# Patient Record
Sex: FEMALE | Race: WHITE | NOT HISPANIC OR LATINO | Employment: OTHER | URBAN - METROPOLITAN AREA
[De-identification: names, ages, dates, MRNs, and addresses within clinical notes are randomized per-mention and may not be internally consistent; named-entity substitution may affect disease eponyms.]

---

## 2018-05-04 ENCOUNTER — TELEPHONE (OUTPATIENT)
Dept: CARDIAC SURGERY | Facility: CLINIC | Age: 83
End: 2018-05-04

## 2018-05-04 NOTE — TELEPHONE ENCOUNTER
PT WAS MADE AN APPT ON 6/2618 IN [place]. I CALLED PT TO GIVE HER APPT DATE AND TIME. PT STATES THAT SHE CAN NOT WAIT THAT LONG. PT STATES SHE IS HAVING VERY BAD PAIN BELOW THE KNEE AND CAN'T WAIT UNTIL 6/26.

## 2018-05-17 ENCOUNTER — TRANSCRIBE ORDERS (OUTPATIENT)
Dept: CARDIAC SURGERY | Facility: CLINIC | Age: 83
End: 2018-05-17

## 2018-05-17 ENCOUNTER — OFFICE VISIT (OUTPATIENT)
Dept: CARDIAC SURGERY | Facility: CLINIC | Age: 83
End: 2018-05-17

## 2018-05-17 VITALS
HEIGHT: 63 IN | SYSTOLIC BLOOD PRESSURE: 158 MMHG | DIASTOLIC BLOOD PRESSURE: 87 MMHG | HEART RATE: 96 BPM | TEMPERATURE: 97.9 F | WEIGHT: 151 LBS | OXYGEN SATURATION: 96 % | BODY MASS INDEX: 26.75 KG/M2

## 2018-05-17 DIAGNOSIS — I73.9 PERIPHERAL VASCULAR DISEASE (HCC): Primary | ICD-10-CM

## 2018-05-17 DIAGNOSIS — I73.9 PAD (PERIPHERAL ARTERY DISEASE) (HCC): Primary | ICD-10-CM

## 2018-05-17 PROCEDURE — 99203 OFFICE O/P NEW LOW 30 MIN: CPT | Performed by: THORACIC SURGERY (CARDIOTHORACIC VASCULAR SURGERY)

## 2018-05-17 RX ORDER — ATORVASTATIN CALCIUM 20 MG/1
20 TABLET, FILM COATED ORAL DAILY
Refills: 3 | COMMUNITY
Start: 2018-02-16

## 2018-05-17 RX ORDER — EZETIMIBE 10 MG/1
10 TABLET ORAL DAILY
Refills: 2 | COMMUNITY
Start: 2018-03-03

## 2018-05-17 RX ORDER — WARFARIN SODIUM 5 MG/1
5 TABLET ORAL
COMMUNITY

## 2018-05-17 RX ORDER — LANOLIN ALCOHOL/MO/W.PET/CERES
2000 CREAM (GRAM) TOPICAL DAILY
COMMUNITY

## 2018-05-17 RX ORDER — VITAMIN E 268 MG
400 CAPSULE ORAL DAILY
COMMUNITY

## 2018-05-17 RX ORDER — FAMOTIDINE 20 MG
2000 TABLET ORAL DAILY
COMMUNITY

## 2018-05-17 RX ORDER — LEVOTHYROXINE SODIUM 0.07 MG/1
75 TABLET ORAL DAILY
Refills: 3 | COMMUNITY
Start: 2018-04-20

## 2018-05-17 RX ORDER — NIFEDIPINE 60 MG/1
60 TABLET, FILM COATED, EXTENDED RELEASE ORAL DAILY
Refills: 1 | COMMUNITY
Start: 2018-05-04 | End: 2019-06-28

## 2018-05-17 NOTE — PROGRESS NOTES
05/17/2018  Patient Information  Patricia Mccracken [facility] [address] [place] [address]   1935  'PCP/Referring Physician'  Emmett Martines MD  [phone]  Natanael Grier, JOSEPH  [phone]  Chief Complaint   Patient presents with   • Consult     Complains of numbness and tightness in both legs, right worse than left. Legs burn and hurt when she walks.   • Peripheral Vascular Disease     Cc: My  Feet burn when I walk    History of Present Illness: 83-year-old  female with a positive past medical history of hypertension hyperlipidemia and a family history of heart failure and hypertension states that when she walks she gets pain in her feet.  She also will wake up occasionally at night with a burning sensation in her feet.  Her thighs and her calves get tired when she walks but she states that she can walk 50 yards or more before she stops and that the discomfort goes away fairly quickly.  She does not have rest pain.  She has not had motor or sensory loss of her lower extremities although she states that sometimes her feet feel numb to her.  She does not have diabetes she is a lifelong non-smoker.  She has never had ulcerations or nonhealing lesions of the lower extremities or feet.  She had ABIs done in [place] that were approximately 0.5 bilaterally CT angiogram of the aorta with runoff has been reviewed.  This patient has extensive vascular disease with occlusion of both superficial femoral arteries.  The popliteal arteries are extensively diseased and actually all of the trifurcation vessels are occluded but reconstituted via a plethora of collaterals.  The patient has a history of lumbar disc disease she also has a history of deep venous thrombosis.      There is no problem list on file for this patient.    Past Medical History:   Diagnosis Date   • Deep vein thrombosis     right leg   • Degenerative disc  disease, lumbar    • Dyslipidemia    • Hypertension    • Hypothyroidism    • Inguinal hernia    • Macular degeneration    • Osteoporosis    • Peripheral vascular disease    • Pseudoxanthoma elasticum    • TIA (transient ischemic attack)      Past Surgical History:   Procedure Laterality Date   • HYSTERECTOMY     • INGUINAL HERNIA REPAIR Left    • INNER EAR SURGERY Right    • TONSILLECTOMY         Current Outpatient Prescriptions:   •  atorvastatin (LIPITOR) 20 MG tablet, Take 20 mg by mouth Daily., Disp: , Rfl: 3  •  calcium carbonate-vitamin d (CALTRATE 600+D) 600-400 MG-UNIT per tablet, Take 1 tablet by mouth Daily., Disp: , Rfl:   •  ezetimibe (ZETIA) 10 MG tablet, Take 10 mg by mouth Daily., Disp: , Rfl: 2  •  levothyroxine (SYNTHROID, LEVOTHROID) 75 MCG tablet, Take 75 mcg by mouth Daily., Disp: , Rfl: 3  •  Multiple Vitamin (MULTI-VITAMIN DAILY PO), Take  by mouth Daily., Disp: , Rfl:   •  Multiple Vitamins-Minerals (OCUVITE ADULT FORMULA PO), Take  by mouth Daily., Disp: , Rfl:   •  NIFEdipine CC (ADALAT CC) 60 MG 24 hr tablet, Take 60 mg by mouth Daily., Disp: , Rfl: 1  •  vitamin B-12 (CYANOCOBALAMIN) 1000 MCG tablet, Take 1,000 mcg by mouth Daily., Disp: , Rfl:   •  Vitamin D, Cholecalciferol, 1000 units capsule, Take 1,000 mg by mouth 2 (Two) Times a Day., Disp: , Rfl:   •  vitamin E 400 UNIT capsule, Take 400 Units by mouth Daily., Disp: , Rfl:   •  warfarin (COUMADIN) 5 MG tablet, Take 5 mg by mouth Daily., Disp: , Rfl:   No Known Allergies  Social History     Social History   • Marital status:      Spouse name: N/A   • Number of children: 4   • Years of education: N/A     Occupational History   • retired/factory woker/school cafeteria      Social History Main Topics   • Smoking status: Never Smoker   • Smokeless tobacco: Never Used   • Alcohol use No   • Drug use: No   • Sexual activity: Not on file     Other Topics Concern   • Not on file     Social History Narrative    Lives alone in [place]     Family History   Problem Relation Age of Onset   • Hypertension Mother    • Sick sinus syndrome Mother    • Heart failure Mother    • Atrial fibrillation Father    • Valvular heart disease Brother      Review of Systems   Constitution: Positive for weakness and malaise/fatigue. Negative for chills, fever, night sweats and weight loss.   HENT: Positive for hearing loss. Negative for odynophagia and sore throat.    Eyes: Positive for visual disturbance (macular degeneration).   Cardiovascular: Positive for claudication, dyspnea on exertion and leg swelling. Negative for chest pain, orthopnea and palpitations.   Respiratory: Positive for cough. Negative for hemoptysis.    Endocrine: Negative for cold intolerance, heat intolerance, polydipsia, polyphagia and polyuria.   Hematologic/Lymphatic: Bruises/bleeds easily.   Skin: Positive for color change. Negative for itching and rash.   Musculoskeletal: Positive for arthritis, back pain, joint pain, muscle cramps and muscle weakness. Negative for joint swelling and myalgias.   Gastrointestinal: Negative.  Negative for abdominal pain, constipation, diarrhea, hematemesis, hematochezia, melena, nausea and vomiting.   Genitourinary: Positive for nocturia. Negative for dysuria, frequency and hematuria.   Neurological: Positive for dizziness and numbness. Negative for focal weakness, headaches and seizures.   Psychiatric/Behavioral: Negative.  Negative for suicidal ideas.   Allergic/Immunologic: Negative.    All other systems reviewed and are negative.    Vitals:    05/17/18 1214   BP: 158/87   BP Location: Left arm   Patient Position: Sitting   Pulse: 96   Temp: 97.9 °F (36.6 °C)   SpO2: 96%   Weight: 68.5 kg (151 lb)   Height: 160 cm (63\")      Physical Exam   Constitutional: She is oriented to person, place, and time. She appears well-developed and well-nourished. No distress.   HENT:   Head: Normocephalic.   Right Ear: External ear normal.   Left Ear: External ear " normal.   Nose: Nose normal.   Eyes: Pupils are equal, round, and reactive to light.   Neck: Normal range of motion. Neck supple. No tracheal deviation present. No thyromegaly present.   Cardiovascular: Normal rate, normal heart sounds and intact distal pulses.    No murmur heard.  The feet are pink and warm with capillary refill that is less than 3 seconds.  Pulses are not palpable but present with the Doppler.  Motor and sensory function are normal.   Pulmonary/Chest: Effort normal and breath sounds normal. No respiratory distress. She has no wheezes. She has no rales. She exhibits no tenderness.   Abdominal: Soft. Bowel sounds are normal. She exhibits no distension and no mass. There is no tenderness.   Musculoskeletal: Normal range of motion. She exhibits no edema.   Bilateral varicosities are present no calf tenderness.   Lymphadenopathy:     She has no cervical adenopathy.   Neurological: She is alert and oriented to person, place, and time. She has normal reflexes. No cranial nerve deficit.   Skin: Skin is warm and dry. No rash noted. No erythema.   Psychiatric: She has a normal mood and affect.       Labs/Imaging:  CT angiogram of the aorta with runoff done in [place] and reviewed by me.  Results as noted above and per report included in the patient's record.    Assessment:  Extensive peripheral vascular disease with no immediate ischemic danger for leg loss.    Plan:  The patient will continue to walk to tolerance.  We will see her back in 6 months.  I have advised her that vascular reconstruction is likely not possible given the diffuse and distal nature of her disease.  If her extremity becomes pregangrenous then salvage operation might be indicated.        There is no problem list on file for this patient.    Signed by:     I have reviewed, verified, and confirmed the above history and current status.  I have examined the patient and confirmed the above physical findings.    Jaylon Cárdenas  MD  [facility]  05/28/18   2:16 PM

## 2018-05-18 ENCOUNTER — OFFICE VISIT (OUTPATIENT)
Dept: CARDIOLOGY | Facility: CLINIC | Age: 83
End: 2018-05-18

## 2018-05-18 ENCOUNTER — HOSPITAL ENCOUNTER (OUTPATIENT)
Dept: CARDIOLOGY | Facility: HOSPITAL | Age: 83
Discharge: HOME | End: 2018-05-18
Admitting: PHYSICIAN ASSISTANT

## 2018-05-18 VITALS
SYSTOLIC BLOOD PRESSURE: 130 MMHG | BODY MASS INDEX: 26.58 KG/M2 | DIASTOLIC BLOOD PRESSURE: 70 MMHG | WEIGHT: 150 LBS | HEIGHT: 63 IN

## 2018-05-18 VITALS
HEART RATE: 78 BPM | WEIGHT: 150 LBS | SYSTOLIC BLOOD PRESSURE: 130 MMHG | BODY MASS INDEX: 26.58 KG/M2 | DIASTOLIC BLOOD PRESSURE: 70 MMHG | HEIGHT: 63 IN

## 2018-05-18 DIAGNOSIS — E78.5 DYSLIPIDEMIA: ICD-10-CM

## 2018-05-18 DIAGNOSIS — I10 ESSENTIAL HYPERTENSION: ICD-10-CM

## 2018-05-18 DIAGNOSIS — R01.1 MURMUR, CARDIAC: ICD-10-CM

## 2018-05-18 DIAGNOSIS — I73.9 PERIPHERAL VASCULAR DISEASE (HCC): ICD-10-CM

## 2018-05-18 DIAGNOSIS — I73.9 CLAUDICATION, CLASS IV (HCC): Primary | ICD-10-CM

## 2018-05-18 PROBLEM — E03.9 HYPOTHYROIDISM: Status: ACTIVE | Noted: 2018-05-18

## 2018-05-18 PROBLEM — H35.30 MACULAR DEGENERATION: Status: ACTIVE | Noted: 2018-05-18

## 2018-05-18 PROBLEM — Q82.8 PSEUDOXANTHOMA ELASTICUM: Status: ACTIVE | Noted: 2018-05-18

## 2018-05-18 PROBLEM — G45.9 TIA (TRANSIENT ISCHEMIC ATTACK): Status: ACTIVE | Noted: 2018-05-18

## 2018-05-18 PROBLEM — I35.0 NONRHEUMATIC AORTIC VALVE STENOSIS: Status: ACTIVE | Noted: 2018-05-18

## 2018-05-18 PROBLEM — I82.401 DEEP VEIN THROMBOSIS (DVT) OF RIGHT LOWER EXTREMITY (HCC): Status: ACTIVE | Noted: 2018-05-18

## 2018-05-18 PROBLEM — M81.0 OSTEOPOROSIS: Status: ACTIVE | Noted: 2018-05-18

## 2018-05-18 PROBLEM — I82.409 DEEP VEIN THROMBOSIS: Status: ACTIVE | Noted: 2018-05-18

## 2018-05-18 PROCEDURE — 99204 OFFICE O/P NEW MOD 45 MIN: CPT | Performed by: INTERNAL MEDICINE

## 2018-05-18 PROCEDURE — 93000 ELECTROCARDIOGRAM COMPLETE: CPT | Performed by: INTERNAL MEDICINE

## 2018-05-18 PROCEDURE — 93306 TTE W/DOPPLER COMPLETE: CPT

## 2018-05-18 PROCEDURE — 93306 TTE W/DOPPLER COMPLETE: CPT | Performed by: INTERNAL MEDICINE

## 2018-05-18 RX ORDER — CILOSTAZOL 50 MG/1
50 TABLET ORAL
Qty: 60 TABLET | Refills: 11 | Status: SHIPPED | OUTPATIENT
Start: 2018-05-18 | End: 2019-06-28 | Stop reason: SDUPTHER

## 2018-05-18 NOTE — PROGRESS NOTES
Subjective   Patricia Redmond is a 83 y.o. female.  MD Jaylon Bruner MD  [address] [place] [address]      Chief Complaint   Patient presents with   • Leg Pain   • Edema       Patient Active Problem List    Diagnosis   • Peripheral vascular disease [I73.9]     Priority: High     Overview Note:     1. CTA lower extremities with contrast, April 24, 2018  Right lower extremity  · Right superficial femoral artery is occluded  · Popliteal is reconstituted by collaterals and then occluded distally.  ·   Peroneal trunk is occluded.  · Anterior and posterior tibial are occluded and then reconstituted by collaterals.    Left lower extremity;  · Left superficial femoral artery is occluded  · Popliteal is occluded.  ·  common peroneal trunk and anterior tibial artery reconstituted by collaterals.       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  She has a long history of claudication and had unspecified surgery in [place] 30 years ago which helped until approximately 5-6 years ago when she began to have reoccurrence of claudication.  Her claudication symptoms have progressively worsened.  She had a recent VENUS study which was abnormal and followed by a CTA of the lower extremities showing extensive disease.  She was referred to CT surgery for possible vascular intervention.  On exam there she was found to have a cardiac murmur consistent with aortic  stenosis.  She has no current complaint of exertional chest pain or dyspnea, no orthopnea, no PND.  She's had a recent myocardial perfusion study which was within normal limits.  She does report occasional dizziness which is not associated with syncope or tachycardia palpitations.  Her lower extremity pain has progressed to pain on ambulation of approximately 50 feet.  Her pain previously ceased immediately with rest but now takes over a minute to resolve.  She has occasional lower extremity edema which is self-limiting.  She does not check her blood pressure regularly at home.  She is on 2 cholesterol medications and had a recent lipid panel showing high HDL of 69 mg per DL and LDL 72 mg per DL.  On further questioning she was taken to an ER approximately 4 years ago at which time she was told she has had a murmur which was not evaluated.  She has a remote history of TIA.  She is on warfarin for history of right lower extremity DVT ×2.              Past Surgical History:   Procedure Laterality Date   • HYSTERECTOMY     • INGUINAL HERNIA REPAIR Left    • INNER EAR SURGERY Right    • SYMPATHECTOMY     • TONSILLECTOMY         The following portions of the patient's history were reviewed and updated as appropriate: allergies, current medications, past family history, past medical history, past social history, past surgical history and problem list.    No Known Allergies      Current Outpatient Prescriptions:   •  atorvastatin (LIPITOR) 20 MG tablet, Take 20 mg by mouth Daily., Disp: , Rfl: 3  •  calcium carbonate-vitamin d (CALTRATE 600+D) 600-400 MG-UNIT per tablet, Take 1 tablet by mouth Daily., Disp: , Rfl:   •  ezetimibe (ZETIA) 10 MG tablet, Take 10 mg by mouth Daily., Disp: , Rfl: 2  •  levothyroxine (SYNTHROID, LEVOTHROID) 75 MCG tablet, Take 75 mcg by mouth Daily., Disp: , Rfl: 3  •  Multiple Vitamin (MULTI-VITAMIN DAILY PO), Take  by mouth Daily., Disp: , Rfl:   •  Multiple Vitamins-Minerals (OCUVITE ADULT  "FORMULA PO), Take  by mouth Daily., Disp: , Rfl:   •  NIFEdipine CC (ADALAT CC) 60 MG 24 hr tablet, Take 60 mg by mouth Daily., Disp: , Rfl: 1  •  vitamin B-12 (CYANOCOBALAMIN) 1000 MCG tablet, Take 1,000 mcg by mouth Daily., Disp: , Rfl:   •  Vitamin D, Cholecalciferol, 1000 units capsule, Take 1,000 mg by mouth 2 (Two) Times a Day., Disp: , Rfl:   •  vitamin E 400 UNIT capsule, Take 400 Units by mouth Daily., Disp: , Rfl:   •  warfarin (COUMADIN) 5 MG tablet, Take 5 mg by mouth Daily., Disp: , Rfl:     Review of Systems   Constitution: Negative.   HENT: Negative.    Eyes: Negative.    Cardiovascular: Positive for claudication and leg swelling. Negative for chest pain, dyspnea on exertion, irregular heartbeat, orthopnea, palpitations, paroxysmal nocturnal dyspnea and syncope.   Respiratory: Negative.    Endocrine: Negative.    Hematologic/Lymphatic: Negative.    Skin: Negative.    Musculoskeletal: Negative.    Gastrointestinal: Negative.    Genitourinary: Negative.    Neurological: Positive for dizziness.   Psychiatric/Behavioral: Negative.    Allergic/Immunologic: Negative.    All other systems reviewed and are negative.      Social History     Social History   • Marital status:      Spouse name: N/A   • Number of children: 4   • Years of education: N/A     Occupational History   • retired/factory woker/school cafeteria      Social History Main Topics   • Smoking status: Never Smoker   • Smokeless tobacco: Never Used   • Alcohol use No   • Drug use: No   • Sexual activity: Defer     Other Topics Concern   • Not on file     Social History Narrative    Lives alone in [facility]       Family History   Problem Relation Age of Onset   • Hypertension Mother    • Sick sinus syndrome Mother    • Heart failure Mother    • Atrial fibrillation Father    • Valvular heart disease Brother        Objective      Blood pressure 130/70, pulse 78, height 160 cm (63\"), weight 68 kg (150 lb).    Physical Exam   Constitutional: " She is oriented to person, place, and time. She appears well-developed and well-nourished. No distress.   HENT:   Head: Normocephalic and atraumatic.   Mouth/Throat: Oropharynx is clear and moist.   Eyes: Pupils are equal, round, and reactive to light. No scleral icterus.   Neck: Neck supple. No JVD present. No tracheal deviation present. No thyromegaly present.   Cardiovascular: Normal rate and regular rhythm.  Exam reveals no gallop and no friction rub.    Murmur heard.   Harsh midsystolic murmur is present with a grade of 2/6  at the upper right sternal border radiating to the neck  Pulses:       Femoral pulses are 2+ on the right side, and 2+ on the left side.       Dorsalis pedis pulses are 1+ on the right side, and 1+ on the left side.   Pulmonary/Chest: Effort normal and breath sounds normal. No respiratory distress. She has no wheezes. She has no rales.   Abdominal: Soft. Bowel sounds are normal. She exhibits no distension and no mass. There is no tenderness. There is no rebound and no guarding.   Musculoskeletal: Normal range of motion. She exhibits no edema or deformity.   Lymphadenopathy:     She has no cervical adenopathy.   Neurological: She is alert and oriented to person, place, and time. No cranial nerve deficit.   Skin: Skin is warm and dry. No rash noted. She is not diaphoretic.   Psychiatric: She has a normal mood and affect.   Nursing note and vitals reviewed.        ECG 12 Lead  Date/Time: 5/18/2018 3:51 PM  Performed by: YONG GTZ  Authorized by: YONG GTZ   Previous ECG: no previous ECG available  Rhythm: sinus rhythm  Ectopy: atrial premature contractions  Rate: normal  Conduction: conduction normal  ST Segments: ST segments normal  T Waves: T waves normal  QRS axis: normal  Other: no other findings  Clinical impression: normal ECG  Comments: Normal exception of single PAC  Rate 78            Lab Review:   Assessment:   Diagnosis Plan   1. Claudication, class III to IV  Pletal 50 mg bid    2. Peripheral vascular disease With bilateral SFA occlusions.      3. Murmur, cardiac  Adult Transthoracic Echo Complete W/ Cont if Necessary Per Protocol   4. Essential hypertension     5.  6. Dyslipidemia   History of DVT with chronic anti-correlation therapy.          Plan:  1. Medical therapy for stable PAD, continue current medications, start plant based diet and add Pletal 50 minute grams twice a day.  2. I explained that if symptoms progress or worsen and she has significant limitation of lifestyle we can consider angiogram and catheter-based intervention with questionable success as I do not feel that bypasses in her best interest due to no evidence of limb threatening ischemia.  3. Echocardiogram for further assessment of suspected aortic stenosis.  4. Continue all other  current medications.   5. F/U in 3 months in [facility], sooner as needed.  6. Thank you for allowing us to participate in the care of your patient.     Scribed for Carolin Green MD by Ramon Jones PA-C. 5/18/2018  3:41 PM

## 2018-05-21 LAB
BH CV ECHO MEAS - AO MAX PG (FULL): 29.1 MMHG
BH CV ECHO MEAS - AO MAX PG: 39 MMHG
BH CV ECHO MEAS - AO MEAN PG (FULL): 16.9 MMHG
BH CV ECHO MEAS - AO MEAN PG: 21 MMHG
BH CV ECHO MEAS - AO ROOT AREA (BSA CORRECTED): 1.5
BH CV ECHO MEAS - AO ROOT AREA: 5.4 CM^2
BH CV ECHO MEAS - AO ROOT DIAM: 2.6 CM
BH CV ECHO MEAS - AO V2 MAX: 313 CM/SEC
BH CV ECHO MEAS - AO V2 MEAN: 209.8 CM/SEC
BH CV ECHO MEAS - AO V2 VTI: 73.6 CM
BH CV ECHO MEAS - AVA(I,A): 1.1 CM^2
BH CV ECHO MEAS - AVA(I,D): 1.1 CM^2
BH CV ECHO MEAS - AVA(V,A): 1.2 CM^2
BH CV ECHO MEAS - AVA(V,D): 1.2 CM^2
BH CV ECHO MEAS - BSA(HAYCOCK): 1.8 M^2
BH CV ECHO MEAS - BSA: 1.7 M^2
BH CV ECHO MEAS - BZI_BMI: 26.6 KILOGRAMS/M^2
BH CV ECHO MEAS - BZI_METRIC_HEIGHT: 160 CM
BH CV ECHO MEAS - BZI_METRIC_WEIGHT: 68 KG
BH CV ECHO MEAS - EDV(CUBED): 46.9 ML
BH CV ECHO MEAS - EDV(TEICH): 54.7 ML
BH CV ECHO MEAS - EF(CUBED): 46.2 %
BH CV ECHO MEAS - EF(TEICH): 39.4 %
BH CV ECHO MEAS - ESV(CUBED): 25.2 ML
BH CV ECHO MEAS - ESV(TEICH): 33.1 ML
BH CV ECHO MEAS - FS: 18.7 %
BH CV ECHO MEAS - IVS/LVPW: 1.1
BH CV ECHO MEAS - IVSD: 1.2 CM
BH CV ECHO MEAS - LA DIMENSION: 3.3 CM
BH CV ECHO MEAS - LA/AO: 1.3
BH CV ECHO MEAS - LAT PEAK E' VEL: 6 CM/SEC
BH CV ECHO MEAS - LV MASS(C)D: 129.7 GRAMS
BH CV ECHO MEAS - LV MASS(C)DI: 75.8 GRAMS/M^2
BH CV ECHO MEAS - LV MAX PG: 4.9 MMHG
BH CV ECHO MEAS - LV MEAN PG: 3.1 MMHG
BH CV ECHO MEAS - LV V1 MAX: 110.8 CM/SEC
BH CV ECHO MEAS - LV V1 MEAN: 82.3 CM/SEC
BH CV ECHO MEAS - LV V1 VTI: 27 CM
BH CV ECHO MEAS - LVIDD: 3.6 CM
BH CV ECHO MEAS - LVIDS: 2.9 CM
BH CV ECHO MEAS - LVOT AREA (M): 3.1 CM^2
BH CV ECHO MEAS - LVOT AREA: 3 CM^2
BH CV ECHO MEAS - LVOT DIAM: 2 CM
BH CV ECHO MEAS - LVPWD: 1.1 CM
BH CV ECHO MEAS - MED PEAK E' VEL: 5.21 CM/SEC
BH CV ECHO MEAS - MV A MAX VEL: 103.6 CM/SEC
BH CV ECHO MEAS - MV E MAX VEL: 87.7 CM/SEC
BH CV ECHO MEAS - MV E/A: 0.85
BH CV ECHO MEAS - PA ACC SLOPE: 590.2 CM/SEC^2
BH CV ECHO MEAS - PA ACC TIME: 0.13 SEC
BH CV ECHO MEAS - PA MAX PG: 7.8 MMHG
BH CV ECHO MEAS - PA PR(ACCEL): 18.8 MMHG
BH CV ECHO MEAS - PA V2 MAX: 139.8 CM/SEC
BH CV ECHO MEAS - RAP SYSTOLE: 3 MMHG
BH CV ECHO MEAS - RVSP: 25 MMHG
BH CV ECHO MEAS - SI(AO): 230.6 ML/M^2
BH CV ECHO MEAS - SI(CUBED): 12.7 ML/M^2
BH CV ECHO MEAS - SI(LVOT): 47.7 ML/M^2
BH CV ECHO MEAS - SI(TEICH): 12.6 ML/M^2
BH CV ECHO MEAS - SV(AO): 394.6 ML
BH CV ECHO MEAS - SV(CUBED): 21.7 ML
BH CV ECHO MEAS - SV(LVOT): 81.6 ML
BH CV ECHO MEAS - SV(TEICH): 21.6 ML
BH CV ECHO MEAS - TAPSE (>1.6): 1.6 CM2
BH CV ECHO MEAS - TR MAX VEL: 230.3 CM/SEC
BH CV ECHO MEASUREMENTS AVERAGE E/E' RATIO: 15.65
BH CV VAS BP RIGHT ARM: NORMAL MMHG
BH CV XLRA - RV BASE: 3 CM
BH CV XLRA - RV LENGTH: 5.9 CM
BH CV XLRA - RV MID: 2.6 CM
BH CV XLRA - TDI S': 10.8 CM/SEC
LEFT ATRIUM VOLUME INDEX: 36.8 ML/M2
MAXIMAL PREDICTED HEART RATE: 138 BPM
STRESS TARGET HR: 117 BPM

## 2018-09-14 ENCOUNTER — OFFICE VISIT (OUTPATIENT)
Dept: CARDIOLOGY | Facility: CLINIC | Age: 83
End: 2018-09-14

## 2018-09-14 VITALS
HEART RATE: 87 BPM | WEIGHT: 151 LBS | OXYGEN SATURATION: 96 % | DIASTOLIC BLOOD PRESSURE: 68 MMHG | HEIGHT: 63 IN | BODY MASS INDEX: 26.75 KG/M2 | SYSTOLIC BLOOD PRESSURE: 126 MMHG

## 2018-09-14 DIAGNOSIS — I10 ESSENTIAL HYPERTENSION: ICD-10-CM

## 2018-09-14 DIAGNOSIS — I73.9 PERIPHERAL VASCULAR DISEASE (HCC): Primary | ICD-10-CM

## 2018-09-14 DIAGNOSIS — I35.0 NONRHEUMATIC AORTIC VALVE STENOSIS: ICD-10-CM

## 2018-09-14 DIAGNOSIS — I73.9 CLAUDICATION, CLASS IV (HCC): ICD-10-CM

## 2018-09-14 DIAGNOSIS — R01.1 MURMUR, CARDIAC: ICD-10-CM

## 2018-09-14 DIAGNOSIS — E78.5 DYSLIPIDEMIA: ICD-10-CM

## 2018-09-14 PROCEDURE — 99213 OFFICE O/P EST LOW 20 MIN: CPT | Performed by: INTERNAL MEDICINE

## 2018-09-14 NOTE — PROGRESS NOTES
Encounter Date:09/14/2018      Patient ID: Patricia Redmond is a 83 y.o. female.    Chief Complaint: Claudication      PROBLEM LIST:  1. Peripheral Vascular Disease  a. CTA lower extremities with contrast, April 24, 2018: Right lower extremity. Right superficial femoral artery is occluded. Popliteal is reconstituted by collaterals and then occluded distally. Peroneal trunk is occluded. Anterior and posterior tibial are occluded and then reconstituted by collaterals. Left lower extremity; Left superficial femoral artery is occluded. Popliteal is occluded. Common peroneal trunk and anterior tibial artery reconstituted by collaterals  2. Claudication  3. Murmurs  a. Echo 5/21/2018: Left atrial cavity size is mildly dilated. Left ventricular systolic function is normal. Estimated EF 56-60%. Left ventricular diastolic dysfunction (grade I) consistent with impaired relaxation. Moderate aortic valve stenosis is present. Mean gradient 21 mmHg. Mild mitral valve regurgitation is present. Mild tricuspid valve regurgitation is present. RVSP within normal limits.  4. Hypertension  5. Dyslipidemia  6. DVT  7. TIA  8. Macular degeneration  9. Pseudoxanthoma elasticum  10. Hypothyroidism  11. Osteoporosis    History of Present Illness  Patient presents today for follow-up with a history of peripheral vascular disease, VHD, and cardiac risk factors. Since last visit has continued to experience lower extremity pain, but states Pletal has helped her significantly. She reports that her leg pain occurs with walking and is resolved with rest, she has however started experiencing nocturnal pain now.She has not strictly followed her plant based diet. Denies smoking cigarettes and drinking alcohol.  Denies chest pain, shortness of breath, leg swelling, palpitations, and syncope. Remains busy and active limited by leg pain.    No Known Allergies      Current Outpatient Prescriptions:   •  atorvastatin (LIPITOR) 20 MG tablet, Take 20 mg  "by mouth Daily., Disp: , Rfl: 3  •  calcium carbonate-vitamin d (CALTRATE 600+D) 600-400 MG-UNIT per tablet, Take 1 tablet by mouth Daily., Disp: , Rfl:   •  cilostazol (PLETAL) 50 MG tablet, Take 1 tablet by mouth 2 (Two) Times a Day Before Meals., Disp: 60 tablet, Rfl: 11  •  ezetimibe (ZETIA) 10 MG tablet, Take 10 mg by mouth Daily., Disp: , Rfl: 2  •  levothyroxine (SYNTHROID, LEVOTHROID) 75 MCG tablet, Take 75 mcg by mouth Daily., Disp: , Rfl: 3  •  Multiple Vitamin (MULTI-VITAMIN DAILY PO), Take  by mouth Daily., Disp: , Rfl:   •  Multiple Vitamins-Minerals (OCUVITE ADULT FORMULA PO), Take  by mouth Daily., Disp: , Rfl:   •  NIFEdipine CC (ADALAT CC) 60 MG 24 hr tablet, Take 60 mg by mouth Daily., Disp: , Rfl: 1  •  vitamin B-12 (CYANOCOBALAMIN) 1000 MCG tablet, Take 1,000 mcg by mouth Daily., Disp: , Rfl:   •  Vitamin D, Cholecalciferol, 1000 units capsule, Take 1,000 mg by mouth 2 (Two) Times a Day., Disp: , Rfl:   •  vitamin E 400 UNIT capsule, Take 400 Units by mouth Daily., Disp: , Rfl:   •  warfarin (COUMADIN) 5 MG tablet, Take 5 mg by mouth Daily., Disp: , Rfl:     The following portions of the patient's history were reviewed and updated as appropriate: allergies, current medications, past family history, past medical history, past social history, past surgical history and problem list.    ROS  Review of Systems   Constitution: Negative for chills, fever, weight gain and weight loss.   Cardiovascular: Negative for chest pain, claudication, dyspnea on exertion, orthopnea, palpitations, paroxysmal nocturnal dyspnea and syncope. 1+ edema in her lower extremity       No dizziness   Gastrointestinal: Negative for abdominal pain, constipation, diarrhea, nausea and vomiting.   Genitourinary:        No urinary symptoms   Neurological:        No symptoms of stroke.   All other systems reviewed and are negative.    Objective:     Blood pressure 126/68, pulse 87, height 160 cm (63\"), weight 68.5 kg (151 lb), SpO2 " 96 %.        Physical Exam  Constitutional: She appears well-developed and well-nourished.   HENT:   HEENT exam unremarkable.   Neck: Neck supple. No JVD present.   No carotid bruits.   Cardiovascular: Normal rate, regular rhythm and normal heart sounds.    2/6 systolic murmur heard.  2 plus symmetric pulses.   Pulmonary/Chest: Breath sounds normal. Does not exhibit tenderness.   Abdominal:   Abdomen benign.   Musculoskeletal: Does not exhibit edema.   Neurological:   Neurological exam unremarkable.   Vitals reviewed.    Lab Review:   Procedures       Assessment:      Diagnosis Plan   1. Peripheral vascular disease (CMS/HCC)     2. Claudication, class IV (CMS/HCC)  Continue Pletal   3. Aortic stenosis, moderate/asymptomatic.      4. Essential hypertension  Well controlled with current medication regimen.   5. Dyslipidemia  Continue Lipitor 20 mg daily.      Plan:   Begin regular exercise and try to walk as much as possible..   Continue current medications.   Once again discussed that medical management is the best option, her leg pain specially right leg pain is also partly related to her chronic venoocclusive disease and history of DVT and there are not many interventional options.  We will continue Pletal and warfarin.  Regarding moderate aortic stenosis we will monitor clinically and consider repeating an echo in 1 year.  FU in 6 MO at [facility], sooner as needed.  Thank you for allowing us to participate in the care of your patient.     Scribed for Carolin Green MD by Salvador Tello. 9/14/2018  2:13 PM    ICarolin MD, personally performed the services described in this documentation as scribed by the above named individual in my presence, and it is both accurate and complete.  9/14/2018  2:27 PM        Please note that portions of this note may have been completed with a voice recognition program. Efforts were made to edit the dictations, but occasionally words are mistranscribed.

## 2018-10-12 ENCOUNTER — TELEPHONE (OUTPATIENT)
Dept: CARDIAC SURGERY | Facility: CLINIC | Age: 83
End: 2018-10-12

## 2018-10-12 NOTE — TELEPHONE ENCOUNTER
PT CALLING TO SCHEDULE 6 MONTH F/U. PLEASE CALL DAUGHTER (NAM) [phone]. PT WILL BE OUT OF TOWN NEXT WEEK.

## 2018-11-15 ENCOUNTER — OFFICE VISIT (OUTPATIENT)
Dept: CARDIAC SURGERY | Facility: CLINIC | Age: 83
End: 2018-11-15

## 2018-11-15 VITALS
SYSTOLIC BLOOD PRESSURE: 120 MMHG | BODY MASS INDEX: 26.58 KG/M2 | TEMPERATURE: 97.4 F | OXYGEN SATURATION: 98 % | HEART RATE: 92 BPM | WEIGHT: 150 LBS | DIASTOLIC BLOOD PRESSURE: 60 MMHG | HEIGHT: 63 IN

## 2018-11-15 DIAGNOSIS — I73.9 CLAUDICATION, CLASS IV (HCC): ICD-10-CM

## 2018-11-15 DIAGNOSIS — I35.0 NONRHEUMATIC AORTIC VALVE STENOSIS: ICD-10-CM

## 2018-11-15 DIAGNOSIS — I73.9 PERIPHERAL VASCULAR DISEASE (HCC): Primary | ICD-10-CM

## 2018-11-15 PROCEDURE — 99213 OFFICE O/P EST LOW 20 MIN: CPT | Performed by: THORACIC SURGERY (CARDIOTHORACIC VASCULAR SURGERY)

## 2018-11-15 NOTE — PROGRESS NOTES
11/15/2018  Patient Information  Patricia Redmond                                                                                          [address] [place] [address]   1935  'PCP/Referring Physician'  Hilario Griermariel, APRN  [phone]  No ref. provider found    Chief Complaint   Patient presents with   • Follow-up     6 MO FU for PVD     CC:my legs are better     History of Present Illness: 83-year-old  female being seen in follow-up for peripheral vascular disease.  This patient has fairly typical buttock, thigh, and calf claudication at about 50-75 cc her right leg becomes symptomatic before her left leg she has some pain in the anterior portion of the right ankle.  She does not have typical rest pain.  She has not had motor or sensory loss in either foot.  She has been walking to tolerance and doing well.  She increases her walking distance regularly and us far has tolerated the increase.  She does not smoke.  She has hypertension and dyslipidemia.  She does not have diabetes. She is very aware normal foot care and hygiene.  She has had 1 recent episode of what seems to be orthostatic hypotension.  If the symptoms recur she will see her family physician.      Patient Active Problem List   Diagnosis   • Hypertension   • Peripheral vascular disease (CMS/HCC)   • Dyslipidemia   • Macular degeneration   • Pseudoxanthoma elasticum   • Hypothyroidism   • Osteoporosis   • Deep vein thrombosis (DVT) of right lower extremity (CMS/HCC)   • Claudication, class IV (CMS/HCC)   • TIA (transient ischemic attack)   • Murmur, cardiac   • Nonrheumatic aortic valve stenosis     Past Medical History:   Diagnosis Date   • Degenerative disc disease, lumbar    • Dyslipidemia    • Hypertension    • Hypothyroidism    • Inguinal hernia    • Macular degeneration    • Osteoporosis    • Peripheral vascular disease (CMS/HCC)    • Pseudoxanthoma elasticum    • TIA (transient ischemic attack)      Past Surgical History:    Procedure Laterality Date   • HYSTERECTOMY     • INGUINAL HERNIA REPAIR Left    • INNER EAR SURGERY Right    • SYMPATHECTOMY     • TONSILLECTOMY         Current Outpatient Medications:   •  atorvastatin (LIPITOR) 20 MG tablet, Take 20 mg by mouth Daily., Disp: , Rfl: 3  •  calcium carbonate-vitamin d (CALTRATE 600+D) 600-400 MG-UNIT per tablet, Take 1 tablet by mouth Daily., Disp: , Rfl:   •  cilostazol (PLETAL) 50 MG tablet, Take 1 tablet by mouth 2 (Two) Times a Day Before Meals., Disp: 60 tablet, Rfl: 11  •  ezetimibe (ZETIA) 10 MG tablet, Take 10 mg by mouth Daily., Disp: , Rfl: 2  •  levothyroxine (SYNTHROID, LEVOTHROID) 75 MCG tablet, Take 75 mcg by mouth Daily., Disp: , Rfl: 3  •  Multiple Vitamin (MULTI-VITAMIN DAILY PO), Take  by mouth Daily., Disp: , Rfl:   •  Multiple Vitamins-Minerals (OCUVITE ADULT FORMULA PO), Take  by mouth Daily., Disp: , Rfl:   •  NIFEdipine CC (ADALAT CC) 60 MG 24 hr tablet, Take 60 mg by mouth Daily., Disp: , Rfl: 1  •  vitamin B-12 (CYANOCOBALAMIN) 1000 MCG tablet, Take 1,000 mcg by mouth Daily., Disp: , Rfl:   •  Vitamin D, Cholecalciferol, 1000 units capsule, Take 1,000 mg by mouth 2 (Two) Times a Day., Disp: , Rfl:   •  vitamin E 400 UNIT capsule, Take 400 Units by mouth Daily., Disp: , Rfl:   •  warfarin (COUMADIN) 5 MG tablet, Take 5 mg by mouth Daily., Disp: , Rfl:   No Known Allergies  Social History     Socioeconomic History   • Marital status:      Spouse name: Not on file   • Number of children: 4   • Years of education: Not on file   • Highest education level: Not on file   Social Needs   • Financial resource strain: Not on file   • Food insecurity - worry: Not on file   • Food insecurity - inability: Not on file   • Transportation needs - medical: Not on file   • Transportation needs - non-medical: Not on file   Occupational History   • Occupation: retired/factory woker/school cafeteria   Tobacco Use   • Smoking status: Never Smoker   • Smokeless tobacco:  "Never Used   Substance and Sexual Activity   • Alcohol use: No   • Drug use: No   • Sexual activity: Defer   Other Topics Concern   • Not on file   Social History Narrative    Lives alone in [facility]     Family History   Problem Relation Age of Onset   • Hypertension Mother    • Sick sinus syndrome Mother    • Heart failure Mother    • Atrial fibrillation Father    • Valvular heart disease Brother      Review of Systems   Constitution: Negative for chills, fever, malaise/fatigue, night sweats and weight loss.   HENT: Positive for hearing loss. Negative for odynophagia and sore throat.    Cardiovascular: Positive for claudication and leg swelling. Negative for chest pain, dyspnea on exertion, orthopnea and palpitations.   Respiratory: Positive for cough. Negative for hemoptysis.    Endocrine: Negative for cold intolerance, heat intolerance, polydipsia, polyphagia and polyuria.   Hematologic/Lymphatic: Bruises/bleeds easily.   Skin: Negative for itching and rash.   Musculoskeletal: Positive for back pain. Negative for joint pain, joint swelling and myalgias.   Gastrointestinal: Negative for abdominal pain, constipation, diarrhea, hematemesis, hematochezia, melena, nausea and vomiting.   Genitourinary: Negative for dysuria, frequency and hematuria.   Neurological: Negative for focal weakness, headaches, numbness and seizures.   Psychiatric/Behavioral: Negative for suicidal ideas.   All other systems reviewed and are negative.    Vitals:    11/15/18 0949   BP: 120/60   BP Location: Left arm   Patient Position: Sitting   Pulse: 92   Temp: 97.4 °F (36.3 °C)   SpO2: 98%   Weight: 68 kg (150 lb)   Height: 160 cm (63\")      Physical Exam   Constitutional: She is oriented to person, place, and time. She appears well-developed and well-nourished. No distress.   HENT:   Head: Normocephalic.   Right Ear: External ear normal.   Left Ear: External ear normal.   Nose: Nose normal.   Eyes: Pupils are equal, round, and reactive to " light.   Neck: Normal range of motion. Neck supple. No tracheal deviation present. No thyromegaly present.   Cardiovascular: Normal rate, regular rhythm and intact distal pulses.   Murmur heard.  I/VI systolic murmurRSB--->neck   Pulmonary/Chest: Effort normal and breath sounds normal. No respiratory distress. She has no wheezes. She has no rales. She exhibits no tenderness.   Abdominal: Soft. Bowel sounds are normal. She exhibits no distension and no mass. There is no tenderness.   Musculoskeletal: Normal range of motion. She exhibits no edema.   Feet are pink and warm with normal nails and normal skin.  Cap refill 2-3 seconds.  Pulses are not palpable but present with Doppler.   Lymphadenopathy:     She has no cervical adenopathy.   Neurological: She is alert and oriented to person, place, and time. She has normal reflexes. No cranial nerve deficit.   Skin: Skin is warm and dry. No rash noted. No erythema.   Psychiatric: She has a normal mood and affect.       Labs/Imaging:none    Assessment:PVD both lower extremities with mild ischemia    Plan:continue current treatment regimen, walk to tolerance, foot care, etc.  RTC 1 year  Sooner if symptoms worsen    Patient Active Problem List   Diagnosis   • Hypertension   • Peripheral vascular disease (CMS/HCC)   • Dyslipidemia   • Macular degeneration   • Pseudoxanthoma elasticum   • Hypothyroidism   • Osteoporosis   • Deep vein thrombosis (DVT) of right lower extremity (CMS/HCC)   • Claudication, class IV (CMS/HCC)   • TIA (transient ischemic attack)   • Murmur, cardiac   • Nonrheumatic aortic valve stenosis         Jaylon Cárdenas MD  [facility]  11/15/18   12:32 PM

## 2019-03-27 ENCOUNTER — OFFICE VISIT (OUTPATIENT)
Dept: CARDIOLOGY | Facility: CLINIC | Age: 84
End: 2019-03-27

## 2019-03-27 ENCOUNTER — HOSPITAL ENCOUNTER (OUTPATIENT)
Dept: CARDIOLOGY | Facility: HOSPITAL | Age: 84
Discharge: HOME | End: 2019-03-27
Admitting: INTERNAL MEDICINE

## 2019-03-27 VITALS
OXYGEN SATURATION: 96 % | WEIGHT: 148.8 LBS | HEART RATE: 96 BPM | DIASTOLIC BLOOD PRESSURE: 60 MMHG | HEIGHT: 63 IN | BODY MASS INDEX: 26.36 KG/M2 | SYSTOLIC BLOOD PRESSURE: 110 MMHG

## 2019-03-27 DIAGNOSIS — I73.9 PERIPHERAL VASCULAR DISEASE (HCC): Primary | ICD-10-CM

## 2019-03-27 DIAGNOSIS — I35.0 NONRHEUMATIC AORTIC VALVE STENOSIS: ICD-10-CM

## 2019-03-27 DIAGNOSIS — E78.5 DYSLIPIDEMIA: ICD-10-CM

## 2019-03-27 DIAGNOSIS — R42 DIZZINESS: ICD-10-CM

## 2019-03-27 DIAGNOSIS — I10 ESSENTIAL HYPERTENSION: ICD-10-CM

## 2019-03-27 LAB
AORTIC DIMENSIONLESS INDEX: 0.4 (DI)
BH CV ECHO MEAS - AO MAX PG (FULL): 41.3 MMHG
BH CV ECHO MEAS - AO MAX PG: 49.6 MMHG
BH CV ECHO MEAS - AO MEAN PG (FULL): 21 MMHG
BH CV ECHO MEAS - AO MEAN PG: 26 MMHG
BH CV ECHO MEAS - AO ROOT AREA (BSA CORRECTED): 1.6
BH CV ECHO MEAS - AO ROOT AREA: 5.5 CM^2
BH CV ECHO MEAS - AO ROOT DIAM: 2.7 CM
BH CV ECHO MEAS - AO V2 MAX: 352.3 CM/SEC
BH CV ECHO MEAS - AO V2 MEAN: 239 CM/SEC
BH CV ECHO MEAS - AO V2 VTI: 73.2 CM
BH CV ECHO MEAS - AVA(I,A): 1.2 CM^2
BH CV ECHO MEAS - AVA(I,D): 1.2 CM^2
BH CV ECHO MEAS - AVA(V,A): 1.2 CM^2
BH CV ECHO MEAS - AVA(V,D): 1.2 CM^2
BH CV ECHO MEAS - BSA(HAYCOCK): 1.7 M^2
BH CV ECHO MEAS - BSA: 1.7 M^2
BH CV ECHO MEAS - BZI_BMI: 26.2 KILOGRAMS/M^2
BH CV ECHO MEAS - BZI_METRIC_HEIGHT: 160 CM
BH CV ECHO MEAS - BZI_METRIC_WEIGHT: 67.1 KG
BH CV ECHO MEAS - EDV(CUBED): 58.1 ML
BH CV ECHO MEAS - EDV(TEICH): 64.8 ML
BH CV ECHO MEAS - EF(CUBED): 76.5 %
BH CV ECHO MEAS - EF(TEICH): 69.2 %
BH CV ECHO MEAS - ESV(CUBED): 13.7 ML
BH CV ECHO MEAS - ESV(TEICH): 20 ML
BH CV ECHO MEAS - FS: 38.3 %
BH CV ECHO MEAS - IVS/LVPW: 0.99
BH CV ECHO MEAS - IVSD: 0.97 CM
BH CV ECHO MEAS - LA DIMENSION: 3.2 CM
BH CV ECHO MEAS - LA/AO: 1.2
BH CV ECHO MEAS - LAD MAJOR: 6.3 CM
BH CV ECHO MEAS - LAT PEAK E' VEL: 8.7 CM/SEC
BH CV ECHO MEAS - LATERAL E/E' RATIO: 11.3
BH CV ECHO MEAS - LV MASS(C)D: 116.9 GRAMS
BH CV ECHO MEAS - LV MASS(C)DI: 68.7 GRAMS/M^2
BH CV ECHO MEAS - LV MAX PG: 8.4 MMHG
BH CV ECHO MEAS - LV MEAN PG: 5 MMHG
BH CV ECHO MEAS - LV V1 MAX: 144.6 CM/SEC
BH CV ECHO MEAS - LV V1 MEAN: 96.3 CM/SEC
BH CV ECHO MEAS - LV V1 VTI: 29.3 CM
BH CV ECHO MEAS - LVIDD: 3.9 CM
BH CV ECHO MEAS - LVIDS: 2.4 CM
BH CV ECHO MEAS - LVOT AREA (M): 3.1 CM^2
BH CV ECHO MEAS - LVOT AREA: 3 CM^2
BH CV ECHO MEAS - LVOT DIAM: 2 CM
BH CV ECHO MEAS - LVPWD: 0.98 CM
BH CV ECHO MEAS - MED PEAK E' VEL: 5.6 CM/SEC
BH CV ECHO MEAS - MEDIAL E/E' RATIO: 17.4
BH CV ECHO MEAS - MV A MAX VEL: 114 CM/SEC
BH CV ECHO MEAS - MV DEC SLOPE: 410.5 CM/SEC^2
BH CV ECHO MEAS - MV DEC TIME: 0.22 SEC
BH CV ECHO MEAS - MV E MAX VEL: 99.7 CM/SEC
BH CV ECHO MEAS - MV E/A: 0.87
BH CV ECHO MEAS - MV P1/2T MAX VEL: 112.4 CM/SEC
BH CV ECHO MEAS - MV P1/2T: 80.2 MSEC
BH CV ECHO MEAS - MVA P1/2T LCG: 2 CM^2
BH CV ECHO MEAS - MVA(P1/2T): 2.7 CM^2
BH CV ECHO MEAS - PA ACC SLOPE: 696.3 CM/SEC^2
BH CV ECHO MEAS - PA ACC TIME: 0.13 SEC
BH CV ECHO MEAS - PA PR(ACCEL): 18.4 MMHG
BH CV ECHO MEAS - RAP SYSTOLE: 3 MMHG
BH CV ECHO MEAS - RV MAX PG: 1.5 MMHG
BH CV ECHO MEAS - RV V1 MAX: 61.7 CM/SEC
BH CV ECHO MEAS - RVSP: 25 MMHG
BH CV ECHO MEAS - SI(AO): 238.5 ML/M^2
BH CV ECHO MEAS - SI(CUBED): 26.1 ML/M^2
BH CV ECHO MEAS - SI(LVOT): 52.2 ML/M^2
BH CV ECHO MEAS - SI(TEICH): 26.4 ML/M^2
BH CV ECHO MEAS - SV(AO): 405.8 ML
BH CV ECHO MEAS - SV(CUBED): 44.4 ML
BH CV ECHO MEAS - SV(LVOT): 88.9 ML
BH CV ECHO MEAS - SV(TEICH): 44.9 ML
BH CV ECHO MEAS - TAPSE (>1.6): 1.7 CM2
BH CV ECHO MEAS - TR MAX PG: 22 MMHG
BH CV ECHO MEAS - TR MAX VEL: 231.6 CM/SEC
BH CV ECHO MEASUREMENTS AVERAGE E/E' RATIO: 13.94
BH CV XLRA - RV BASE: 3.4 CM
BH CV XLRA - RV LENGTH: 5.2 CM
BH CV XLRA - RV MID: 3 CM
BH CV XLRA - TDI S': 12 CM/SEC
LEFT ATRIUM VOLUME INDEX: 32.3 ML/M^2
LEFT ATRIUM VOLUME: 55 ML
LV EF 2D ECHO EST: 65 %

## 2019-03-27 PROCEDURE — 93306 TTE W/DOPPLER COMPLETE: CPT | Performed by: INTERNAL MEDICINE

## 2019-03-27 PROCEDURE — 93306 TTE W/DOPPLER COMPLETE: CPT

## 2019-03-27 PROCEDURE — 99214 OFFICE O/P EST MOD 30 MIN: CPT | Performed by: INTERNAL MEDICINE

## 2019-03-27 RX ORDER — CARVEDILOL 6.25 MG/1
6.25 TABLET ORAL 2 TIMES DAILY
Qty: 180 TABLET | Refills: 3 | Status: SHIPPED | OUTPATIENT
Start: 2019-03-27 | End: 2020-01-16 | Stop reason: SDUPTHER

## 2019-03-27 NOTE — PROGRESS NOTES
Encounter Date:03/27/2019      Patient ID: Patricia Redmond is a 83 y.o. female.    Chief Complaint: Peripheral Vascular Disease; Claudication, class IV; Chest Pain; and Shortness of Breath      PROBLEM LIST:  1. Peripheral Vascular Disease  a. CTA lower extremities with contrast, 04/24/2018:   1. Right: Right superficial femoral artery is occluded. Popliteal is reconstituted by collaterals and then occluded distally. Peroneal trunk is occluded. Anterior and posterior tibial are occluded and then reconstituted by collaterals.  2. Left: Left superficial femoral artery is occluded. Popliteal is occluded. Common peroneal trunk and anterior tibial artery reconstituted by collaterals.  2. Claudication.  3. Valvular heart disease  a. Echo, 05/21/2018: Left atrial cavity size is mildly dilated. Estimated EF 56-60%. LV diastolic dysfunction (grade I) c/w impaired relaxation. Moderate aortic valve stenosis is present. Mean gradient 21 mmHg. Mild MR/TR. RVSP within normal limits.  4. Hypertension  5. Dyslipidemia  6. DVT  7. TIA  8. Macular degeneration  9. Pseudoxanthoma elasticum  10. Hypothyroidism  11. Osteoporosis    History of Present Illness  Patient presents today for 6 month follow-up with a history of VHD, PVD and cardiac risk factors. Since last visit, she has been experiencing some shortness of breath with some mild exertion such as walking. she has also been experiencing some dizziness, which she believes might actually be vertigo. Denies chest pain, leg swelling, palpitations, and syncope. Remains busy and active with limitations due to VERDUZCO.    No Known Allergies      Current Outpatient Medications:   •  atorvastatin (LIPITOR) 20 MG tablet, Take 20 mg by mouth Daily., Disp: , Rfl: 3  •  calcium carbonate-vitamin d (CALTRATE 600+D) 600-400 MG-UNIT per tablet, Take 1 tablet by mouth Daily., Disp: , Rfl:   •  cilostazol (PLETAL) 50 MG tablet, Take 1 tablet by mouth 2 (Two) Times a Day Before Meals., Disp: 60  "tablet, Rfl: 11  •  ezetimibe (ZETIA) 10 MG tablet, Take 10 mg by mouth Daily., Disp: , Rfl: 2  •  levothyroxine (SYNTHROID, LEVOTHROID) 75 MCG tablet, Take 75 mcg by mouth Daily., Disp: , Rfl: 3  •  Multiple Vitamin (MULTI-VITAMIN DAILY PO), Take 1 tablet by mouth Daily., Disp: , Rfl:   •  Multiple Vitamins-Minerals (OCUVITE ADULT FORMULA PO), Take 1 tablet by mouth Daily., Disp: , Rfl:   •  NIFEdipine CC (ADALAT CC) 60 MG 24 hr tablet, Take 60 mg by mouth Daily., Disp: , Rfl: 1  •  vitamin B-12 (CYANOCOBALAMIN) 1000 MCG tablet, Take 2,000 mcg by mouth Daily., Disp: , Rfl:   •  Vitamin D, Cholecalciferol, 1000 units capsule, Take 2,000 Units by mouth Daily., Disp: , Rfl:   •  vitamin E 400 UNIT capsule, Take 400 Units by mouth Daily., Disp: , Rfl:   •  warfarin (COUMADIN) 5 MG tablet, Take 5 mg by mouth Daily., Disp: , Rfl:     The following portions of the patient's history were reviewed and updated as appropriate: allergies, current medications, past family history, past medical history, past social history, past surgical history and problem list.    ROS  Review of Systems   Constitution: Negative for chills, fatigue, fever, generalized weakness, weight gain and weight loss.   Cardiovascular: Negative for chest pain, claudication, leg swelling, orthopnea, palpitations, paroxysmal nocturnal dyspnea and syncope. Positive for VERDUZCO.  Respiratory: Negative for cough, shortness of breath, snoring, and wheezing.  HENT: Negative for ear pain, nosebleeds, and tinnitus.  Gastrointestinal: Negative for abdominal pain, constipation, diarrhea, nausea and vomiting.   Genitourinary: No urinary symptoms   Neurological: Negative for headaches, loss of balance, numbness, and symptoms of stroke. Positive for dizziness/vertigo.  Psychiatric: Normal mental status.     All other systems reviewed and are negative.    Objective:     /60 (BP Location: Left arm, Patient Position: Sitting)   Pulse 96   Ht 160 cm (63\")   Wt 67.5 kg " (148 lb 12.8 oz)   SpO2 96%   BMI 26.36 kg/m²          Physical Exam  Constitutional: Patient appears well-developed and well-nourished.   HENT: HEENT exam unremarkable.   Neck: Neck supple. No JVD present. No carotid bruits.   Cardiovascular: Normal rate, regular rhythm and normal heart sounds. 3/6 systolic ejection murmur heard.   2+ symmetric pulses.   Pulmonary/Chest: Breath sounds normal. Does not exhibit tenderness.   Abdominal: Abdomen benign.   Musculoskeletal: Does not exhibit edema.   Neurological: Neurological exam unremarkable.   Vitals reviewed.    Lab Review:   No recent lab results available for review.     Procedures       Assessment:      Diagnosis Plan   1. Peripheral vascular disease (CMS/HCC)  Stable, continue current medications.   2. Essential hypertension  DC nifedipine due to dizziness as she may be experiencing orthostatic. Start carvedilol 6.25 mg. Continue all other current medications.   3. Dyslipidemia  Continue atorvastatin 20 mg and Zetia.   4. Nonrheumatic aortic valve stenosis Echocardiogram.   5. Dizziness  24 hour Holter monitor given episodes of dizziness.     Plan:   DC nifedipine given episodes of dizziness and due to concern about orthostatic hypotension, exertional dyspnea is most probably due to deconditioning, exertional increase in heart rate and underlying aortic stenosis, we will start carvedilol 6.25 mg BID.  Also do a 24 hour Holter monitor for episodes of dizziness.  Echocardiogram to reassess heart and valve anatomy and function.  Continue all other current medications.   FU in 3 MO, sooner as needed.  Thank you for allowing us to participate in the care of your patient.     Scribed for Carolin Green MD by Kiki Yusuf. 3/27/2019  3:22 PM      I, Carolin Green MD, personally performed the services described in this documentation as scribed by the above named individual in my presence, and it is both accurate and complete.  3/27/2019  3:44 PM        Please note that  portions of this note may have been completed with a voice recognition program. Efforts were made to edit the dictations, but occasionally words are mistranscribed.

## 2019-04-15 ENCOUNTER — TELEPHONE (OUTPATIENT)
Dept: CARDIOLOGY | Facility: CLINIC | Age: 84
End: 2019-04-15

## 2019-04-15 NOTE — TELEPHONE ENCOUNTER
"Patient reports BP readings in the 200/100 range over the weekend.  States she \"didn't feel well\" starting on Friday.  Her ear has also been swollen and had some dried blood inside.  She went to the ER and was started on abx and drops.  BP remains high.  States she feels better today, no ear pain.  Denies CP, SOA.  BP today 190/105, HR 76.  Patient will see ENT MD on Wednesday.    Per Joe Jones PAC increase carvedilol to 12.5mg BID.  Continue to monitor BP/HR.    Patient notified and verbalized understanding.  "

## 2019-04-26 ENCOUNTER — TELEPHONE (OUTPATIENT)
Dept: CARDIOLOGY | Facility: CLINIC | Age: 84
End: 2019-04-26

## 2019-04-26 NOTE — TELEPHONE ENCOUNTER
Family called to report changes made by PCP to medications for BP control.    Patients' Carvedilol was previously increased to 12.5mg BID and Nifedipine 60mg discontinued.    PCP added Nifedipine 30mg at night, and Losartan/HCTZ 50/12.5mg daily.  Decreased Carvedilol back to 6.25mg BID.    FYI

## 2019-06-28 ENCOUNTER — OFFICE VISIT (OUTPATIENT)
Dept: CARDIOLOGY | Facility: CLINIC | Age: 84
End: 2019-06-28

## 2019-06-28 VITALS
DIASTOLIC BLOOD PRESSURE: 60 MMHG | OXYGEN SATURATION: 96 % | HEIGHT: 63 IN | HEART RATE: 78 BPM | SYSTOLIC BLOOD PRESSURE: 110 MMHG | BODY MASS INDEX: 26.15 KG/M2 | WEIGHT: 147.6 LBS

## 2019-06-28 DIAGNOSIS — I73.9 PERIPHERAL VASCULAR DISEASE (HCC): Primary | ICD-10-CM

## 2019-06-28 DIAGNOSIS — E78.5 DYSLIPIDEMIA: ICD-10-CM

## 2019-06-28 DIAGNOSIS — I35.0 NONRHEUMATIC AORTIC VALVE STENOSIS: ICD-10-CM

## 2019-06-28 DIAGNOSIS — I10 ESSENTIAL HYPERTENSION: ICD-10-CM

## 2019-06-28 PROCEDURE — 99214 OFFICE O/P EST MOD 30 MIN: CPT | Performed by: PHYSICIAN ASSISTANT

## 2019-06-28 RX ORDER — CILOSTAZOL 50 MG/1
50 TABLET ORAL
Qty: 60 TABLET | Refills: 11 | Status: SHIPPED | OUTPATIENT
Start: 2019-06-28 | End: 2020-01-16 | Stop reason: SDUPTHER

## 2019-06-28 RX ORDER — NIFEDIPINE 30 MG/1
1 TABLET, FILM COATED, EXTENDED RELEASE ORAL DAILY
Refills: 5 | COMMUNITY
Start: 2019-04-17 | End: 2021-03-23

## 2019-06-28 RX ORDER — LOSARTAN POTASSIUM AND HYDROCHLOROTHIAZIDE 12.5; 5 MG/1; MG/1
1 TABLET ORAL DAILY
COMMUNITY

## 2019-06-28 NOTE — PROGRESS NOTES
Encounter Date:06/28/2019        Patient ID: Patricia Redmond is a 84 y.o. female.    PCP: Emmett Martines MD     Chief Complaint: Peripheral Vascular Disease      PROBLEM LIST:  1. Peripheral Vascular Disease  a. CTA lower extremities with contrast, 04/24/2018:   1. Right: Right superficial femoral artery is occluded. Popliteal is reconstituted by collaterals and then occluded distally. Peroneal trunk is occluded. Anterior and posterior tibial are occluded and then reconstituted by collaterals.  2. Left: Left superficial femoral artery is occluded. Popliteal is occluded. Common peroneal trunk and anterior tibial artery reconstituted by collaterals.  2. Claudication.  3. Valvular heart disease:  a. Echocardiogram, 05/21/2018: EF 56-60%. Left atrial cavity size is mildly dilated. LV diastolic dysfunction (grade I) c/w impaired relaxation. Moderate aortic valve stenosis, mean gradient 21 mmHg. Mild MR/TR. RVSP within normal limits.  b. Echocardiogram, 03/27/2019: EF 65%. Mild concentric LVH. Impaired relaxation. Moderate aortic stenosis, CARLTON 1.2 cm2, mean gradient 26 mmHg. Mild MR/TR, normal RVSP.  4. Dizziness:   a. 24h Holter, 03/28/2019: Symptoms of SOA occured with sinus rhythm. Short runs of SVT 4-6 beats.  5. Hypertension  6. Dyslipidemia  7. DVT x 2 (circa 2002)  a. On chronic warfarin therapy.  8. TIA  9. 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 Hypothyroidism  12. Osteoporosis    History of Present Illness  Patient presents today for 3 month follow-up with a history of valvular heart disease, peripheral arterial disease, and cardiac risk factors.  At her last visit with our service she had complained of the symptoms exertional dyspnea.  She was taken off of nifedipine and started on carvedilol.  Unfortunately this led to very high blood pressures as high as 216 mmHg systolic.  Carvedilol was doubled to 12.5 mg twice daily with little to no effect.  She followed up with her primary care  physician who restarted her on nifedipine at 30 mg daily.  She comes in today with an extensive log of blood pressures which are predominantly 130-140 mmHg systolic.  She has no current complaint of claudication.  Her lipids are due to be checked next month at her annual physical with primary care.  She has no complaint of exertional chest pain or dyspnea denies orthopnea or PND.  She has some mild lower extremity edema which is chronic and self-limiting.  She adds that her primary care physician is added losartan with hydrochlorothiazide but she has noticed little change in lower extremity swelling.  She has no awareness of tachyarrhythmias, no dizziness or syncope.  She states compliance current medical regimen reports no significant side effects.    No Known Allergies      Current Outpatient Medications:   •  atorvastatin (LIPITOR) 20 MG tablet, Take 20 mg by mouth Daily., Disp: , Rfl: 3  •  calcium carbonate-vitamin d (CALTRATE 600+D) 600-400 MG-UNIT per tablet, Take 1 tablet by mouth Daily., Disp: , Rfl:   •  carvedilol (COREG) 6.25 MG tablet, Take 1 tablet by mouth 2 (Two) Times a Day., Disp: 180 tablet, Rfl: 3  •  cilostazol (PLETAL) 50 MG tablet, Take 1 tablet by mouth 2 (Two) Times a Day Before Meals., Disp: 60 tablet, Rfl: 11  •  ezetimibe (ZETIA) 10 MG tablet, Take 10 mg by mouth Daily., Disp: , Rfl: 2  •  levothyroxine (SYNTHROID, LEVOTHROID) 75 MCG tablet, Take 75 mcg by mouth Daily., Disp: , Rfl: 3  •  losartan-hydrochlorothiazide (HYZAAR) 50-12.5 MG per tablet, Take 1 tablet by mouth Daily., Disp: , Rfl:   •  Multiple Vitamin (MULTI-VITAMIN DAILY PO), Take 1 tablet by mouth Daily., Disp: , Rfl:   •  Multiple Vitamins-Minerals (OCUVITE ADULT FORMULA PO), Take 1 tablet by mouth Daily., Disp: , Rfl:   •  NIFEdipine CC (ADALAT CC) 30 MG 24 hr tablet, Take 1 tablet by mouth Daily., Disp: , Rfl: 5  •  vitamin B-12 (CYANOCOBALAMIN) 1000 MCG tablet, Take 2,000 mcg by mouth Daily., Disp: , Rfl:   •  Vitamin D,  "Cholecalciferol, 1000 units capsule, Take 2,000 Units by mouth Daily., Disp: , Rfl:   •  vitamin E 400 UNIT capsule, Take 400 Units by mouth Daily., Disp: , Rfl:   •  warfarin (COUMADIN) 5 MG tablet, Take 5 mg by mouth Daily. 2.5 mg on Tues Fri Sun, Disp: , Rfl:       The following portions of the patient's history were reviewed and updated as appropriate: allergies, current medications, past family history, past medical history, past social history, past surgical history and problem list.        ROS  Review of Systems   Constitution: Negative for chills, fatigue, fever, generalized weakness, weight gain and weight loss.   Cardiovascular: Negative for chest pain, claudication, dyspnea on exertion, leg swelling, orthopnea, palpitations, paroxysmal nocturnal dyspnea and syncope.   Respiratory: Negative for cough, shortness of breath, and wheezing.  HENT: Negative for ear pain, nosebleeds, and tinnitus.  Gastrointestinal: Negative for abdominal pain, constipation, diarrhea, nausea and vomiting.   Genitourinary: No urinary symptoms   Musculoskeletal: Negative for muscle cramps.  Neurological: Negative for dizziness, headaches, loss of balance, numbness, and symptoms of stroke.  Psychiatric: Normal mental status.     All other systems reviewed and are negative.    Objective:     /60 (BP Location: Right arm, Patient Position: Sitting)   Pulse 78   Ht 160 cm (63\")   Wt 67 kg (147 lb 9.6 oz)   SpO2 96%   BMI 26.15 kg/m²          Physical Exam  Constitutional: Patient appears well-developed and well-nourished.   HENT: HEENT exam unremarkable.   Neck: Neck supple. No JVD present. No carotid bruits.   Cardiovascular: Normal rate, regular rhythm and normal heart sounds.  2 /6 murmur heard.   2+ symmetric pulses.   Pulmonary/Chest: Breath sounds normal. Does not exhibit tenderness.   Abdominal: Abdomen benign.   Musculoskeletal: Trace edema bilateral lower extremities.   Neurological: Neurological exam unremarkable. "   Vitals reviewed.    Lab Review:     Procedures       Assessment:      Diagnosis Plan   1. Peripheral vascular disease (CMS/HCC)   no current claudication symptoms, refilled Pletal   2. Nonrheumatic aortic valve stenosis   stable   3. Essential hypertension   acceptable on current medical regimen.     4. Dyslipidemia   on statin therapy and managed by primary care     Plan:     Stable cardiac status.  Continue current medications.   FU in 6 MO, sooner as needed.  Thank you for allowing us to participate in the care of your patient.         Electronically signed by ELIZABETH Shields, 06/28/19, 3:22 PM.      Please note that portions of this note may have been completed with a voice recognition program. Efforts were made to edit the dictations, but occasionally words are mistranscribed.

## 2020-01-16 ENCOUNTER — OFFICE VISIT (OUTPATIENT)
Dept: CARDIAC SURGERY | Facility: CLINIC | Age: 85
End: 2020-01-16

## 2020-01-16 ENCOUNTER — OFFICE VISIT (OUTPATIENT)
Dept: CARDIOLOGY | Facility: CLINIC | Age: 85
End: 2020-01-16

## 2020-01-16 VITALS
HEART RATE: 75 BPM | TEMPERATURE: 98.3 F | BODY MASS INDEX: 26.75 KG/M2 | OXYGEN SATURATION: 99 % | WEIGHT: 151 LBS | SYSTOLIC BLOOD PRESSURE: 143 MMHG | DIASTOLIC BLOOD PRESSURE: 72 MMHG | HEIGHT: 63 IN

## 2020-01-16 VITALS
HEIGHT: 64 IN | BODY MASS INDEX: 25.95 KG/M2 | HEART RATE: 84 BPM | WEIGHT: 152 LBS | DIASTOLIC BLOOD PRESSURE: 60 MMHG | OXYGEN SATURATION: 97 % | SYSTOLIC BLOOD PRESSURE: 110 MMHG

## 2020-01-16 DIAGNOSIS — E78.5 DYSLIPIDEMIA: ICD-10-CM

## 2020-01-16 DIAGNOSIS — I73.9 PERIPHERAL VASCULAR DISEASE (HCC): Primary | ICD-10-CM

## 2020-01-16 DIAGNOSIS — I82.5Y1 CHRONIC DEEP VEIN THROMBOSIS (DVT) OF PROXIMAL VEIN OF RIGHT LOWER EXTREMITY (HCC): ICD-10-CM

## 2020-01-16 DIAGNOSIS — I73.9 PERIPHERAL VASCULAR DISEASE (HCC): ICD-10-CM

## 2020-01-16 DIAGNOSIS — I10 ESSENTIAL HYPERTENSION: ICD-10-CM

## 2020-01-16 DIAGNOSIS — I35.0 NONRHEUMATIC AORTIC VALVE STENOSIS: Primary | ICD-10-CM

## 2020-01-16 PROCEDURE — 99214 OFFICE O/P EST MOD 30 MIN: CPT | Performed by: INTERNAL MEDICINE

## 2020-01-16 PROCEDURE — 99213 OFFICE O/P EST LOW 20 MIN: CPT | Performed by: THORACIC SURGERY (CARDIOTHORACIC VASCULAR SURGERY)

## 2020-01-16 RX ORDER — CILOSTAZOL 50 MG/1
50 TABLET ORAL
Qty: 180 TABLET | Refills: 3 | Status: SHIPPED | OUTPATIENT
Start: 2020-01-16 | End: 2020-01-16 | Stop reason: SDUPTHER

## 2020-01-16 RX ORDER — CARVEDILOL 6.25 MG/1
6.25 TABLET ORAL 2 TIMES DAILY
Qty: 180 TABLET | Refills: 3 | Status: SHIPPED | OUTPATIENT
Start: 2020-01-16 | End: 2020-01-16 | Stop reason: SDUPTHER

## 2020-01-16 RX ORDER — CILOSTAZOL 50 MG/1
50 TABLET ORAL
Qty: 180 TABLET | Refills: 3 | Status: SHIPPED | OUTPATIENT
Start: 2020-01-16 | End: 2021-03-24

## 2020-01-16 RX ORDER — CARVEDILOL 6.25 MG/1
6.25 TABLET ORAL 2 TIMES DAILY
Qty: 180 TABLET | Refills: 3 | Status: SHIPPED | OUTPATIENT
Start: 2020-01-16 | End: 2020-08-14

## 2020-01-16 NOTE — PROGRESS NOTES
Encounter Date:01/16/2020      Patient ID: Patricia Redmond is a 84 y.o. female.    Chief Complaint: Follow-up of aortic stenosis/ PVD       PROBLEM LIST:  1. Peripheral Vascular Disease  a. CTA lower extremities with contrast, 04/24/2018:   1. Right: Right superficial femoral artery is occluded. Popliteal is reconstituted by collaterals and then occluded distally. Peroneal trunk is occluded. Anterior and posterior tibial are occluded and then reconstituted by collaterals.  2. Left: Left superficial femoral artery is occluded. Popliteal is occluded. Common peroneal trunk and anterior tibial artery reconstituted by collaterals.  2. Claudication.  3. Valvular heart disease:  a. Echocardiogram, 05/21/2018: EF 56-60%. Left atrial cavity size is mildly dilated. LV diastolic dysfunction (grade I) c/w impaired relaxation. Moderate aortic valve stenosis, mean gradient 21 mmHg. Mild MR/TR. RVSP within normal limits.  b. Echocardiogram, 03/27/2019: EF 65%. Mild concentric LVH. Impaired relaxation. Moderate aortic stenosis, CARLTON 1.2 cm2, mean gradient 26 mmHg. Mild MR/TR, normal RVSP.  4. Dizziness:   a. 24h Holter, 03/28/2019: Symptoms of SOA occured with sinus rhythm. Short runs of SVT 4-6 beats.  5. Hypertension  6. Dyslipidemia  7. DVT x 2 (circa 2002)  a. On chronic warfarin therapy.  8. TIA  9. 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 Hypothyroidism  12. Osteoporosis    History of Present Illness  Patient presents today for follow-up with a history of moderate aortic stenosis, PAD and cardiac risk factors.  Since last visit she has done very well.  Reports that since she started taking Pletal her walking distance is improved and claudications have significantly improved.  She has been active with household chores and personal care.  Does not drive due to visual deficits.  She is denying any chest pain significant dyspnea edema orthopnea PND palpitations or syncope.  States that rarely she gets mildly  lightheaded on for standing but this resolves if she gets up and walks.    No Known Allergies      Current Outpatient Medications:   •  atorvastatin (LIPITOR) 20 MG tablet, Take 20 mg by mouth Daily., Disp: , Rfl: 3  •  calcium carbonate-vitamin d (CALTRATE 600+D) 600-400 MG-UNIT per tablet, Take 1 tablet by mouth Daily., Disp: , Rfl:   •  carvedilol (COREG) 6.25 MG tablet, Take 1 tablet by mouth 2 (Two) Times a Day., Disp: 180 tablet, Rfl: 3  •  cilostazol (PLETAL) 50 MG tablet, Take 1 tablet by mouth 2 (Two) Times a Day Before Meals., Disp: 180 tablet, Rfl: 3  •  ezetimibe (ZETIA) 10 MG tablet, Take 10 mg by mouth Daily., Disp: , Rfl: 2  •  levothyroxine (SYNTHROID, LEVOTHROID) 75 MCG tablet, Take 75 mcg by mouth Daily., Disp: , Rfl: 3  •  losartan-hydrochlorothiazide (HYZAAR) 50-12.5 MG per tablet, Take 1 tablet by mouth Daily., Disp: , Rfl:   •  Multiple Vitamin (MULTI-VITAMIN DAILY PO), Take 1 tablet by mouth Daily., Disp: , Rfl:   •  Multiple Vitamins-Minerals (OCUVITE ADULT FORMULA PO), Take 1 tablet by mouth Daily., Disp: , Rfl:   •  NIFEdipine CC (ADALAT CC) 30 MG 24 hr tablet, Take 1 tablet by mouth Daily., Disp: , Rfl: 5  •  vitamin B-12 (CYANOCOBALAMIN) 1000 MCG tablet, Take 2,000 mcg by mouth Daily., Disp: , Rfl:   •  Vitamin D, Cholecalciferol, 1000 units capsule, Take 2,000 Units by mouth Daily., Disp: , Rfl:   •  vitamin E 400 UNIT capsule, Take 400 Units by mouth Daily., Disp: , Rfl:   •  warfarin (COUMADIN) 5 MG tablet, Take 5 mg by mouth Daily. 2.5 mg on Tues Fri Sun, Disp: , Rfl:     The following portions of the patient's history were reviewed and updated as appropriate: allergies, current medications, past family history, past medical history, past social history, past surgical history and problem list.    ROS  Review of Systems   Constitution: Negative for chills, fever, weight gain and weight loss.   Cardiovascular: Negative for chest pain, claudication, dyspnea on exertion, leg swelling,  "orthopnea, palpitations, paroxysmal nocturnal dyspnea and syncope.        No dizziness   Gastrointestinal: Negative for abdominal pain, constipation, diarrhea, nausea and vomiting.   Genitourinary:        No urinary symptoms   Neurological:        No symptoms of stroke.   All other systems reviewed and are negative.    Objective:     Blood pressure 110/60, pulse 84, height 162.6 cm (64\"), weight 68.9 kg (152 lb), SpO2 97 %.      Physical Exam  Constitutional: She appears well-developed and well-nourished.   HENT:   HEENT exam unremarkable.   Neck: Neck supple. No JVD present.   No carotid bruits.   Cardiovascular: Normal rate, regular rhythm and normal heart sounds.  3/6 systolic ejection murmur.  Pulmonary/Chest: Breath sounds normal. Does not exhibit tenderness.   Abdominal:   Abdomen benign.   Musculoskeletal: Does not exhibit edema.   Neurological:   Neurological exam unremarkable.   Vitals reviewed.    Lab Review:   Procedures       Assessment:      Diagnosis Plan   1. Nonrheumatic aortic valve stenosis, currently stable and asymptomatic, moderate Adult Transthoracic Echo Complete W/ Cont if Necessary Per Protocol with next visit.  Meanwhile she was advised to monitor for symptoms of angina, significant dyspnea, dizziness or syncope and report in case of such symptoms.   2. Peripheral vascular disease (CMS/HCC)  stable, continue Pletal and rest of current medications.   3. Essential hypertension  controlled on current medications.   4. Dyslipidemia  managed by PCP, continue Lipitor.     Plan:   Stable cardiac status.  Continue current medications.   FU in 6 MO with same-day echocardiogram, sooner as needed.  Thank you for allowing us to participate in the care of your patient.     Carolin Green MD, [facility], [facility]      Please note that portions of this note may have been completed with a voice recognition program. Efforts were made to edit the dictations, but occasionally words are mistranscribed.    "

## 2020-01-16 NOTE — PROGRESS NOTES
01/16/2020  Patient Information  Patricia Fraga Ruy                                                                                          [address] [place] [address]   1935  'PCP/Referring Physician'  Emmett Martines MD  [phone]  No ref. provider found    Chief Complaint   Patient presents with   • Follow-up     1 year follow up for peripheral vascular disease.   • Peripheral Vascular Disease     CC: I am doing very well but my legs still have these blue spots    History of Present Illness: 84-year-old  female being seen in follow-up for peripheral vascular disease.  When evaluated 1 year ago the patient was put on a regimen of walking to tolerance.  She is a lifelong non-smoker.  She does have hypertension and dyslipidemia.  She does not have diabetes.  Currently she states that if she walks a long distance she gets pain in her calves but that she thinks it somewhat better than it was a year ago.  She does not have rest pain.  She is not had motor or sensory loss in her feet.  She has had bilateral deep venous thrombosis in the past and has bilateral varicosities.  The varicosities are actually more of a problem for her than her arterial disease.      Patient Active Problem List   Diagnosis   • Hypertension   • Peripheral vascular disease (CMS/HCC)   • Dyslipidemia   • Macular degeneration   • Pseudoxanthoma elasticum   • Hypothyroidism   • Osteoporosis   • Deep vein thrombosis (DVT) of right lower extremity (CMS/HCC)   • Claudication, class IV (CMS/HCC)   • TIA (transient ischemic attack)   • Murmur, cardiac   • Nonrheumatic aortic valve stenosis     Past Medical History:   Diagnosis Date   • Degenerative disc disease, lumbar    • Dyslipidemia    • Hypertension    • Hypothyroidism    • Inguinal hernia    • Macular degeneration    • Osteoporosis    • Peripheral vascular disease (CMS/HCC)    • Pseudoxanthoma elasticum    • TIA (transient ischemic attack)      Past Surgical History:    Procedure Laterality Date   • HYSTERECTOMY     • INGUINAL HERNIA REPAIR Left    • INNER EAR SURGERY Right    • SYMPATHECTOMY     • TONSILLECTOMY         Current Outpatient Medications:   •  atorvastatin (LIPITOR) 20 MG tablet, Take 20 mg by mouth Daily., Disp: , Rfl: 3  •  calcium carbonate-vitamin d (CALTRATE 600+D) 600-400 MG-UNIT per tablet, Take 1 tablet by mouth Daily., Disp: , Rfl:   •  carvedilol (COREG) 6.25 MG tablet, Take 1 tablet by mouth 2 (Two) Times a Day., Disp: 180 tablet, Rfl: 3  •  cilostazol (PLETAL) 50 MG tablet, Take 1 tablet by mouth 2 (Two) Times a Day Before Meals., Disp: 180 tablet, Rfl: 3  •  ezetimibe (ZETIA) 10 MG tablet, Take 10 mg by mouth Daily., Disp: , Rfl: 2  •  levothyroxine (SYNTHROID, LEVOTHROID) 75 MCG tablet, Take 75 mcg by mouth Daily., Disp: , Rfl: 3  •  losartan-hydrochlorothiazide (HYZAAR) 50-12.5 MG per tablet, Take 1 tablet by mouth Daily., Disp: , Rfl:   •  Multiple Vitamin (MULTI-VITAMIN DAILY PO), Take 1 tablet by mouth Daily., Disp: , Rfl:   •  Multiple Vitamins-Minerals (OCUVITE ADULT FORMULA PO), Take 1 tablet by mouth Daily., Disp: , Rfl:   •  NIFEdipine CC (ADALAT CC) 30 MG 24 hr tablet, Take 1 tablet by mouth Daily., Disp: , Rfl: 5  •  vitamin B-12 (CYANOCOBALAMIN) 1000 MCG tablet, Take 2,000 mcg by mouth Daily., Disp: , Rfl:   •  Vitamin D, Cholecalciferol, 1000 units capsule, Take 2,000 Units by mouth Daily., Disp: , Rfl:   •  vitamin E 400 UNIT capsule, Take 400 Units by mouth Daily., Disp: , Rfl:   •  warfarin (COUMADIN) 5 MG tablet, Take 5 mg by mouth Daily. 2.5 mg on Tues Fri Sun, Disp: , Rfl:   No Known Allergies  Social History     Socioeconomic History   • Marital status:      Spouse name: Not on file   • Number of children: 4   • Years of education: Not on file   • Highest education level: Not on file   Occupational History   • Occupation: retired/factory woker/school cafeteria   Tobacco Use   • Smoking status: Never Smoker   • Smokeless  tobacco: Never Used   Substance and Sexual Activity   • Alcohol use: No   • Drug use: No   • Sexual activity: Defer   Social History Narrative    [place] alone in [facility] [place]     Family History   Problem Relation Age of Onset   • Hypertension Mother    • Sick sinus syndrome Mother    • Heart failure Mother    • Atrial fibrillation Father    • Valvular heart disease Brother    • Hyperlipidemia Sister    • Hypertension Sister    • Hyperlipidemia Sister    • Hypertension Sister    • Hyperlipidemia Sister    • Hypertension Sister    • Hyperlipidemia Sister    • Hypertension Sister      Review of Systems   Constitution: Positive for malaise/fatigue. Negative for chills, fever, night sweats and weight loss.   HENT: Positive for hearing loss. Negative for odynophagia and sore throat.    Eyes: Negative.    Cardiovascular: Positive for claudication, dyspnea on exertion and leg swelling. Negative for chest pain, orthopnea and palpitations.   Respiratory: Negative.  Negative for cough and hemoptysis.    Endocrine: Negative.  Negative for cold intolerance, heat intolerance, polydipsia, polyphagia and polyuria.   Hematologic/Lymphatic: Bruises/bleeds easily.   Skin: Negative.  Negative for itching and rash.   Musculoskeletal: Positive for back pain. Negative for joint pain, joint swelling and myalgias.   Gastrointestinal: Negative.  Negative for abdominal pain, constipation, diarrhea, hematemesis, hematochezia, melena, nausea and vomiting.   Genitourinary: Positive for frequency and nocturia. Negative for dysuria and hematuria.   Neurological: Negative.  Negative for focal weakness, headaches, numbness and seizures.   Psychiatric/Behavioral: Negative.  Negative for suicidal ideas.   Allergic/Immunologic: Negative.    All other systems reviewed and are negative.    Vitals:    01/16/20 1040   BP: 143/72   BP Location: Right arm   Patient Position: Sitting   Pulse: 75   Temp: 98.3 °F (36.8 °C)   SpO2: 99%   Weight: 68.5 kg (151 lb)  "  Height: 160 cm (63\")      Physical Exam   Constitutional:   Pleasant elderly  female no acute distress   Neck:   No carotid bruits   Cardiovascular: Regular rhythm.   Murmur heard.  1/6 systolic murmur left sternal border   Pulmonary/Chest:   Lungs are clear to percussion and auscultation.  No rales, rhonchi, or wheezes.   Musculoskeletal:   Right VENUS 0.7 left VENUS 0.65 dorsalis pedis and posterior tibial pulses are present with the Doppler bilaterally  With loud biphasic signals.  Above ABIs were done by me with a standard cuff on the cath.   Skin: Skin is warm and dry.   Psychiatric: She has a normal mood and affect. Her behavior is normal. Judgment and thought content normal.       Labs/Imaging: None    Assessment: Bilateral peripheral vascular disease without evidence of ischemia.  The patient does have claudication and she has significant bilateral varicosities.  The varicosities provide most of her symptoms.    Plan: Continue current treatment regimen.  Return to clinic in 1 year with ABIs.    Patient Active Problem List   Diagnosis   • Hypertension   • Peripheral vascular disease (CMS/HCC)   • Dyslipidemia   • Macular degeneration   • Pseudoxanthoma elasticum   • Hypothyroidism   • Osteoporosis   • Deep vein thrombosis (DVT) of right lower extremity (CMS/HCC)   • Claudication, class IV (CMS/HCC)   • TIA (transient ischemic attack)   • Murmur, cardiac   • Nonrheumatic aortic valve stenosis         Jaylon Cárdenas MD  [facility]  01/20/20   1:47 PM                           "

## 2020-08-14 ENCOUNTER — HOSPITAL ENCOUNTER (OUTPATIENT)
Dept: CARDIOLOGY | Facility: HOSPITAL | Age: 85
Discharge: HOME | End: 2020-08-14
Admitting: INTERNAL MEDICINE

## 2020-08-14 ENCOUNTER — OFFICE VISIT (OUTPATIENT)
Dept: CARDIOLOGY | Facility: CLINIC | Age: 85
End: 2020-08-14

## 2020-08-14 VITALS
DIASTOLIC BLOOD PRESSURE: 64 MMHG | BODY MASS INDEX: 24.59 KG/M2 | WEIGHT: 144 LBS | SYSTOLIC BLOOD PRESSURE: 122 MMHG | HEIGHT: 64 IN | HEART RATE: 65 BPM

## 2020-08-14 VITALS — WEIGHT: 152 LBS | HEIGHT: 64 IN | BODY MASS INDEX: 25.95 KG/M2

## 2020-08-14 DIAGNOSIS — E78.5 DYSLIPIDEMIA: ICD-10-CM

## 2020-08-14 DIAGNOSIS — I73.9 PERIPHERAL VASCULAR DISEASE (HCC): ICD-10-CM

## 2020-08-14 DIAGNOSIS — I35.0 NONRHEUMATIC AORTIC VALVE STENOSIS: Primary | ICD-10-CM

## 2020-08-14 DIAGNOSIS — I35.0 NONRHEUMATIC AORTIC VALVE STENOSIS: ICD-10-CM

## 2020-08-14 DIAGNOSIS — I10 ESSENTIAL HYPERTENSION: ICD-10-CM

## 2020-08-14 DIAGNOSIS — I82.5Y1 CHRONIC DEEP VEIN THROMBOSIS (DVT) OF PROXIMAL VEIN OF RIGHT LOWER EXTREMITY (HCC): ICD-10-CM

## 2020-08-14 LAB
BH CV ECHO MEAS - AO MAX PG (FULL): 36.4 MMHG
BH CV ECHO MEAS - AO MAX PG: 41 MMHG
BH CV ECHO MEAS - AO MEAN PG (FULL): 17.9 MMHG
BH CV ECHO MEAS - AO MEAN PG: 19.7 MMHG
BH CV ECHO MEAS - AO ROOT AREA (BSA CORRECTED): 1.7
BH CV ECHO MEAS - AO ROOT AREA: 7.2 CM^2
BH CV ECHO MEAS - AO ROOT DIAM: 3 CM
BH CV ECHO MEAS - AO V2 MAX: 320.6 CM/SEC
BH CV ECHO MEAS - AO V2 MEAN: 200.8 CM/SEC
BH CV ECHO MEAS - AO V2 VTI: 84.6 CM
BH CV ECHO MEAS - AVA(I,A): 0.79 CM^2
BH CV ECHO MEAS - AVA(I,D): 0.79 CM^2
BH CV ECHO MEAS - AVA(V,A): 0.61 CM^2
BH CV ECHO MEAS - AVA(V,D): 0.61 CM^2
BH CV ECHO MEAS - BSA(HAYCOCK): 1.8 M^2
BH CV ECHO MEAS - BSA: 1.7 M^2
BH CV ECHO MEAS - BZI_BMI: 26.3 KILOGRAMS/M^2
BH CV ECHO MEAS - BZI_METRIC_HEIGHT: 162 CM
BH CV ECHO MEAS - BZI_METRIC_WEIGHT: 68.9 KG
BH CV ECHO MEAS - EDV(CUBED): 52.2 ML
BH CV ECHO MEAS - EDV(MOD-SP2): 80.5 ML
BH CV ECHO MEAS - EDV(MOD-SP4): 85.2 ML
BH CV ECHO MEAS - EDV(TEICH): 59.5 ML
BH CV ECHO MEAS - EF(CUBED): 62 %
BH CV ECHO MEAS - EF(MOD-BP): 52.2 %
BH CV ECHO MEAS - EF(MOD-SP2): 62 %
BH CV ECHO MEAS - EF(MOD-SP4): 42.5 %
BH CV ECHO MEAS - EF(TEICH): 54.4 %
BH CV ECHO MEAS - ESV(CUBED): 19.8 ML
BH CV ECHO MEAS - ESV(MOD-SP2): 30.6 ML
BH CV ECHO MEAS - ESV(MOD-SP4): 49 ML
BH CV ECHO MEAS - ESV(TEICH): 27.2 ML
BH CV ECHO MEAS - FS: 27.6 %
BH CV ECHO MEAS - IVS/LVPW: 1.1
BH CV ECHO MEAS - IVSD: 0.96 CM
BH CV ECHO MEAS - LA DIMENSION: 3 CM
BH CV ECHO MEAS - LA/AO: 0.99
BH CV ECHO MEAS - LAD MAJOR: 5.9 CM
BH CV ECHO MEAS - LAT PEAK E' VEL: 6.6 CM/SEC
BH CV ECHO MEAS - LATERAL E/E' RATIO: 13.8
BH CV ECHO MEAS - LV DIASTOLIC VOL/BSA (35-75): 49.1 ML/M^2
BH CV ECHO MEAS - LV MASS(C)D: 99.5 GRAMS
BH CV ECHO MEAS - LV MASS(C)DI: 57.3 GRAMS/M^2
BH CV ECHO MEAS - LV MAX PG: 4.6 MMHG
BH CV ECHO MEAS - LV MEAN PG: 1.8 MMHG
BH CV ECHO MEAS - LV SYSTOLIC VOL/BSA (12-30): 28.2 ML/M^2
BH CV ECHO MEAS - LV V1 MAX: 107.5 CM/SEC
BH CV ECHO MEAS - LV V1 MEAN: 56.1 CM/SEC
BH CV ECHO MEAS - LV V1 VTI: 36.4 CM
BH CV ECHO MEAS - LVIDD: 3.7 CM
BH CV ECHO MEAS - LVIDS: 2.7 CM
BH CV ECHO MEAS - LVLD AP2: 6.9 CM
BH CV ECHO MEAS - LVLD AP4: 7 CM
BH CV ECHO MEAS - LVLS AP2: 5.8 CM
BH CV ECHO MEAS - LVLS AP4: 6.2 CM
BH CV ECHO MEAS - LVOT AREA (M): 1.8 CM^2
BH CV ECHO MEAS - LVOT AREA: 1.8 CM^2
BH CV ECHO MEAS - LVOT DIAM: 1.5 CM
BH CV ECHO MEAS - LVPWD: 0.85 CM
BH CV ECHO MEAS - MED PEAK E' VEL: 7.7 CM/SEC
BH CV ECHO MEAS - MEDIAL E/E' RATIO: 11.8
BH CV ECHO MEAS - MV A MAX VEL: 86.5 CM/SEC
BH CV ECHO MEAS - MV DEC SLOPE: 467.4 CM/SEC^2
BH CV ECHO MEAS - MV DEC TIME: 0.2 SEC
BH CV ECHO MEAS - MV E MAX VEL: 91.3 CM/SEC
BH CV ECHO MEAS - MV E/A: 1.1
BH CV ECHO MEAS - PA ACC TIME: 0.15 SEC
BH CV ECHO MEAS - PA PR(ACCEL): 12.5 MMHG
BH CV ECHO MEAS - RAP SYSTOLE: 8 MMHG
BH CV ECHO MEAS - RVSP: 28 MMHG
BH CV ECHO MEAS - SI(AO): 352.2 ML/M^2
BH CV ECHO MEAS - SI(CUBED): 18.7 ML/M^2
BH CV ECHO MEAS - SI(LVOT): 38.3 ML/M^2
BH CV ECHO MEAS - SI(MOD-SP2): 28.7 ML/M^2
BH CV ECHO MEAS - SI(MOD-SP4): 20.8 ML/M^2
BH CV ECHO MEAS - SI(TEICH): 18.6 ML/M^2
BH CV ECHO MEAS - SV(AO): 611.5 ML
BH CV ECHO MEAS - SV(CUBED): 32.4 ML
BH CV ECHO MEAS - SV(LVOT): 66.6 ML
BH CV ECHO MEAS - SV(MOD-SP2): 49.9 ML
BH CV ECHO MEAS - SV(MOD-SP4): 36.2 ML
BH CV ECHO MEAS - SV(TEICH): 32.4 ML
BH CV ECHO MEAS - TAPSE (>1.6): 1.5 CM
BH CV ECHO MEAS - TR MAX PG: 20 MMHG
BH CV ECHO MEAS - TR MAX VEL: 220.6 CM/SEC
BH CV ECHO MEASUREMENTS AVERAGE E/E' RATIO: 12.77
BH CV VAS BP LEFT ARM: NORMAL MMHG
BH CV XLRA - RV BASE: 2.9 CM
BH CV XLRA - RV LENGTH: 4.1 CM
BH CV XLRA - RV MID: 2.1 CM
BH CV XLRA - TDI S': 8.9 CM/SEC
LV EF 2D ECHO EST: 60 %
MAXIMAL PREDICTED HEART RATE: 136 BPM
STRESS TARGET HR: 116 BPM

## 2020-08-14 PROCEDURE — 93306 TTE W/DOPPLER COMPLETE: CPT | Performed by: INTERNAL MEDICINE

## 2020-08-14 PROCEDURE — 99214 OFFICE O/P EST MOD 30 MIN: CPT | Performed by: INTERNAL MEDICINE

## 2020-08-14 PROCEDURE — 93306 TTE W/DOPPLER COMPLETE: CPT

## 2020-08-14 RX ORDER — CARVEDILOL 6.25 MG/1
3.12 TABLET ORAL 2 TIMES DAILY
Qty: 180 TABLET | Refills: 3 | Status: SHIPPED | OUTPATIENT
Start: 2020-08-14 | End: 2021-02-19 | Stop reason: SDUPTHER

## 2020-08-14 RX ORDER — FAMOTIDINE 40 MG/1
40 TABLET, FILM COATED ORAL DAILY
COMMUNITY
End: 2021-03-23

## 2020-08-14 NOTE — PROGRESS NOTES
[facility]    Encounter Date: 2020    Patient ID: Patricia Redmond is a 85 y.o. female.  : 1935     PCP: Emmett Martines MD       Chief Complaint: Nonrheumatic aortic valve stenosis      PROBLEM LIST:  1. Peripheral Artery Disease:  a. Claudication.  b. CTA lower extremities with contrast, 2018:   1. Right: Right SFA is occluded. Popliteal is reconstituted by collaterals and then occluded distally. Peroneal trunk is occluded. Anterior and posterior tibial are occluded and then reconstituted by collaterals.  2. Left: Left SFA is occluded. Popliteal is occluded. Common peroneal trunk and anterior tibial artery reconstituted by collaterals.  2. Aortic valve stenosis:  a. Echocardiogram, 2018: EF 56-60%. Left atrial cavity size is mildly dilated. LV diastolic dysfunction (grade I). Moderate AS, mean gradient 21 mmHg. Mild MR/TR. RVSP within normal limits.  b. Echocardiogram, 2019: EF 65%. Mild concentric LVH. Impaired relaxation. Moderate AS, CARLTON 1.2 cm2, mean gradient 26 mmHg. Mild MR/TR, normal RVSP.  c. Echocardiogram, 2020: EF 60%  Moderate AS, mean gradient 20 mmHg. Mild MR/TR.  3. Dizziness:   a. 24h Holter, 2019: Symptoms of SOA occured with sinus rhythm. Short runs of SVT 4-6 beats.  4. Hypertension  5. Dyslipidemia  6. DVT x 2 (circa )  a. On chronic warfarin therapy.  7. TIA  8. Macular degeneration  9. Pseudoxanthoma elasticum  10. Hypothyroidism  11. Osteoporosis    History of Present Illness  Patient presents today for a 6 month follow-up with a history of PAD, aortic stenosis, and cardiac risk factors. Since last visit, she has done well from a cardiac standpoint.  She is hearing her pulse in her left ear at times.  She denies symptoms of chest pain, unusual shortness of breath, palpitations or syncope.  She does note mild lower extremity edema and fatigue.  Her blood pressure has been well controlled at home. She  stays active with walking and working in her yard.     No Known Allergies      Current Outpatient Medications:   •  atorvastatin (LIPITOR) 20 MG tablet, Take 20 mg by mouth Daily., Disp: , Rfl: 3  •  calcium carbonate-vitamin d (CALTRATE 600+D) 600-400 MG-UNIT per tablet, Take 1 tablet by mouth Daily., Disp: , Rfl:   •  carvedilol (COREG) 6.25 MG tablet, Take 1 tablet by mouth 2 (Two) Times a Day., Disp: 180 tablet, Rfl: 3  •  cilostazol (PLETAL) 50 MG tablet, Take 1 tablet by mouth 2 (Two) Times a Day Before Meals., Disp: 180 tablet, Rfl: 3  •  ezetimibe (ZETIA) 10 MG tablet, Take 10 mg by mouth Daily., Disp: , Rfl: 2  •  famotidine (PEPCID) 40 MG tablet, Take 40 mg by mouth Daily., Disp: , Rfl:   •  levothyroxine (SYNTHROID, LEVOTHROID) 75 MCG tablet, Take 75 mcg by mouth Daily., Disp: , Rfl: 3  •  losartan-hydrochlorothiazide (HYZAAR) 50-12.5 MG per tablet, Take 1 tablet by mouth Daily., Disp: , Rfl:   •  Multiple Vitamin (MULTI-VITAMIN DAILY PO), Take 1 tablet by mouth Daily., Disp: , Rfl:   •  Multiple Vitamins-Minerals (OCUVITE ADULT FORMULA PO), Take 1 tablet by mouth Daily., Disp: , Rfl:   •  NIFEdipine CC (ADALAT CC) 30 MG 24 hr tablet, Take 1 tablet by mouth Daily., Disp: , Rfl: 5  •  vitamin B-12 (CYANOCOBALAMIN) 1000 MCG tablet, Take 2,000 mcg by mouth Daily., Disp: , Rfl:   •  Vitamin D, Cholecalciferol, 1000 units capsule, Take 2,000 Units by mouth Daily., Disp: , Rfl:   •  vitamin E 400 UNIT capsule, Take 400 Units by mouth Daily., Disp: , Rfl:   •  warfarin (COUMADIN) 5 MG tablet, Take 5 mg by mouth Daily. 2.5 mg on Tues Fri Sun, Disp: , Rfl:     The following portions of the patient's history were reviewed and updated as appropriate: allergies, current medications, past family history, past medical history, past social history, past surgical history and problem list.    ROS  Review of Systems   Constitution: Negative for chills, fever, fatigue, generalized weakness.   Cardiovascular: Negative for  "chest pain, dyspnea on exertion, leg swelling, palpitations, orthopnea, and syncope.   Respiratory: Negative for cough, shortness of breath, and wheezing.  HENT: Negative for ear pain, nosebleeds, and tinnitus.  Gastrointestinal: Negative for abdominal pain, constipation, diarrhea, nausea and vomiting.   Genitourinary: No urinary symptoms.  Musculoskeletal: Negative for muscle cramps.  Neurological: Negative for dizziness, headaches, loss of balance, numbness, and symptoms of stroke.  Psychiatric: Normal mental status.     All other systems reviewed and are negative.        Objective:     /64 (BP Location: Left arm, Patient Position: Sitting)   Pulse 65   Ht 162.6 cm (64\")   Wt 65.3 kg (144 lb)   BMI 24.72 kg/m²      Physical Exam  Constitutional: Patient appears well-developed and well-nourished.   HENT: HEENT exam unremarkable.   Neck: Neck supple. No JVD present. No carotid bruits.   Cardiovascular: Normal rate, regular rhythm and normal heart sounds. 2/6 HATTIE RUSB   2+ symmetric pulses.   Pulmonary/Chest: Breath sounds normal. Does not exhibit tenderness.   Abdominal: Abdomen benign.   Musculoskeletal: Does not exhibit edema.   Neurological: Neurological exam unremarkable.   Vitals reviewed.    Data Review:      Echocardiogram results were reviewed with the patient and her daughter.    Procedures       Assessment:      Diagnosis Plan   1. Nonrheumatic aortic valve stenosis  Echo performed today is stable- moderate AS   2. Essential hypertension  Controlled   3. Dyslipidemia  Continue Lipitor   4. Peripheral vascular disease (CMS/HCC)     5. 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  Continue Atorvastatin for hyperlipidemia.  Continue Warfarin for DVT  Continue current medications.   FU in 6  MO, sooner as needed.  Repeat Echocardiogram in 1 year- 8/2021  Thank you for allowing us to participate " in the care of your patient.     Scribed for Carolin Green MD by Yumiko Watts RN. 8/14/2020  11:38      I, Carolin Green MD, personally performed the services described in this documentation as scribed by the above named individual in my presence, and it is both accurate and complete.  8/14/2020  13:51        Please note that portions of this note may have been completed with a voice recognition program. Efforts were made to edit the dictations, but occasionally words are mistranscribed.

## 2021-02-22 RX ORDER — CARVEDILOL 6.25 MG/1
3.12 TABLET ORAL 2 TIMES DAILY
Qty: 180 TABLET | Refills: 3 | Status: SHIPPED | OUTPATIENT
Start: 2021-02-22 | End: 2021-02-24

## 2021-02-24 RX ORDER — CARVEDILOL 3.12 MG/1
3.12 TABLET ORAL 2 TIMES DAILY
Qty: 180 TABLET | Refills: 3 | Status: SHIPPED | OUTPATIENT
Start: 2021-02-24 | End: 2021-02-25 | Stop reason: DRUGHIGH

## 2021-02-25 RX ORDER — CARVEDILOL 12.5 MG/1
12.5 TABLET ORAL 2 TIMES DAILY
Qty: 180 TABLET | Refills: 3 | Status: SHIPPED | OUTPATIENT
Start: 2021-02-25 | End: 2021-03-23 | Stop reason: SDUPTHER

## 2021-02-25 NOTE — TELEPHONE ENCOUNTER
2/17: 88/60 87  2/18: 130/80 74  2/19: 129/79 71  2/20: 115/64 78  2/21: 119/65 86  2/22: 140/81 72   2/23: 111/62 90  2/24: 145/79 83  2/25: 102/62 80      Daughter reiterates pt has been taking 12.5 mg BID. Encouraged pt to continue current dose, continue monitoring and keep scheduled apt for follow up in March. New rx sent.

## 2021-02-25 NOTE — TELEPHONE ENCOUNTER
Pt daughter calling to discuss pt's Coreg dosage. Apparently pt has been taking two 6.25 mg tablets twice a day due to elevated blood pressures. Daughter states Rip previously advised them to titrate medications as needed. Daughter is not home to give me exact readings at this time. Encouraged daughter to clal back once she has the readings and I will determine dosing after that.

## 2021-03-23 ENCOUNTER — OFFICE VISIT (OUTPATIENT)
Dept: CARDIOLOGY | Facility: CLINIC | Age: 86
End: 2021-03-23

## 2021-03-23 VITALS
HEART RATE: 76 BPM | WEIGHT: 152 LBS | BODY MASS INDEX: 26.93 KG/M2 | OXYGEN SATURATION: 97 % | HEIGHT: 63 IN | DIASTOLIC BLOOD PRESSURE: 84 MMHG | SYSTOLIC BLOOD PRESSURE: 160 MMHG

## 2021-03-23 DIAGNOSIS — I10 ESSENTIAL HYPERTENSION: ICD-10-CM

## 2021-03-23 DIAGNOSIS — I35.0 NONRHEUMATIC AORTIC VALVE STENOSIS: Primary | ICD-10-CM

## 2021-03-23 DIAGNOSIS — E78.5 DYSLIPIDEMIA: ICD-10-CM

## 2021-03-23 DIAGNOSIS — I73.9 PERIPHERAL VASCULAR DISEASE (HCC): ICD-10-CM

## 2021-03-23 PROCEDURE — 99214 OFFICE O/P EST MOD 30 MIN: CPT | Performed by: INTERNAL MEDICINE

## 2021-03-23 RX ORDER — CARVEDILOL 25 MG/1
25 TABLET ORAL 2 TIMES DAILY
Qty: 180 TABLET | Refills: 3 | Status: SHIPPED | OUTPATIENT
Start: 2021-03-23 | End: 2021-10-15 | Stop reason: SDUPTHER

## 2021-03-23 RX ORDER — LANSOPRAZOLE 30 MG/1
30 CAPSULE, DELAYED RELEASE ORAL DAILY
COMMUNITY

## 2021-03-23 RX ORDER — FERROUS SULFATE 325(65) MG
650 TABLET ORAL EVERY OTHER DAY
COMMUNITY

## 2021-03-23 NOTE — PROGRESS NOTES
[facility]    Encounter Date: 2021    Patient ID: Patricia Redmond is a 85 y.o. female.  : 1935     PCP: Emmett Martines MD       Chief Complaint: Nonrheumatic Aortic Valve Stenosis      PROBLEM LIST:  1. Peripheral Artery Disease:  a. Claudication.  b. CTA lower extremities with contrast, 2018:   1. Right: Right SFA is occluded. Popliteal is reconstituted by collaterals and then occluded distally. Peroneal trunk is occluded. Anterior and posterior tibial are occluded and then reconstituted by collaterals.  2. Left: Left SFA is occluded. Popliteal is occluded. Common peroneal trunk and anterior tibial artery reconstituted by collaterals.  2. Aortic valve stenosis:  a. Echocardiogram, 2018: EF 56-60%. Left atrial cavity size is mildly dilated. LV diastolic dysfunction (grade I). Moderate AS, mean gradient 21 mmHg. Mild MR/TR. RVSP within normal limits.  b. Echocardiogram, 2019: EF 65%. Mild concentric LVH. Impaired relaxation. Moderate AS, CARLTON 1.2 cm2, mean gradient 26 mmHg. Mild MR/TR, normal RVSP.  c. Echocardiogram, 2020: EF 60%  Moderate AS, mean gradient 20 mmHg. Mild MR/TR.  3. Dizziness:   a. 24h Holter, 2019: Symptoms of SOA occured with sinus rhythm. Short runs of SVT 4-6 beats.  4. Hypertension  5. Dyslipidemia  6. DVT x 2 (circa )  a. On chronic warfarin therapy.  7. TIA  8. Macular degeneration  9. Pseudoxanthoma elasticum  10. Hypothyroidism  11. Osteoporosis    History of Present Illness  Patient presents today for a 6 month follow-up with a history of nonrheumatic aortic valve stenosis, peripheral vascular disease, DVT, and cardiac risk factors. Since last visit, she has been feeling well overall from a cardiovascular standpoint. She stays busy by working on her yard. She notes that her blood pressure has been fluctuating. Her systolic pressure has been running in the 120's to 160's. Her blood pressure first began  running high in January 2021 so she increased her carvedilol from 6.25 to 12.5 mg BID. She sometimes monitors her blood pressure 3 to 4 times per day. She is no longer taking nifedipine. She does not eat a lot of salt. She has also been experiencing lower extremity edema and right leg pain. She takes warfarin daily and gets her INR checked but she is unsure what her INR has been. Patient denies chest pain, shortness of breath, palpitations, dizziness, and syncope.       No Known Allergies      Current Outpatient Medications:   •  atorvastatin (LIPITOR) 20 MG tablet, Take 20 mg by mouth Daily., Disp: , Rfl: 3  •  calcium carbonate-vitamin d (CALTRATE 600+D) 600-400 MG-UNIT per tablet, Take 1 tablet by mouth Daily., Disp: , Rfl:   •  carvedilol (COREG) 12.5 MG tablet, Take 1 tablet by mouth 2 (Two) Times a Day., Disp: 180 tablet, Rfl: 3  •  cilostazol (PLETAL) 50 MG tablet, Take 1 tablet by mouth 2 (Two) Times a Day Before Meals., Disp: 180 tablet, Rfl: 3  •  ezetimibe (ZETIA) 10 MG tablet, Take 10 mg by mouth Daily., Disp: , Rfl: 2  •  ferrous sulfate 325 (65 FE) MG tablet, Take 650 mg by mouth Every Other Day., Disp: , Rfl:   •  lansoprazole (PREVACID) 30 MG capsule, Take 30 mg by mouth Daily., Disp: , Rfl:   •  levothyroxine (SYNTHROID, LEVOTHROID) 75 MCG tablet, Take 75 mcg by mouth Daily., Disp: , Rfl: 3  •  losartan-hydrochlorothiazide (HYZAAR) 50-12.5 MG per tablet, Take 1 tablet by mouth Daily., Disp: , Rfl:   •  Multiple Vitamin (MULTI-VITAMIN DAILY PO), Take 1 tablet by mouth Daily., Disp: , Rfl:   •  Multiple Vitamins-Minerals (OCUVITE ADULT FORMULA PO), Take 1 tablet by mouth Daily., Disp: , Rfl:   •  vitamin B-12 (CYANOCOBALAMIN) 1000 MCG tablet, Take 2,000 mcg by mouth Daily., Disp: , Rfl:   •  Vitamin D, Cholecalciferol, 1000 units capsule, Take 2,000 Units by mouth Daily., Disp: , Rfl:   •  vitamin E 400 UNIT capsule, Take 400 Units by mouth Daily., Disp: , Rfl:   •  warfarin (COUMADIN) 5 MG tablet,  "Take 5 mg by mouth Daily. 2.5 mg on Thursday & Sunday, 5mg other days, Disp: , Rfl:     The following portions of the patient's history were reviewed and updated as appropriate: allergies, current medications, past family history, past medical history, past social history, past surgical history and problem list.    ROS  Review of Systems   Constitution: Negative for chills, fever, fatigue, generalized weakness.   Cardiovascular: Negative for chest pain, dyspnea on exertion, palpitations, orthopnea, and syncope. Positive for leg swelling.   Respiratory: Negative for cough, shortness of breath, and wheezing.  HENT: Negative for ear pain, nosebleeds, and tinnitus.  Gastrointestinal: Negative for abdominal pain, constipation, diarrhea, nausea and vomiting.   Genitourinary: No urinary symptoms.  Musculoskeletal: Negative for muscle cramps.  Neurological: Negative for dizziness, headaches, loss of balance, numbness, and symptoms of stroke.  Psychiatric: Normal mental status.     All other systems reviewed and are negative.        Objective:     /84 (BP Location: Right arm, Patient Position: Sitting)   Pulse 76   Ht 160 cm (63\")   Wt 68.9 kg (152 lb)   SpO2 97%   BMI 26.93 kg/m²    Repeat BP: 170/80    Physical Exam  Constitutional: Patient appears well-developed and well-nourished.   HENT: HEENT exam unremarkable.   Neck: Neck supple. No JVD present. No carotid bruits.   Cardiovascular: Normal rate, regular rhythm and normal heart sounds. 3/6 systolic ejection murmur.   2+ symmetric pulses.   Pulmonary/Chest: Breath sounds normal. Does not exhibit tenderness.   Abdominal: Abdomen benign.   Musculoskeletal: Does not exhibit edema.   Neurological: Neurological exam unremarkable.   Vitals reviewed.    Data Review:        Procedures       Assessment:      Diagnosis Plan   1. Nonrheumatic aortic valve stenosis  Stable and asymptomatic; continue current medications. Echocardiogram ordered for 6 months from now.    2. " Peripheral vascular disease (CMS/HCC)  Stable; continue Pletal 50 mg BID.    3. Essential hypertension  Poor control; increase carvedilol from 12.5 to 25 mg BID and continue all other medications. Goal average systolic pressure of 140 torr or less. Monitor blood pressure twice per day.    4. Dyslipidemia  Monitored by PCP; continue atorvastatin 20 mg daily.      Plan:   Increase carvedilol from 12.5 mg to 25 mg BID.   Patient advised to check her blood pressure only twice a day and if the average is 140 or less there is no need to keep checking it several times as her blood pressure related stress may be causing some of the hypertension.  Echocardiogram ordered for 6 months from now with same-day visit at [facility].  Overall states she is stable without current angina CHF dizziness or syncope.   Continue current medications.   FU in 6 MO with same day echo, sooner as needed.  Advanced care planning to be discussed with MAINOR Whyte.   Thank you for allowing us to participate in the care of your patient.     Scribed for Carolin Green MD by Eliz Hui. 3/23/2021  11:15 EDT      I, Carolin Green MD, personally performed the services described in this documentation as scribed by the above named individual in my presence, and it is both accurate and complete.  3/23/2021  11:56 EDT        Please note that portions of this note may have been completed with a voice recognition program. Efforts were made to edit the dictations, but occasionally words are mistranscribed.

## 2021-03-24 RX ORDER — CILOSTAZOL 50 MG/1
TABLET ORAL
Qty: 180 TABLET | Refills: 3 | Status: SHIPPED | OUTPATIENT
Start: 2021-03-24 | End: 2021-10-15 | Stop reason: SDUPTHER

## 2021-06-23 DIAGNOSIS — I73.9 PERIPHERAL VASCULAR DISEASE (HCC): Primary | ICD-10-CM

## 2021-07-02 ENCOUNTER — HOSPITAL ENCOUNTER (OUTPATIENT)
Dept: ULTRASOUND IMAGING | Facility: HOSPITAL | Age: 86
Discharge: HOME | End: 2021-07-02
Admitting: NURSE PRACTITIONER

## 2021-07-02 DIAGNOSIS — I73.9 PERIPHERAL VASCULAR DISEASE (HCC): ICD-10-CM

## 2021-07-02 PROCEDURE — 93922 UPR/L XTREMITY ART 2 LEVELS: CPT | Performed by: RADIOLOGY

## 2021-07-02 PROCEDURE — 93922 UPR/L XTREMITY ART 2 LEVELS: CPT

## 2021-07-08 ENCOUNTER — OFFICE VISIT (OUTPATIENT)
Dept: CARDIAC SURGERY | Facility: CLINIC | Age: 86
End: 2021-07-08

## 2021-07-08 VITALS
OXYGEN SATURATION: 99 % | SYSTOLIC BLOOD PRESSURE: 160 MMHG | HEART RATE: 72 BPM | BODY MASS INDEX: 26.22 KG/M2 | TEMPERATURE: 96.9 F | DIASTOLIC BLOOD PRESSURE: 90 MMHG | HEIGHT: 63 IN | WEIGHT: 148 LBS

## 2021-07-08 DIAGNOSIS — I73.9 PERIPHERAL VASCULAR DISEASE (HCC): Primary | ICD-10-CM

## 2021-07-08 DIAGNOSIS — G45.9 TIA (TRANSIENT ISCHEMIC ATTACK): ICD-10-CM

## 2021-07-08 DIAGNOSIS — I82.5Y1 CHRONIC DEEP VEIN THROMBOSIS (DVT) OF PROXIMAL VEIN OF RIGHT LOWER EXTREMITY (HCC): ICD-10-CM

## 2021-07-08 DIAGNOSIS — H35.30 MACULAR DEGENERATION OF BOTH EYES, UNSPECIFIED TYPE: ICD-10-CM

## 2021-07-08 PROCEDURE — 99213 OFFICE O/P EST LOW 20 MIN: CPT | Performed by: THORACIC SURGERY (CARDIOTHORACIC VASCULAR SURGERY)

## 2021-07-08 NOTE — PROGRESS NOTES
07/08/2021  Patient Information  Patricia Flakito Ruy                                                                                          [address]   [place] [address]   1935  'PCP/Referring Physician'  Emmett Martines MD  No ref. provider found  Chief Complaint   Patient presents with   • Follow-up     1 YR FU with PVD for PVD       CC: I feel okay but my legs still hurt when I walk    History of Present Illness: 86-year-old  female being seen in follow-up for her known peripheral vascular disease.  This lady states that when she walks she gets typical claudication in both calves.  This usually occurs after she has walked approximately the length of a football field.  She states that if she walks carefully at the mall in [place] she can walk about a mile before she has to stop and rest.  She does not have pain in her feet.  She has never had ulcerations in her feet.  She does not have rest pain.  She does get edema of her lower extremities and has had this issue for about 15 years.  She also has bilateral varicosities.  She has a history of DVT in the past.  This patient has other comorbidities.  She denies diabetes.  She has hypertension and hyperlipidemia.  She is a lifelong non-smoker.  There is no significant family history of premature atherosclerotic disease.  Prior to this clinic visit she had ABIs done in [place].  Right VENUS is 0.84 left VENUS is 0.78.  Pulse volume recordings were not done.      Patient Active Problem List   Diagnosis   • Hypertension   • Peripheral vascular disease (CMS/HCC)   • Dyslipidemia   • Macular degeneration   • Pseudoxanthoma elasticum   • Hypothyroidism   • Osteoporosis   • Deep vein thrombosis (DVT) of right lower extremity (CMS/HCC)   • Claudication, class IV (CMS/HCC)   • TIA (transient ischemic attack)   • Murmur, cardiac   • Nonrheumatic aortic valve stenosis     Past Medical History:   Diagnosis Date   • Degenerative disc disease, lumbar    •  Dyslipidemia    • Hypertension    • Hypothyroidism    • Inguinal hernia    • Macular degeneration    • Osteoporosis    • Peripheral vascular disease (CMS/HCC)    • Pseudoxanthoma elasticum    • TIA (transient ischemic attack)      Past Surgical History:   Procedure Laterality Date   • HYSTERECTOMY     • INGUINAL HERNIA REPAIR Left    • INNER EAR SURGERY Right    • SYMPATHECTOMY     • TONSILLECTOMY         Current Outpatient Medications:   •  atorvastatin (LIPITOR) 20 MG tablet, Take 20 mg by mouth Daily., Disp: , Rfl: 3  •  calcium carbonate-vitamin d (CALTRATE 600+D) 600-400 MG-UNIT per tablet, Take 1 tablet by mouth Daily., Disp: , Rfl:   •  carvedilol (COREG) 25 MG tablet, Take 1 tablet by mouth 2 (Two) Times a Day., Disp: 180 tablet, Rfl: 3  •  cilostazol (PLETAL) 50 MG tablet, TAKE 1 TABLET BY MOUTH TWICE DAILY BEFORE MEALS, Disp: 180 tablet, Rfl: 3  •  ezetimibe (ZETIA) 10 MG tablet, Take 10 mg by mouth Daily., Disp: , Rfl: 2  •  ferrous sulfate 325 (65 FE) MG tablet, Take 650 mg by mouth Every Other Day., Disp: , Rfl:   •  lansoprazole (PREVACID) 30 MG capsule, Take 30 mg by mouth Daily., Disp: , Rfl:   •  levothyroxine (SYNTHROID, LEVOTHROID) 75 MCG tablet, Take 75 mcg by mouth Daily., Disp: , Rfl: 3  •  losartan-hydrochlorothiazide (HYZAAR) 50-12.5 MG per tablet, Take 1 tablet by mouth Daily., Disp: , Rfl:   •  Multiple Vitamin (MULTI-VITAMIN DAILY PO), Take 1 tablet by mouth Daily., Disp: , Rfl:   •  Multiple Vitamins-Minerals (OCUVITE ADULT FORMULA PO), Take 1 tablet by mouth Daily., Disp: , Rfl:   •  vitamin B-12 (CYANOCOBALAMIN) 1000 MCG tablet, Take 2,000 mcg by mouth Daily., Disp: , Rfl:   •  Vitamin D, Cholecalciferol, 1000 units capsule, Take 2,000 Units by mouth Daily., Disp: , Rfl:   •  vitamin E 400 UNIT capsule, Take 400 Units by mouth Daily., Disp: , Rfl:   •  warfarin (COUMADIN) 5 MG tablet, Take 5 mg by mouth Daily. 2.5 mg on Thursday & Sunday, 5mg other days, Disp: , Rfl:   No Known  Allergies  Social History     Socioeconomic History   • Marital status:      Spouse name: Not on file   • Number of children: 4   • Years of education: Not on file   • Highest education level: Not on file   Tobacco Use   • Smoking status: Never Smoker   • Smokeless tobacco: Never Used   Substance and Sexual Activity   • Alcohol use: No   • Drug use: No   • Sexual activity: Defer     Family History   Problem Relation Age of Onset   • Hypertension Mother    • Sick sinus syndrome Mother    • Heart failure Mother    • Atrial fibrillation Father    • Valvular heart disease Brother    • Hyperlipidemia Sister    • Hypertension Sister    • Hyperlipidemia Sister    • Hypertension Sister    • Hyperlipidemia Sister    • Hypertension Sister    • Hyperlipidemia Sister    • Hypertension Sister        ROS, past medical history, surgical history, family history, social history and vitals  reviewed by me and corrected as needed.        Review of Systems   Constitutional: Positive for malaise/fatigue. Negative for chills, fever, night sweats and weight loss.   HENT: Negative for hearing loss, odynophagia and sore throat.    Cardiovascular: Positive for claudication, dyspnea on exertion and leg swelling. Negative for chest pain, orthopnea and palpitations.   Respiratory: Negative for cough and hemoptysis.    Endocrine: Negative for cold intolerance, heat intolerance, polydipsia, polyphagia and polyuria.   Hematologic/Lymphatic: Does not bruise/bleed easily.   Skin: Negative for itching and rash.   Musculoskeletal: Positive for back pain. Negative for joint pain, joint swelling and myalgias.   Gastrointestinal: Negative for abdominal pain, constipation, diarrhea, hematemesis, hematochezia, melena, nausea and vomiting.   Genitourinary: Negative for dysuria, frequency and hematuria.   Neurological: Positive for light-headedness. Negative for focal weakness, headaches, numbness and seizures.   Psychiatric/Behavioral: Negative for  "suicidal ideas.   All other systems reviewed and are negative.      Vitals:    07/08/21 0945   BP: 160/90   BP Location: Left arm   Patient Position: Sitting   Pulse: 72   Temp: 96.9 °F (36.1 °C)   SpO2: 99%   Weight: 67.1 kg (148 lb)   Height: 160 cm (63\")        Physical Exam  Vitals and nursing note reviewed.   Constitutional:       General: She is not in acute distress.     Appearance: Normal appearance. She is normal weight.   HENT:      Head: Normocephalic and atraumatic.      Right Ear: External ear normal.      Left Ear: External ear normal.      Nose: Nose normal.      Mouth/Throat:      Mouth: Mucous membranes are moist.   Eyes:      Extraocular Movements: Extraocular movements intact.      Pupils: Pupils are equal, round, and reactive to light.   Neck:      Vascular: No carotid bruit.   Cardiovascular:      Rate and Rhythm: Normal rate and regular rhythm.      Pulses: Normal pulses.      Heart sounds: Normal heart sounds. No murmur heard.     Pulmonary:      Effort: Pulmonary effort is normal. No respiratory distress.      Breath sounds: No wheezing, rhonchi or rales.   Abdominal:      General: Abdomen is flat. Bowel sounds are normal.      Palpations: Abdomen is soft. There is no mass.      Tenderness: There is no abdominal tenderness. There is no guarding.   Musculoskeletal:         General: No swelling or tenderness.      Cervical back: Normal range of motion. No rigidity. No muscular tenderness.      Right lower leg: No edema.      Left lower leg: No edema.      Comments: Skin and nails on both feet are normal.  Pulses are present with the Doppler.  Capillary refill is 2 seconds bilaterally.  There are bilateral varicose veins.  The right calf is larger than the left calf.  There is no calf tenderness.  Bilateral varicose veins are noted with no significant areas of inflammation and no evidence of venous stasis.   Lymphadenopathy:      Cervical: No cervical adenopathy.   Skin:     General: Skin is " warm and dry.      Capillary Refill: Capillary refill takes less than 2 seconds.      Coloration: Skin is not jaundiced.      Findings: No erythema.   Neurological:      General: No focal deficit present.      Mental Status: She is alert and oriented to person, place, and time. Mental status is at baseline.   Psychiatric:         Mood and Affect: Mood normal.         Behavior: Behavior normal.         Thought Content: Thought content normal.         Judgment: Judgment normal.         Labs: None    Imaging: ABIs noted and reviewed and compared to previous exams.    Assessment: Peripheral vascular disease without evidence of ischemia.  Probable inflow occlusive disease.    Plan: The patient will continue her program of walking to tolerance.  She will obtain repeat ABIs in 1 year with follow-up telephone visit.   I have reviewed, verified, and confirmed the above history and current status.  I have examined the patient and confirmed the above physical findings.Above plan and treatment regimen discussed in detail with patient.  Options of treatment, attendant risks vs benefits, and my recommendations were discussed and all questions answered.    Jaylon Cárdenas MD  [facility]  07/08/21   11:36 EDT

## 2021-10-15 ENCOUNTER — HOSPITAL ENCOUNTER (OUTPATIENT)
Dept: CARDIOLOGY | Facility: HOSPITAL | Age: 86
Discharge: HOME | End: 2021-10-15
Admitting: INTERNAL MEDICINE

## 2021-10-15 ENCOUNTER — OFFICE VISIT (OUTPATIENT)
Dept: CARDIOLOGY | Facility: CLINIC | Age: 86
End: 2021-10-15

## 2021-10-15 VITALS
SYSTOLIC BLOOD PRESSURE: 100 MMHG | HEART RATE: 76 BPM | WEIGHT: 145 LBS | DIASTOLIC BLOOD PRESSURE: 66 MMHG | BODY MASS INDEX: 25.69 KG/M2 | OXYGEN SATURATION: 96 % | HEIGHT: 63 IN

## 2021-10-15 VITALS — BODY MASS INDEX: 26.22 KG/M2 | HEIGHT: 63 IN | WEIGHT: 148 LBS

## 2021-10-15 DIAGNOSIS — I73.9 PERIPHERAL VASCULAR DISEASE (HCC): ICD-10-CM

## 2021-10-15 DIAGNOSIS — I35.0 NONRHEUMATIC AORTIC VALVE STENOSIS: ICD-10-CM

## 2021-10-15 DIAGNOSIS — I35.0 NONRHEUMATIC AORTIC VALVE STENOSIS: Primary | ICD-10-CM

## 2021-10-15 DIAGNOSIS — I10 ESSENTIAL HYPERTENSION: ICD-10-CM

## 2021-10-15 DIAGNOSIS — E78.5 DYSLIPIDEMIA: ICD-10-CM

## 2021-10-15 DIAGNOSIS — R55 SYNCOPE AND COLLAPSE: ICD-10-CM

## 2021-10-15 LAB
BH CV ECHO MEAS - AO MAX PG (FULL): 38.7 MMHG
BH CV ECHO MEAS - AO MAX PG: 45 MMHG
BH CV ECHO MEAS - AO MEAN PG (FULL): 22.8 MMHG
BH CV ECHO MEAS - AO MEAN PG: 26.8 MMHG
BH CV ECHO MEAS - AO ROOT AREA (BSA CORRECTED): 1.7
BH CV ECHO MEAS - AO ROOT AREA: 6.9 CM^2
BH CV ECHO MEAS - AO ROOT DIAM: 3 CM
BH CV ECHO MEAS - AO V2 MAX: 335.3 CM/SEC
BH CV ECHO MEAS - AO V2 MEAN: 243.6 CM/SEC
BH CV ECHO MEAS - AO V2 VTI: 95.2 CM
BH CV ECHO MEAS - ASC AORTA: 2.9 CM
BH CV ECHO MEAS - AVA(I,A): 1.1 CM^2
BH CV ECHO MEAS - AVA(I,D): 1.1 CM^2
BH CV ECHO MEAS - AVA(V,A): 1.1 CM^2
BH CV ECHO MEAS - AVA(V,D): 1.1 CM^2
BH CV ECHO MEAS - BSA(HAYCOCK): 1.7 M^2
BH CV ECHO MEAS - BSA: 1.7 M^2
BH CV ECHO MEAS - BZI_BMI: 26.2 KILOGRAMS/M^2
BH CV ECHO MEAS - BZI_METRIC_HEIGHT: 160 CM
BH CV ECHO MEAS - BZI_METRIC_WEIGHT: 67.1 KG
BH CV ECHO MEAS - EDV(CUBED): 38.5 ML
BH CV ECHO MEAS - EDV(MOD-SP2): 77 ML
BH CV ECHO MEAS - EDV(MOD-SP4): 88 ML
BH CV ECHO MEAS - EDV(TEICH): 46.6 ML
BH CV ECHO MEAS - EF(CUBED): 71.3 %
BH CV ECHO MEAS - EF(MOD-BP): 63 %
BH CV ECHO MEAS - EF(MOD-SP2): 62.3 %
BH CV ECHO MEAS - EF(MOD-SP4): 65.9 %
BH CV ECHO MEAS - EF(TEICH): 64.1 %
BH CV ECHO MEAS - ESV(CUBED): 11.1 ML
BH CV ECHO MEAS - ESV(MOD-SP2): 29 ML
BH CV ECHO MEAS - ESV(MOD-SP4): 30 ML
BH CV ECHO MEAS - ESV(TEICH): 16.7 ML
BH CV ECHO MEAS - FS: 34 %
BH CV ECHO MEAS - IVS/LVPW: 1
BH CV ECHO MEAS - IVSD: 1 CM
BH CV ECHO MEAS - LA DIMENSION: 4 CM
BH CV ECHO MEAS - LA/AO: 1.3
BH CV ECHO MEAS - LAD MAJOR: 6.2 CM
BH CV ECHO MEAS - LAT PEAK E' VEL: 4.2 CM/SEC
BH CV ECHO MEAS - LATERAL E/E' RATIO: 23.7
BH CV ECHO MEAS - LV DIASTOLIC VOL/BSA (35-75): 51.7 ML/M^2
BH CV ECHO MEAS - LV IVRT: 0.07 SEC
BH CV ECHO MEAS - LV MASS(C)D: 103.5 GRAMS
BH CV ECHO MEAS - LV MASS(C)DI: 60.9 GRAMS/M^2
BH CV ECHO MEAS - LV MAX PG: 6.3 MMHG
BH CV ECHO MEAS - LV MEAN PG: 4 MMHG
BH CV ECHO MEAS - LV SYSTOLIC VOL/BSA (12-30): 17.6 ML/M^2
BH CV ECHO MEAS - LV V1 MAX: 125 CM/SEC
BH CV ECHO MEAS - LV V1 MEAN: 94.2 CM/SEC
BH CV ECHO MEAS - LV V1 VTI: 34 CM
BH CV ECHO MEAS - LVIDD: 3.4 CM
BH CV ECHO MEAS - LVIDS: 2.2 CM
BH CV ECHO MEAS - LVLD AP2: 6.8 CM
BH CV ECHO MEAS - LVLD AP4: 6.7 CM
BH CV ECHO MEAS - LVLS AP2: 6.1 CM
BH CV ECHO MEAS - LVLS AP4: 5.2 CM
BH CV ECHO MEAS - LVOT AREA (M): 3.1 CM^2
BH CV ECHO MEAS - LVOT AREA: 3.1 CM^2
BH CV ECHO MEAS - LVOT DIAM: 2 CM
BH CV ECHO MEAS - LVPWD: 1 CM
BH CV ECHO MEAS - MED PEAK E' VEL: 4.1 CM/SEC
BH CV ECHO MEAS - MEDIAL E/E' RATIO: 24
BH CV ECHO MEAS - MR ALIAS VEL: 38.5 CM/SEC
BH CV ECHO MEAS - MR ERO: 0.17 CM^2
BH CV ECHO MEAS - MR FLOW RATE: 97.3 CM^3/SEC
BH CV ECHO MEAS - MR MAX PG: 141 MMHG
BH CV ECHO MEAS - MR MAX VEL: 589.8 CM/SEC
BH CV ECHO MEAS - MR MEAN PG: 85.3 MMHG
BH CV ECHO MEAS - MR MEAN VEL: 430.7 CM/SEC
BH CV ECHO MEAS - MR PISA RADIUS: 0.63 CM
BH CV ECHO MEAS - MR PISA: 2.5 CM^2
BH CV ECHO MEAS - MR VOLUME: 39.5 ML
BH CV ECHO MEAS - MR VTI: 239.6 CM
BH CV ECHO MEAS - MV A MAX VEL: 91.9 CM/SEC
BH CV ECHO MEAS - MV AREA (1 DIAM): 6.6 CM^2
BH CV ECHO MEAS - MV DEC SLOPE: 410.2 CM/SEC^2
BH CV ECHO MEAS - MV DEC TIME: 0.19 SEC
BH CV ECHO MEAS - MV DIAM: 2.9 CM
BH CV ECHO MEAS - MV E MAX VEL: 102.2 CM/SEC
BH CV ECHO MEAS - MV E/A: 1.1
BH CV ECHO MEAS - MV FLOW AREA(1DIAM): 6.6 CM^2
BH CV ECHO MEAS - MV MAX PG: 5.1 MMHG
BH CV ECHO MEAS - MV MEAN PG: 1.8 MMHG
BH CV ECHO MEAS - MV P1/2T MAX VEL: 115.4 CM/SEC
BH CV ECHO MEAS - MV P1/2T: 82.4 MSEC
BH CV ECHO MEAS - MV V2 MAX: 112.7 CM/SEC
BH CV ECHO MEAS - MV V2 MEAN: 60.4 CM/SEC
BH CV ECHO MEAS - MV V2 VTI: 42.1 CM
BH CV ECHO MEAS - MVA P1/2T LCG: 1.9 CM^2
BH CV ECHO MEAS - MVA(P1/2T): 2.7 CM^2
BH CV ECHO MEAS - MVA(VTI): 2.5 CM^2
BH CV ECHO MEAS - PA ACC SLOPE: 1011 CM/SEC^2
BH CV ECHO MEAS - PA ACC TIME: 0.05 SEC
BH CV ECHO MEAS - PA MAX PG: 2.4 MMHG
BH CV ECHO MEAS - PA PR(ACCEL): 56.8 MMHG
BH CV ECHO MEAS - PA V2 MAX: 77.4 CM/SEC
BH CV ECHO MEAS - PI END-D VEL: 97.7 CM/SEC
BH CV ECHO MEAS - RAP SYSTOLE: 3 MMHG
BH CV ECHO MEAS - RF(MV,AO)(1 DIAM): -1.4
BH CV ECHO MEAS - RF(MV,LVOT)(1DIAM): 0.62
BH CV ECHO MEAS - RVSP: 28 MMHG
BH CV ECHO MEAS - SI(AO): 384.6 ML/M^2
BH CV ECHO MEAS - SI(CUBED): 16.1 ML/M^2
BH CV ECHO MEAS - SI(LVOT): 61.5 ML/M^2
BH CV ECHO MEAS - SI(MOD-SP2): 28.2 ML/M^2
BH CV ECHO MEAS - SI(MOD-SP4): 34.1 ML/M^2
BH CV ECHO MEAS - SI(MV 1 DIAM): 163.1 ML/M^2
BH CV ECHO MEAS - SI(TEICH): 17.6 ML/M^2
BH CV ECHO MEAS - SV(AO): 654.5 ML
BH CV ECHO MEAS - SV(CUBED): 27.4 ML
BH CV ECHO MEAS - SV(LVOT): 104.6 ML
BH CV ECHO MEAS - SV(MOD-SP2): 48 ML
BH CV ECHO MEAS - SV(MOD-SP4): 58 ML
BH CV ECHO MEAS - SV(MV 1 DIAM): 277.5 ML
BH CV ECHO MEAS - SV(TEICH): 29.9 ML
BH CV ECHO MEAS - TAPSE (>1.6): 2.1 CM
BH CV ECHO MEAS - TR MAX PG: 25 MMHG
BH CV ECHO MEAS - TR MAX VEL: 249.8 CM/SEC
BH CV ECHO MEASUREMENTS AVERAGE E/E' RATIO: 24.63
BH CV VAS BP LEFT ARM: NORMAL MMHG
BH CV XLRA - RV BASE: 3.1 CM
BH CV XLRA - RV LENGTH: 5.8 CM
BH CV XLRA - RV MID: 2.6 CM
BH CV XLRA - TDI S': 8.48 CM/SEC
LEFT ATRIUM VOLUME INDEX: 47.6 ML/M^2
LEFT ATRIUM VOLUME: 81 ML
LV EF 2D ECHO EST: 65 %

## 2021-10-15 PROCEDURE — 99214 OFFICE O/P EST MOD 30 MIN: CPT | Performed by: INTERNAL MEDICINE

## 2021-10-15 PROCEDURE — 93306 TTE W/DOPPLER COMPLETE: CPT | Performed by: INTERNAL MEDICINE

## 2021-10-15 PROCEDURE — 93306 TTE W/DOPPLER COMPLETE: CPT

## 2021-10-15 RX ORDER — CILOSTAZOL 50 MG/1
50 TABLET ORAL
Qty: 180 TABLET | Refills: 3 | Status: SHIPPED | OUTPATIENT
Start: 2021-10-15 | End: 2022-07-15 | Stop reason: SDUPTHER

## 2021-10-15 RX ORDER — CARVEDILOL 25 MG/1
25 TABLET ORAL 2 TIMES DAILY
Qty: 180 TABLET | Refills: 3 | Status: SHIPPED | OUTPATIENT
Start: 2021-10-15 | End: 2022-07-15 | Stop reason: SDUPTHER

## 2021-10-15 NOTE — PROGRESS NOTES
[facility]    Encounter Date: 10/15/2021    Patient ID: Patricia Redmond is a 86 y.o. female.  : 1935     PCP: Emmett Martines MD       Chief Complaint: echo f/u      PROBLEM LIST:  1. Peripheral Artery Disease:  a. Claudication.  b. CTA lower extremities with contrast, 2018:   1. Right: Right SFA is occluded. Popliteal is reconstituted by collaterals and then occluded distally. Peroneal trunk is occluded. Anterior and posterior tibial are occluded and then reconstituted by collaterals.  2. Left: Left SFA is occluded. Popliteal is occluded. Common peroneal trunk and anterior tibial artery reconstituted by collaterals.  2. VENUS, 2021: Left 0.78 and right 0.84  2. Aortic valve stenosis:  a. Echocardiogram, 2018: EF 56-60%. Left atrial cavity size is mildly dilated. LV diastolic dysfunction (grade I). Moderate AS, mean gradient 21 mmHg. Mild MR/TR. RVSP within normal limits.  b. Echocardiogram, 2019: EF 65%. Mild concentric LVH. Impaired relaxation. Moderate AS, CARLTON 1.2 cm2, mean gradient 26 mmHg. Mild MR/TR, normal RVSP.  c. Echocardiogram, 2020: EF 60%  Moderate AS, mean gradient 20 mmHg. Mild MR/TR.  d. Echocardiogram, 10/15/2021: EF 65%. Moderate AS, mean gradient 26.8 and CARLTON 1.1 cm². Moderate MR. Mild TR with normal RVSP. Mild LVH.   3. Dizziness:   a. 24h Holter, 2019: Symptoms of SOA occured with sinus rhythm. Short runs of SVT 4-6 beats.  4. Hypertension  5. Dyslipidemia  6. DVT x 2 (circa )  a. On chronic warfarin therapy.  7. TIA  8. Macular degeneration  9. Pseudoxanthoma elasticum  10. Hypothyroidism  11. Osteoporosis    History of Present Illness  Patient presents today for a 6 month follow-up with a history of nonrheumatic aortic vavle stenosis, peripheral vascular disease, and cardiac risk factors. Since last visit, she's experienced occasional shortness of breath with exertion and reports one syncopal episode this  "past August. Prior to event she had taken all her medications, ate breakfast, and had 1-2 cups of coffee. She was walking to hang up a phone on the wall and then woke up on the floor. She felt fatigue/weakness prior to episode. She denies any injury except for \"her face feeling funny\" There was no one around to witness it nor did she tell anyone about it until today. She monitors her blood pressure at home which fluctuates sometimes between . Her blood pressure is typically low in the mornings and after taking her medications she experiences weakness. The lowest her blood pressure has been was 89/56 and had to rest for it to improve. On the same day her pressure increased to 165 at noon. She takes all medications at 8 am and 8 pm. Patient denies chest pain, palpitations, edema, and dizziness.       No Known Allergies      Current Outpatient Medications:   •  atorvastatin (LIPITOR) 20 MG tablet, Take 20 mg by mouth Daily., Disp: , Rfl: 3  •  calcium carbonate-vitamin d (CALTRATE 600+D) 600-400 MG-UNIT per tablet, Take 2 tablets by mouth Daily., Disp: , Rfl:   •  carvedilol (COREG) 25 MG tablet, Take 1 tablet by mouth 2 (Two) Times a Day., Disp: 180 tablet, Rfl: 3  •  cilostazol (PLETAL) 50 MG tablet, Take 1 tablet by mouth 2 (Two) Times a Day Before Meals., Disp: 180 tablet, Rfl: 3  •  ezetimibe (ZETIA) 10 MG tablet, Take 10 mg by mouth Daily., Disp: , Rfl: 2  •  ferrous sulfate 325 (65 FE) MG tablet, Take 650 mg by mouth Every Other Day., Disp: , Rfl:   •  lansoprazole (PREVACID) 30 MG capsule, Take 30 mg by mouth Daily., Disp: , Rfl:   •  levothyroxine (SYNTHROID, LEVOTHROID) 75 MCG tablet, Take 75 mcg by mouth Daily., Disp: , Rfl: 3  •  losartan-hydrochlorothiazide (HYZAAR) 50-12.5 MG per tablet, Take 1 tablet by mouth Daily., Disp: , Rfl:   •  Multiple Vitamin (MULTI-VITAMIN DAILY PO), Take 1 tablet by mouth Daily., Disp: , Rfl:   •  Multiple Vitamins-Minerals (OCUVITE ADULT FORMULA PO), Take 1 tablet by " "mouth Daily., Disp: , Rfl:   •  vitamin B-12 (CYANOCOBALAMIN) 1000 MCG tablet, Take 2,000 mcg by mouth Daily., Disp: , Rfl:   •  Vitamin D, Cholecalciferol, 1000 units capsule, Take 2,000 Units by mouth Daily., Disp: , Rfl:   •  vitamin E 400 UNIT capsule, Take 400 Units by mouth Daily., Disp: , Rfl:   •  warfarin (COUMADIN) 5 MG tablet, Take 5 mg by mouth Daily. 2.5 mg on Thursday & Sunday, 5mg other days, Disp: , Rfl:     The following portions of the patient's history were reviewed and updated as appropriate: allergies, current medications, past family history, past medical history, past social history, past surgical history and problem list.    ROS  Review of Systems   Constitution: Negative for chills, fever. Positive for fatigue and generalized weakness.   Cardiovascular: Negative for chest pain, dyspnea on exertion, leg swelling, palpitations, orthopnea. Positive for syncope.   Respiratory: Negative for cough, and wheezing. Positive for shortness of breath  HENT: Negative for ear pain, nosebleeds, and tinnitus.  Gastrointestinal: Negative for abdominal pain, constipation, diarrhea, nausea and vomiting.   Genitourinary: No urinary symptoms.  Musculoskeletal: Negative for muscle cramps.  Neurological: Negative for dizziness, headaches, loss of balance, numbness, and symptoms of stroke.  Psychiatric: Normal mental status.     All other systems reviewed and are negative.        Objective:     /66 (BP Location: Left arm, Patient Position: Sitting)   Pulse 76   Ht 160 cm (63\")   Wt 65.8 kg (145 lb)   SpO2 96%   BMI 25.69 kg/m²    BP repeat: 122/60    Physical Exam  Constitutional: Patient appears well-developed and well-nourished.   HENT: HEENT exam unremarkable.   Neck: Neck supple. No JVD present. No carotid bruits.   Cardiovascular: Normal rate, regular rhythm and normal heart sounds. 2/6 HATTIE heard.   2+ symmetric pulses.   Pulmonary/Chest: Breath sounds normal. Does not exhibit tenderness. "   Abdominal: Abdomen benign.   Musculoskeletal: Trace edema.   Neurological: Neurological exam unremarkable.   Vitals reviewed.    Data Review:        Procedures       Assessment:      Diagnosis Plan   1. Nonrheumatic aortic valve stenosis  Moderate AS and unchanged from last year per today's echo continue to monitor   2. Syncope and collapse  Single episode in August; no subsequent recurrence, will check 48-hour holter monitor to make sure she is not having any bradycardia or pauses.  In the absence of exertional symptoms do not think this was related to aortic stenosis.   3. Peripheral vascular disease (HCC)  Stable; continue Pletal 50 mg. VENUS 7/2021 showed left 0.78 and right 0.84   4. Essential hypertension  Uncontrolled; begin taking losartan around 12-1pm to spread out medications. Continue carvedilol 25 mg BID, and losartan-HCTZ each morning   5. Dyslipidemia  No new data to review; continue atorvastatin 20 mg and Zetia 10 mg      Plan:   48-hour holter monitor placed on patient today.   Take losartan around noon every day instead of in the morning.  Continue current medications.   FU in 6 MO, sooner as needed.  Thank you for allowing us to participate in the care of your patient.     Scribed for Carolin Green MD by Patrizia Cali. 10/15/2021 13:53 EDT      I, Carolin Green MD, personally performed the services described in this documentation as scribed by the above named individual in my presence, and it is both accurate and complete.  10/15/2021  16:58 EDT        Please note that portions of this note may have been completed with a voice recognition program. Efforts were made to edit the dictations, but occasionally words are mistranscribed.

## 2021-11-04 ENCOUNTER — TELEPHONE (OUTPATIENT)
Dept: CARDIOLOGY | Facility: CLINIC | Age: 86
End: 2021-11-04

## 2021-11-04 NOTE — TELEPHONE ENCOUNTER
Pt daughter calling stating pt having issues w hypotension in the mornings. States BP is sometimes as low as 80/40. HR 70s. States they have recently decreased pt's Coreg to 12.5 mg in AM, 25 mg in PM and BP has improved. I encouraged pt to continue dose for next week, continue monitoring HR/BP 1-2x daily, and call back in readings in 1-2 weeks.

## 2022-06-08 DIAGNOSIS — I70.213 ATHEROSCLEROSIS OF NATIVE ARTERY OF BOTH LOWER EXTREMITIES WITH INTERMITTENT CLAUDICATION: Primary | ICD-10-CM

## 2022-07-14 ENCOUNTER — OFFICE VISIT (OUTPATIENT)
Dept: CARDIAC SURGERY | Facility: CLINIC | Age: 87
End: 2022-07-14

## 2022-07-14 VITALS
HEART RATE: 64 BPM | OXYGEN SATURATION: 98 % | DIASTOLIC BLOOD PRESSURE: 63 MMHG | BODY MASS INDEX: 25.78 KG/M2 | TEMPERATURE: 97.1 F | SYSTOLIC BLOOD PRESSURE: 129 MMHG | HEIGHT: 64 IN | WEIGHT: 151 LBS

## 2022-07-14 DIAGNOSIS — I82.5Y1 CHRONIC DEEP VEIN THROMBOSIS (DVT) OF PROXIMAL VEIN OF RIGHT LOWER EXTREMITY: Primary | ICD-10-CM

## 2022-07-14 DIAGNOSIS — I73.9 PERIPHERAL VASCULAR DISEASE: ICD-10-CM

## 2022-07-14 DIAGNOSIS — R01.1 MURMUR, CARDIAC: ICD-10-CM

## 2022-07-14 DIAGNOSIS — I35.0 NONRHEUMATIC AORTIC VALVE STENOSIS: ICD-10-CM

## 2022-07-14 PROCEDURE — 93923 UPR/LXTR ART STDY 3+ LVLS: CPT | Performed by: THORACIC SURGERY (CARDIOTHORACIC VASCULAR SURGERY)

## 2022-07-14 PROCEDURE — 99213 OFFICE O/P EST LOW 20 MIN: CPT | Performed by: THORACIC SURGERY (CARDIOTHORACIC VASCULAR SURGERY)

## 2022-07-14 NOTE — PROGRESS NOTES
Jaylon Cárdenas MD  Cardiothoracic Surgery  22      PERIPHERAL VASCULAR STUDY        PATIENT:  Patricia Redmond  :  1935    MRN:  [id number]    REQUESTING PHYSICIAN: Jaylon Cárdenas MD          INDICATION: Documented peripheral vascular disease with bilateral claudication                    DESCRIPTION: Standard 4 cuff technique was utilized.  Waveforms have slightly decreased amplitude at all levels and are biphasic.  ABIs are diminished bilaterally.          RIGHT VENUS: PT=0.77    DP=0.75      LEFT VENUS: PT=0.58    DP=0.54          IMPRESSION: Decreased ABIs with decreased PVR amplitude consistent with moderate/severe peripheral vascular occlusive disease.           Electronically signed by Jaylon Cárdenas MD, 22, 10:26 AM EDT.

## 2022-07-14 NOTE — PROGRESS NOTES
07/14/2022  Patient Information  Patricia Fraga Ruy                                                                                          [address] [facility]   [place] [address]   1935  'PCP/Referring Physician'  Emmett Martines MD  No ref. provider found  Chief Complaint   Patient presents with   • Follow-up     1 year follow up for peripheral vascular disease,complains of leg swelling and little burning in her legs sometimes when she walks.       CC: I feel good I am here to have my legs checked    History of Present Illness: 87-year-old  female being seen in follow-up for documented peripheral vascular disease of the lower extremities.  This patient states that she walks frequently but develops a burning sensation in her calves if she walks more than 100 yards.  She states however that she can walk is much as a mile although she has to stop a couple of times.  She does not have rest pain.  She has not had any ulcerations of her feet.  She has a past history of DVT and has a small amount of swelling in her left lower extremity with bilateral varicosities however these things are stable and essentially unchanged over the past year.  The patient is a non-smoker she denies diabetes.  She has hypertension and hyperlipidemia.  ABIs done in the office today are 0.77/0.75 on the right and 0.58/0.54 on the left in comparison to last year this is a slight decrease particularly on the left side.      Patient Active Problem List   Diagnosis   • Hypertension   • Peripheral vascular disease (HCC)   • Dyslipidemia   • Macular degeneration   • Pseudoxanthoma elasticum   • Hypothyroidism   • Osteoporosis   • Deep vein thrombosis (DVT) of right lower extremity (HCC)   • Claudication, class IV (HCC)   • TIA (transient ischemic attack)   • Murmur, cardiac   • Nonrheumatic aortic valve stenosis     Past Medical History:   Diagnosis Date   • Degenerative disc disease, lumbar    • Dyslipidemia    • Hypertension    •  Hypothyroidism    • Inguinal hernia    • Macular degeneration    • Osteoporosis    • Peripheral vascular disease (HCC)    • Pseudoxanthoma elasticum    • TIA (transient ischemic attack)      Past Surgical History:   Procedure Laterality Date   • HYSTERECTOMY     • INGUINAL HERNIA REPAIR Left    • INNER EAR SURGERY Right    • SYMPATHECTOMY     • TONSILLECTOMY         Current Outpatient Medications:   •  atorvastatin (LIPITOR) 20 MG tablet, Take 20 mg by mouth Daily., Disp: , Rfl: 3  •  calcium carbonate-vitamin d 600-400 MG-UNIT per tablet, Take 2 tablets by mouth Daily., Disp: , Rfl:   •  carvedilol (COREG) 25 MG tablet, Take 1 tablet by mouth 2 (Two) Times a Day., Disp: 180 tablet, Rfl: 3  •  cilostazol (PLETAL) 50 MG tablet, Take 1 tablet by mouth 2 (Two) Times a Day Before Meals., Disp: 180 tablet, Rfl: 3  •  ezetimibe (ZETIA) 10 MG tablet, Take 10 mg by mouth Daily., Disp: , Rfl: 2  •  ferrous sulfate 325 (65 FE) MG tablet, Take 650 mg by mouth Every Other Day., Disp: , Rfl:   •  lansoprazole (PREVACID) 30 MG capsule, Take 30 mg by mouth Daily., Disp: , Rfl:   •  levothyroxine (SYNTHROID, LEVOTHROID) 75 MCG tablet, Take 75 mcg by mouth Daily., Disp: , Rfl: 3  •  losartan-hydrochlorothiazide (HYZAAR) 50-12.5 MG per tablet, Take 1 tablet by mouth Daily., Disp: , Rfl:   •  Multiple Vitamin (MULTI-VITAMIN DAILY PO), Take 1 tablet by mouth Daily., Disp: , Rfl:   •  Multiple Vitamins-Minerals (OCUVITE ADULT FORMULA PO), Take 1 tablet by mouth Daily., Disp: , Rfl:   •  vitamin B-12 (CYANOCOBALAMIN) 1000 MCG tablet, Take 2,000 mcg by mouth Daily., Disp: , Rfl:   •  Vitamin D, Cholecalciferol, 1000 units capsule, Take 2,000 Units by mouth Daily., Disp: , Rfl:   •  vitamin E 400 UNIT capsule, Take 400 Units by mouth Daily., Disp: , Rfl:   •  warfarin (COUMADIN) 5 MG tablet, Take 5 mg by mouth Daily. 2.5 mg on Thursday & Sunday, 5mg other days, Disp: , Rfl:   No Known Allergies  Social History     Socioeconomic History   •  Marital status:    • Number of children: 4   Tobacco Use   • Smoking status: Never Smoker   • Smokeless tobacco: Never Used   Substance and Sexual Activity   • Alcohol use: No   • Drug use: No   • Sexual activity: Defer     Family History   Problem Relation Age of Onset   • Hypertension Mother    • Sick sinus syndrome Mother    • Heart failure Mother    • Atrial fibrillation Father    • Valvular heart disease Brother    • Hyperlipidemia Sister    • Hypertension Sister    • Hyperlipidemia Sister    • Hypertension Sister    • Hyperlipidemia Sister    • Hypertension Sister    • Hyperlipidemia Sister    • Hypertension Sister        ROS, past medical history, surgical history, family history, social history and vitals  reviewed by me and corrected as needed.        Review of Systems   Constitutional: Negative. Negative for chills, fever, malaise/fatigue, night sweats and weight loss.   HENT: Negative.  Negative for hearing loss, odynophagia and sore throat.    Cardiovascular: Positive for leg swelling. Negative for chest pain, dyspnea on exertion, orthopnea and palpitations.   Respiratory: Negative.  Negative for cough and hemoptysis.    Endocrine: Negative for cold intolerance, heat intolerance, polydipsia, polyphagia and polyuria.   Hematologic/Lymphatic: Bruises/bleeds easily.   Skin: Negative.  Negative for itching and rash.   Musculoskeletal: Positive for back pain. Negative for joint pain, joint swelling and myalgias.   Gastrointestinal: Negative.  Negative for abdominal pain, constipation, diarrhea, hematemesis, hematochezia, melena, nausea and vomiting.   Genitourinary: Positive for frequency. Negative for dysuria and hematuria.   Neurological: Negative.  Negative for focal weakness, headaches, numbness and seizures.   Psychiatric/Behavioral: Negative.  Negative for suicidal ideas.   All other systems reviewed and are negative.      Vitals:    07/14/22 0928   BP: 129/63   BP Location: Right arm   Patient  "Position: Sitting   Pulse: 64   Temp: 97.1 °F (36.2 °C)   SpO2: 98%   Weight: 68.5 kg (151 lb)   Height: 162.6 cm (64\")        Physical Exam  Vitals and nursing note reviewed.   Constitutional:       General: She is not in acute distress.     Appearance: Normal appearance. She is normal weight.      Comments: Elderly  female in no acute distress.  Patient is in good spirits she is an excellent historian.   HENT:      Head: Normocephalic and atraumatic.      Right Ear: External ear normal.      Left Ear: External ear normal.      Nose: Nose normal.      Mouth/Throat:      Mouth: Mucous membranes are moist.   Eyes:      Extraocular Movements: Extraocular movements intact.      Pupils: Pupils are equal, round, and reactive to light.   Neck:      Vascular: No carotid bruit.   Cardiovascular:      Rate and Rhythm: Normal rate and regular rhythm.      Heart sounds: Murmur heard.      Comments: Dorsalis pedis and posterior tibial pulses are present with the Doppler bilaterally but are not palpable.  Femoral pulses are 1+ bilaterally.  There are no femoral bruits    Grade 2/6 systolic murmur right sternal border radiating toward the neck.  This murmur is also audible over the precordium to the left of the sternum.  The patient has had a previous diagnosis of aortic stenosis and currently follows with cardiology.  She remains asymptomatic from the standpoint of heart disease.  She denies shortness of breath, syncope, and anginal quality chest pain.  Pulmonary:      Effort: Pulmonary effort is normal. No respiratory distress.      Breath sounds: No wheezing, rhonchi or rales.   Abdominal:      General: Abdomen is flat. Bowel sounds are normal.      Palpations: Abdomen is soft. There is no mass.      Tenderness: There is no abdominal tenderness. There is no guarding.   Musculoskeletal:         General: No swelling or tenderness.      Cervical back: Normal range of motion. No rigidity. No muscular tenderness.      " Right lower leg: No edema.      Left lower leg: No edema.   Lymphadenopathy:      Cervical: No cervical adenopathy.   Skin:     General: Skin is warm and dry.      Capillary Refill: Capillary refill takes less than 2 seconds.      Coloration: Skin is not jaundiced.      Findings: No erythema.   Neurological:      General: No focal deficit present.      Mental Status: She is alert and oriented to person, place, and time. Mental status is at baseline.   Psychiatric:         Mood and Affect: Mood normal.         Behavior: Behavior normal.         Thought Content: Thought content normal.         Judgment: Judgment normal.         Labs: None    Imaging: ABIs as noted in the present illness    Assessment: Stable peripheral vascular occlusive disease.  Aortic stenosis (asymptomatic).    Plan: Return to clinic in 1 year with ABIs.    Electronically signed by Jaylon Cárdenas MD, 07/14/22, 10:24 AM EDT.

## 2022-07-15 ENCOUNTER — OFFICE VISIT (OUTPATIENT)
Dept: CARDIOLOGY | Facility: CLINIC | Age: 87
End: 2022-07-15

## 2022-07-15 VITALS
SYSTOLIC BLOOD PRESSURE: 132 MMHG | HEIGHT: 64 IN | HEART RATE: 62 BPM | WEIGHT: 152.6 LBS | OXYGEN SATURATION: 98 % | BODY MASS INDEX: 26.05 KG/M2 | DIASTOLIC BLOOD PRESSURE: 64 MMHG

## 2022-07-15 DIAGNOSIS — I73.9 PERIPHERAL VASCULAR DISEASE: ICD-10-CM

## 2022-07-15 DIAGNOSIS — I35.0 NONRHEUMATIC AORTIC VALVE STENOSIS: Primary | ICD-10-CM

## 2022-07-15 DIAGNOSIS — I10 ESSENTIAL HYPERTENSION: ICD-10-CM

## 2022-07-15 DIAGNOSIS — E78.5 DYSLIPIDEMIA: ICD-10-CM

## 2022-07-15 DIAGNOSIS — R55 SYNCOPE AND COLLAPSE: ICD-10-CM

## 2022-07-15 PROCEDURE — 99214 OFFICE O/P EST MOD 30 MIN: CPT | Performed by: INTERNAL MEDICINE

## 2022-07-15 RX ORDER — CARVEDILOL 25 MG/1
25 TABLET ORAL 2 TIMES DAILY
Qty: 180 TABLET | Refills: 3 | Status: SHIPPED | OUTPATIENT
Start: 2022-07-15

## 2022-07-15 RX ORDER — CILOSTAZOL 50 MG/1
50 TABLET ORAL
Qty: 180 TABLET | Refills: 3 | Status: SHIPPED | OUTPATIENT
Start: 2022-07-15

## 2022-07-15 RX ORDER — AMLODIPINE BESYLATE 2.5 MG/1
2.5 TABLET ORAL DAILY
COMMUNITY

## 2022-07-15 NOTE — PROGRESS NOTES
[facility]    Encounter Date: 07/15/2022    Patient ID: Patricia Redmond is a 87 y.o. female.  : 1935     PCP: Emmett Martines MD       Chief Complaint: Nonrheumatic aortic valve stenosis      PROBLEM LIST:  1. Peripheral Artery Disease:  a. Claudication.  b. CTA lower extremities with contrast, 2018:   1. Right: Right SFA is occluded. Popliteal is reconstituted by collaterals and then occluded distally. Peroneal trunk is occluded. Anterior and posterior tibial are occluded and then reconstituted by collaterals.  2. Left: Left SFA is occluded. Popliteal is occluded. Common peroneal trunk and anterior tibial artery reconstituted by collaterals.  1. VENUS, 2021: Left 0.78 and right 0.84.  2. Aortic valve stenosis:  a. Echo, 2018: EF 56-60%. Left atrial cavity size is mildly dilated. LV diastolic dysfunction (grade I). Moderate AS, mean gradient 21 mmHg. Mild MR/TR. RVSP within normal limits.  b. Echo, 2019: EF 65%. Mild concentric LVH. Impaired relaxation. Moderate AS, CARLTON 1.2 cm2, mean gradient 26 mmHg. Mild MR/TR, normal RVSP.  c. Echo, 2020: EF 60%  Moderate AS, mean gradient 20 mmHg. Mild MR/TR.  d. Echo, 10/15/2021: EF 65%. Moderate AS, mean gradient 26.8 and CARLTON 1.1 cm². Moderate MR. Mild TR with normal RVSP. Mild LVH.   e. Holter, 10/15/2021: Sinus rhythm. Occasional PACs, rare PVCs, and no pauses. Occasional short SVT/PAT. No symptoms.   3. Dizziness:   a. 24h Holter, 2019: Symptoms of SOA occured with sinus rhythm. Short runs of SVT 4-6 beats.  4. Hypertension.  5. Dyslipidemia.  6. DVT x 2 (circa ).  a. On chronic warfarin therapy.  7. TIA.  8. Macular degeneration.  9. Pseudoxanthoma elasticum.  10. Hypothyroidism.  11. Osteoporosis.    History of Present Illness  Patient presents today for a follow-up with a history of nonrheumatic aortic valve stenosis, syncope and collapse, peripheral vascular disease, and cardiac risk  factors. Since last visit, patient has been doing well overall from a cardiovascular standpoint. Patient maintains a stable activity level by walking when she can. She reports improvement of claudication since she started taking Pletal, denies having ulcers on her legs. However, she does have lower extremity edema for which she wears compression stockings. Patient states that she experiences episodes of low blood pressure, but it is not every day. She takes a half tablet of carvedilol at 08:00 AM, then the other half at lunch time, and an additional tablet at night. She takes losartan at lunch time and amlodipine at night. She questions if her low blood pressure is concerning for the condition of her arteries. Patient denies chest pain, shortness of breath, orthopnea, palpitations, dizziness, and syncope.     No Known Allergies      Current Outpatient Medications:   •  amLODIPine (NORVASC) 2.5 MG tablet, Take 2.5 mg by mouth Daily., Disp: , Rfl:   •  atorvastatin (LIPITOR) 20 MG tablet, Take 20 mg by mouth Daily., Disp: , Rfl: 3  •  calcium carbonate-vitamin d 600-400 MG-UNIT per tablet, Take 2 tablets by mouth Daily., Disp: , Rfl:   •  carvedilol (COREG) 25 MG tablet, Take 1 tablet by mouth 2 (Two) Times a Day., Disp: 180 tablet, Rfl: 3  •  cilostazol (PLETAL) 50 MG tablet, Take 1 tablet by mouth 2 (Two) Times a Day Before Meals., Disp: 180 tablet, Rfl: 3  •  ezetimibe (ZETIA) 10 MG tablet, Take 10 mg by mouth Daily., Disp: , Rfl: 2  •  ferrous sulfate 325 (65 FE) MG tablet, Take 650 mg by mouth Every Other Day., Disp: , Rfl:   •  lansoprazole (PREVACID) 30 MG capsule, Take 30 mg by mouth Daily., Disp: , Rfl:   •  levothyroxine (SYNTHROID, LEVOTHROID) 75 MCG tablet, Take 75 mcg by mouth Daily., Disp: , Rfl: 3  •  losartan-hydrochlorothiazide (HYZAAR) 50-12.5 MG per tablet, Take 1 tablet by mouth Daily., Disp: , Rfl:   •  Multiple Vitamin (MULTI-VITAMIN DAILY PO), Take 1 tablet by mouth Daily., Disp: , Rfl:   •   "Multiple Vitamins-Minerals (OCUVITE ADULT FORMULA PO), Take 1 tablet by mouth Daily., Disp: , Rfl:   •  vitamin B-12 (CYANOCOBALAMIN) 1000 MCG tablet, Take 2,000 mcg by mouth Daily., Disp: , Rfl:   •  Vitamin D, Cholecalciferol, 1000 units capsule, Take 2,000 Units by mouth Daily., Disp: , Rfl:   •  vitamin E 400 UNIT capsule, Take 400 Units by mouth Daily., Disp: , Rfl:   •  warfarin (COUMADIN) 5 MG tablet, Take 5 mg by mouth Daily. 2.5 mg on Thursday & Sunday, 5mg other days, Disp: , Rfl:     The following portions of the patient's history were reviewed and updated as appropriate: allergies, current medications, past family history, past medical history, past social history, past surgical history and problem list.    ROS  Review of Systems   Constitution: Negative for chills, fever, fatigue, generalized weakness.   Cardiovascular: Negative for chest pain, dyspnea on exertion, leg swelling, palpitations, orthopnea, and syncope.   Respiratory: Negative for cough, shortness of breath, and wheezing.  HENT: Negative for ear pain, nosebleeds, and tinnitus.   Gastrointestinal: Negative for abdominal pain, constipation, diarrhea, nausea and vomiting.   Genitourinary: No urinary symptoms.  Musculoskeletal: Negative for muscle cramps.  Neurological: Negative for dizziness, headaches, loss of balance, numbness, and symptoms of stroke.  Psychiatric: Normal mental status.     All other systems reviewed and are negative.        Objective:     /64 (BP Location: Right arm, Patient Position: Sitting, Cuff Size: Adult)   Pulse 62   Ht 162.6 cm (64\")   Wt 69.2 kg (152 lb 9.6 oz)   SpO2 98%   BMI 26.19 kg/m²      Physical Exam  Constitutional: Patient appears well-developed and well-nourished.   HENT: HEENT exam unremarkable.   Neck: Neck supple. No JVD present. No carotid bruits.   Cardiovascular: Normal rate, regular rhythm and normal heart sounds. 2/6 systolic murmur.  2+ symmetric pulses.   Pulmonary/Chest: Breath " sounds normal. Does not exhibit tenderness.   Abdominal: Abdomen benign.   Musculoskeletal: Trace edema.   Neurological: Neurological exam unremarkable.   Vitals reviewed.    Data Review:     No recent laboratory studies available for review today.       Procedures           Assessment:      Diagnosis Plan   1. Nonrheumatic aortic valve stenosis  Echocardiogram to be ordered in 6 months to really assess aortic stenosis, continue current management.     2. Syncope and collapse  Stable and asymptomatic.  No recurrence since initial episode.   3. Peripheral vascular disease (HCC)  Stable.  Claudications improved on Pletal, continue same dose.   4. Essential hypertension  If patient is experiencing low blood pressure in the evening <120 mmHg, skip dose of amlodipine 2.5 mg. On amlodipine 2.5 mg daily, carvedilol 25 mg BID, and losartan-HCTZ.   5. Dyslipidemia  No labs to review in visit. Follow up with PCP for routine blood tests. On atorvastatin 20 mg daily and Zetia 10 mg daily.       Plan:   Stable cardiac status.  In case her blood pressure in the evening <120 mmHg, skip dose of amlodipine 2.5 mg.   Follow up with PCP for routine blood tests.   Echocardiogram to be ordered in 6 months to reassess aortic stenosis, currently she is stable/asymptomatic.    Continue current medications.   FU in 6 MO for previously scheduled appointment, sooner as needed.  Thank you for allowing us to participate in the care of your patient.     Scribed for Carolin Green MD by Zainab Michael. 7/15/2022 09:59 EDT    I, Carolin Green MD, personally performed the services described in this documentation as scribed by the above named individual in my presence, and it is both accurate and complete.  7/15/2022  10:40 EDT        Please note that portions of this note may have been completed with a voice recognition program. Efforts were made to edit the dictations, but occasionally words are mistranscribed.

## 2022-08-01 ENCOUNTER — TELEPHONE (OUTPATIENT)
Dept: CARDIAC SURGERY | Facility: CLINIC | Age: 87
End: 2022-08-01

## 2022-08-01 NOTE — TELEPHONE ENCOUNTER
Caller: NAM BARRIOS     Relationship to patient: DAUGHTER      Best call back number: [phone]    Chief complaint: WANTS TO SEE DR POLLARD     Type of visit: FOLLOW UP    Requested date: SAME AS THE ORIGINAL APPOINTMENT    If rescheduling, when is the original appointment: 7/13/2023    Additional notes: THE DAUGHTER WOULD LIKE FOR THE PATIENT TO SEE DR HOUGH AND NOT MARY DENIS. THANK SO MUCH!

## 2022-12-02 ENCOUNTER — OFFICE VISIT (OUTPATIENT)
Dept: CARDIOLOGY | Facility: CLINIC | Age: 87
End: 2022-12-02

## 2022-12-02 ENCOUNTER — HOSPITAL ENCOUNTER (OUTPATIENT)
Dept: CARDIOLOGY | Facility: HOSPITAL | Age: 87
Discharge: HOME | End: 2022-12-02
Admitting: INTERNAL MEDICINE

## 2022-12-02 VITALS
HEART RATE: 73 BPM | HEIGHT: 64 IN | OXYGEN SATURATION: 97 % | BODY MASS INDEX: 25.95 KG/M2 | SYSTOLIC BLOOD PRESSURE: 134 MMHG | WEIGHT: 152 LBS | DIASTOLIC BLOOD PRESSURE: 62 MMHG

## 2022-12-02 DIAGNOSIS — R55 SYNCOPE AND COLLAPSE: ICD-10-CM

## 2022-12-02 DIAGNOSIS — I10 ESSENTIAL HYPERTENSION: ICD-10-CM

## 2022-12-02 DIAGNOSIS — I73.9 PERIPHERAL VASCULAR DISEASE: ICD-10-CM

## 2022-12-02 DIAGNOSIS — I35.0 NONRHEUMATIC AORTIC VALVE STENOSIS: Primary | ICD-10-CM

## 2022-12-02 DIAGNOSIS — E78.5 DYSLIPIDEMIA: ICD-10-CM

## 2022-12-02 DIAGNOSIS — I35.0 NONRHEUMATIC AORTIC VALVE STENOSIS: ICD-10-CM

## 2022-12-02 LAB
BH CV ECHO MEAS - AO MAX PG: 52 MMHG
BH CV ECHO MEAS - AO MEAN PG: 29 MMHG
BH CV ECHO MEAS - AO ROOT DIAM: 3 CM
BH CV ECHO MEAS - AO V2 MAX: 362 CM/SEC
BH CV ECHO MEAS - AO V2 VTI: 91.6 CM
BH CV ECHO MEAS - AVA(I,D): 1.25 CM2
BH CV ECHO MEAS - EDV(CUBED): 54.9 ML
BH CV ECHO MEAS - EDV(MOD-SP2): 71.8 ML
BH CV ECHO MEAS - EDV(MOD-SP4): 74.5 ML
BH CV ECHO MEAS - EF(MOD-BP): 66.6 %
BH CV ECHO MEAS - EF(MOD-SP2): 68.4 %
BH CV ECHO MEAS - EF(MOD-SP4): 58.3 %
BH CV ECHO MEAS - ESV(CUBED): 17.6 ML
BH CV ECHO MEAS - ESV(MOD-SP2): 22.7 ML
BH CV ECHO MEAS - ESV(MOD-SP4): 31.1 ML
BH CV ECHO MEAS - FS: 31.6 %
BH CV ECHO MEAS - IVS/LVPW: 1 CM
BH CV ECHO MEAS - IVSD: 1.4 CM
BH CV ECHO MEAS - LA DIMENSION: 3.9 CM
BH CV ECHO MEAS - LAT PEAK E' VEL: 8.2 CM/SEC
BH CV ECHO MEAS - LV MASS(C)D: 194.1 GRAMS
BH CV ECHO MEAS - LV MAX PG: 6.7 MMHG
BH CV ECHO MEAS - LV MEAN PG: 4 MMHG
BH CV ECHO MEAS - LV V1 MAX: 129 CM/SEC
BH CV ECHO MEAS - LV V1 VTI: 36.4 CM
BH CV ECHO MEAS - LVIDD: 3.8 CM
BH CV ECHO MEAS - LVIDS: 2.6 CM
BH CV ECHO MEAS - LVOT AREA: 3.1 CM2
BH CV ECHO MEAS - LVOT DIAM: 2 CM
BH CV ECHO MEAS - LVPWD: 1.4 CM
BH CV ECHO MEAS - MED PEAK E' VEL: 9.6 CM/SEC
BH CV ECHO MEAS - MV A MAX VEL: 113 CM/SEC
BH CV ECHO MEAS - MV DEC SLOPE: 651 CM/SEC2
BH CV ECHO MEAS - MV DEC TIME: 0.24 MSEC
BH CV ECHO MEAS - MV E MAX VEL: 138 CM/SEC
BH CV ECHO MEAS - MV E/A: 1.22
BH CV ECHO MEAS - MV MAX PG: 11.3 MMHG
BH CV ECHO MEAS - MV MEAN PG: 5.3 MMHG
BH CV ECHO MEAS - MV P1/2T: 64.3 MSEC
BH CV ECHO MEAS - MV V2 VTI: 40.7 CM
BH CV ECHO MEAS - MVA(P1/2T): 3.4 CM2
BH CV ECHO MEAS - MVA(VTI): 2.8 CM2
BH CV ECHO MEAS - PA ACC TIME: 0.17 SEC
BH CV ECHO MEAS - PA PR(ACCEL): 1.15 MMHG
BH CV ECHO MEAS - PA V2 MAX: 183 CM/SEC
BH CV ECHO MEAS - RAP SYSTOLE: 8 MMHG
BH CV ECHO MEAS - RVSP: 44 MMHG
BH CV ECHO MEAS - SV(LVOT): 114.4 ML
BH CV ECHO MEAS - SV(MOD-SP2): 49.1 ML
BH CV ECHO MEAS - SV(MOD-SP4): 43.4 ML
BH CV ECHO MEAS - TAPSE (>1.6): 2 CM
BH CV ECHO MEAS - TR MAX PG: 36 MMHG
BH CV ECHO MEAS - TR MAX VEL: 300 CM/SEC
BH CV ECHO MEASUREMENTS AVERAGE E/E' RATIO: 15.51
BH CV VAS BP LEFT ARM: NORMAL MMHG
BH CV XLRA - RV BASE: 3 CM
BH CV XLRA - RV LENGTH: 5.2 CM
BH CV XLRA - RV MID: 2.1 CM
BH CV XLRA - TDI S': 13.1 CM/SEC
LEFT ATRIUM VOLUME INDEX: 48.7 ML/M2
LV EF 2D ECHO EST: 65 %
MAXIMAL PREDICTED HEART RATE: 134 BPM
STRESS TARGET HR: 114 BPM

## 2022-12-02 PROCEDURE — 93306 TTE W/DOPPLER COMPLETE: CPT | Performed by: INTERNAL MEDICINE

## 2022-12-02 PROCEDURE — 93306 TTE W/DOPPLER COMPLETE: CPT

## 2022-12-02 PROCEDURE — 99214 OFFICE O/P EST MOD 30 MIN: CPT

## 2022-12-16 ENCOUNTER — APPOINTMENT (OUTPATIENT)
Dept: CARDIOLOGY | Facility: HOSPITAL | Age: 87
End: 2022-12-16

## 2023-02-10 ENCOUNTER — APPOINTMENT (OUTPATIENT)
Dept: CARDIOLOGY | Facility: HOSPITAL | Age: 88
End: 2023-02-10

## 2023-04-25 ENCOUNTER — TELEPHONE (OUTPATIENT)
Dept: CARDIAC SURGERY | Facility: CLINIC | Age: 88
End: 2023-04-25
Payer: MEDICARE

## 2023-04-25 NOTE — TELEPHONE ENCOUNTER
Daughter called requesting to see Dr. Cárdenas for pain across the top of the right foot and ankle area, no more swelling or redness than usual.  I explained that Dr. Cárdenas will be out of the office for a couple of months and I could schedule or talk to one of his APRN's, but since the problem confined to the foot and ankle area, may be beneficial to see her local PCP first to be evaluated and if they feel it is vascular related we can schedule earlier appointment with our office. She will talk to her mother and give us a call back.

## 2023-05-02 NOTE — PROGRESS NOTES
"     [facility] Cardiothoracic Surgery Office Follow Up Note     Date of Encounter: 2023     Name: Patricia Redmond  : 1935     Referred By: No ref. provider found  PCP: Emmett Martines MD    Chief Complaint:    Chief Complaint   Patient presents with   • Follow-up     Patient Requested Sooner Appt for Right Foot and Ankle Pain       Subjective      History of Present Illness:    Patricia Redmond is a 88 y.o. female non-smoker followed by Dr. Cárdenas for PVD.  Last seen in the clinic 2022 for annual PVD surveillance.  VENUS at that time abnormal but stable.  No reported wounds or claudication at that time.  Family recently called the office to report patient c/o pain across the top of the right foot and ankle area.  She comes in for follow-up exam today.  PMH: PVD, moderate aortic stenosis, HTN, HLD, DVT on chronic warfarin therapy, TIA, macular degeneration, hypothyroid, osteoporosis.    Patient accompanied by daughter.  Patient states she recently visited family in [place] during which time she walked daily without pain or limitations.  Patient states pain initially began during the overnight hours 2023.  She had walked approximately 1 mile during the daytime hours with her daughter.  She states she began to feel sharp pain along the lateral aspect of the right foot and along the dorsal surface of the right foot and lower leg.  She describes these pains as \"stabbing\" and and relieved by Tylenol.  Chronic mild lower extremity edema seems worse over the past 7 to 10 days than baseline although she continues to wear daily light compression knee-high stockings.  Patient presented to [facility] ER 2023 to evaluate the pain.  These records are reviewed.  X-ray revealed no fracture or acute findings.  Labs benign/ uric acid within range, CRP 8.2 mg/L, and CBC WNL.      Review of Systems:  Review of Systems   Constitutional: Positive for malaise/fatigue. Negative for chills, decreased appetite, " diaphoresis, fever, night sweats, weight gain and weight loss.   HENT: Negative for hoarse voice.    Eyes: Negative for blurred vision, double vision and visual disturbance.   Cardiovascular: Positive for leg swelling (right leg). Negative for chest pain, claudication, dyspnea on exertion, irregular heartbeat, near-syncope, orthopnea, palpitations, paroxysmal nocturnal dyspnea and syncope.   Respiratory: Negative for cough, hemoptysis, shortness of breath, sputum production and wheezing.    Hematologic/Lymphatic: Negative for adenopathy and bleeding problem. Bruises/bleeds easily.   Skin: Negative for color change, nail changes, poor wound healing and rash.   Musculoskeletal: Negative for back pain, falls and muscle cramps.   Gastrointestinal: Negative for abdominal pain, dysphagia and heartburn.   Genitourinary: Negative for flank pain.   Neurological: Negative for brief paralysis, disturbances in coordination, dizziness, focal weakness, headaches, light-headedness, loss of balance, numbness, paresthesias, sensory change, vertigo and weakness.   Psychiatric/Behavioral: Negative for depression and suicidal ideas.   Allergic/Immunologic: Negative for persistent infections.       I have reviewed the following portions of the patient's history: allergies, current medications, past family history, past medical history, past social history, past surgical history, problem list and ROS and confirm it's accurate.    Allergies:  No Known Allergies    Medications:      Current Outpatient Medications:   •  amLODIPine (NORVASC) 2.5 MG tablet, Take 1 tablet by mouth Daily., Disp: , Rfl:   •  atorvastatin (LIPITOR) 20 MG tablet, Take 1 tablet by mouth Daily., Disp: , Rfl: 3  •  calcium carbonate-vitamin d 600-400 MG-UNIT per tablet, Take 2 tablets by mouth Daily., Disp: , Rfl:   •  carvedilol (COREG) 25 MG tablet, Take 1 tablet by mouth 2 (Two) Times a Day., Disp: 180 tablet, Rfl: 3  •  cilostazol (PLETAL) 50 MG tablet, Take 1  tablet by mouth 2 (Two) Times a Day Before Meals., Disp: 180 tablet, Rfl: 3  •  ezetimibe (ZETIA) 10 MG tablet, Take 1 tablet by mouth Daily., Disp: , Rfl: 2  •  ferrous sulfate 325 (65 FE) MG tablet, Take 1 tablet by mouth Daily. 1 tablet Daily, Disp: , Rfl:   •  lansoprazole (PREVACID) 30 MG capsule, Take 1 capsule by mouth Daily., Disp: , Rfl:   •  levothyroxine (SYNTHROID, LEVOTHROID) 75 MCG tablet, Take 1 tablet by mouth Daily., Disp: , Rfl: 3  •  losartan-hydrochlorothiazide (HYZAAR) 50-12.5 MG per tablet, Take 1 tablet by mouth Daily., Disp: , Rfl:   •  Multiple Vitamin (MULTI-VITAMIN DAILY PO), Take 1 tablet by mouth Daily., Disp: , Rfl:   •  Multiple Vitamins-Minerals (OCUVITE ADULT FORMULA PO), Take 1 tablet by mouth Daily., Disp: , Rfl:   •  vitamin B-12 (CYANOCOBALAMIN) 1000 MCG tablet, Take 2 tablets by mouth Daily., Disp: , Rfl:   •  Vitamin D, Cholecalciferol, 1000 units capsule, Take 2 capsules by mouth Daily., Disp: , Rfl:   •  vitamin E 400 UNIT capsule, Take 1 capsule by mouth Daily., Disp: , Rfl:   •  warfarin (COUMADIN) 5 MG tablet, Take 1 tablet by mouth Daily. 5 mg every day and then on Tuesday, Thursday, Sunday takes a total of 7.5 mg, Disp: , Rfl:     History:   Past Medical History:   Diagnosis Date   • Degenerative disc disease, lumbar    • Dyslipidemia    • Hypertension    • Hypothyroidism    • Inguinal hernia    • Macular degeneration    • Osteoporosis    • Peripheral vascular disease    • Pseudoxanthoma elasticum    • TIA (transient ischemic attack)        Past Surgical History:   Procedure Laterality Date   • HYSTERECTOMY     • INGUINAL HERNIA REPAIR Left    • INNER EAR SURGERY Right    • SYMPATHECTOMY     • TONSILLECTOMY         Social History     Socioeconomic History   • Marital status:    • Number of children: 4   Tobacco Use   • Smoking status: Never   • Smokeless tobacco: Never   Vaping Use   • Vaping Use: Never used   Substance and Sexual Activity   • Alcohol use: No   •  "Drug use: No   • Sexual activity: Defer        Family History   Problem Relation Age of Onset   • Hypertension Mother    • Sick sinus syndrome Mother    • Heart failure Mother    • Atrial fibrillation Father    • Hyperlipidemia Sister    • Hypertension Sister    • Hyperlipidemia Sister    • Hypertension Sister    • Hyperlipidemia Sister    • Hypertension Sister    • Hyperlipidemia Sister    • Hypertension Sister    • Valvular heart disease Brother        Objective   Physical Exam:  Vitals:    05/03/23 1137 05/03/23 1138   BP: 150/80 158/80   BP Location: Left arm Right arm   Patient Position: Sitting Sitting   Pulse: 70    Temp: 97.1 °F (36.2 °C)    SpO2: 98%    Weight: 68 kg (150 lb)    Height: 160 cm (63\")  Comment: patient reported       Body mass index is 26.57 kg/m².    Physical Exam  Vitals reviewed.   Constitutional:       General: She is not in acute distress.     Appearance: She is not toxic-appearing.   HENT:      Head: Normocephalic and atraumatic.   Eyes:      General: Lids are normal.      Conjunctiva/sclera: Conjunctivae normal.      Pupils: Pupils are equal, round, and reactive to light.   Cardiovascular:      Rate and Rhythm: Normal rate and regular rhythm.      Pulses:           Popliteal pulses are 1+ on the right side and 1+ on the left side.        Dorsalis pedis pulses are 1+ on the right side and 1+ on the left side.        Posterior tibial pulses are 1+ on the right side and 1+ on the left side.      Heart sounds: S1 normal and S2 normal. Murmur heard.    Systolic murmur is present with a grade of 3/6.     Comments: Trace bilateral pedal/ lower extremity edema.  Pedal pulses palpable and verified by doppler.  Pulmonary:      Effort: Pulmonary effort is normal. No respiratory distress.      Breath sounds: Normal breath sounds.   Musculoskeletal:         General: Normal range of motion.      Cervical back: Normal range of motion and neck supple.   Feet:      Right foot:      Skin integrity: Skin " integrity normal.      Left foot:      Skin integrity: Skin integrity normal.   Skin:     General: Skin is warm and dry.      Capillary Refill: Capillary refill takes less than 2 seconds.   Neurological:      General: No focal deficit present.      Mental Status: She is alert and oriented to person, place, and time.   Psychiatric:         Attention and Perception: Attention normal.         Mood and Affect: Mood normal.         Speech: Speech normal.         Behavior: Behavior normal. Behavior is cooperative.         Imaging/Labs:  VENUS right ankle PT 0.86, right ankle DP 0.86.  VENUS left ankle PT 0.81, left ankle DP 0.77    PERIPHERAL VASCULAR STUDY   REQUESTING PHYSICIAN: Jaylon Cárdenas MD       INDICATION: Documented peripheral vascular disease with bilateral claudication                  DESCRIPTION: Standard 4 cuff technique was utilized.  Waveforms have slightly decreased amplitude at all levels and are biphasic.  ABIs are diminished bilaterally.       RIGHT VENUS:    PT=0.77                          DP=0.75        LEFT VENUS:       PT=0.58                          DP=0.54     IMPRESSION: Decreased ABIs with decreased PVR amplitude consistent with moderate/severe peripheral vascular occlusive disease.                                          Electronically signed by Jaylon Cárdenas MD, 07/14/22, 10:26 AM EDT.      Assessment / Plan      Assessment / Plan:  1. Peripheral vascular disease (Primary)  2. Leg pain w/ history of DVT  - 88 y.o. female non-smoker followed by Dr. Cárdenas for PVD.    - VENUS 7/2022 abnormal but stable.    - Family recently called the office to report patient c/o pain across the top of the right foot and ankle area.  She comes in for follow-up exam today.    - PMH: PVD, moderate aortic stenosis, HTN, HLD, DVT (remote +/- 2002) on chronic warfarin therapy, TIA, macular degeneration, hypothyroid, osteoporosis.  - Recent travel to [facility] visiting family  - Patient accompanied by daughter.    - Patient  "states pain initially began during the overnight hours 4/23/2023.  She had walked approximately 1 mile during the daytime hours with her daughter.  She states she began to feel sharp pain along the lateral aspect of the right foot and along the dorsal surface of the right foot and lower leg.  She describes these pains as \"stabbing\" and and unrelieved by Tylenol.    - Chronic mild lower extremity edema seems worse over the past 7 to 10 days than baseline although she continues to wear daily light compression knee-high stockings.    - Patient presented to [facility]  ER 4/25/2023 to evaluate the pain: Records are reviewed as follows  X-ray revealed no fracture or acute findings.  Labs benign/ uric acid within range, CRP 8.2 mg/L, and CBC WNL.    - Exam today shows palpable pedal pulses, stable VENUS, and trace bilateral LE edema  - Current medical management: warfarin, Pletal, atorvastatin  - Follows with Cardiology re: moderate aortic stenosis  - Remote DVT on chronic warfarin therapy  - Venous duplex bilateral LE: Will update daughter results by phone later this week      Follow Up:   Return in about 2 months (around 7/13/2023) for PVD follow up.   Or sooner for any further concerns or worsening sign and symptoms. If unable to reach us in the office please dial 911 or go to the nearest emergency department.      JOSEPH Shine  [facility] Cardiothoracic Surgery    Time Spent: I spent 39 minutes caring for Patricia on this date of service. This time includes time spent by me in the following activities: preparing for the visit, reviewing tests, obtaining and/or reviewing a separately obtained history, performing a medically appropriate examination and/or evaluation, counseling and educating the patient/family/caregiver, ordering medications, tests, or procedures, documenting information in the medical record, independently interpreting results and communicating that information with the patient/family/caregiver and care " coordination.

## 2023-05-03 ENCOUNTER — OFFICE VISIT (OUTPATIENT)
Dept: CARDIAC SURGERY | Facility: CLINIC | Age: 88
End: 2023-05-03
Payer: MEDICARE

## 2023-05-03 ENCOUNTER — HOSPITAL ENCOUNTER (OUTPATIENT)
Dept: CARDIOLOGY | Facility: HOSPITAL | Age: 88
Discharge: HOME | End: 2023-05-03
Admitting: NURSE PRACTITIONER
Payer: MEDICARE

## 2023-05-03 VITALS
SYSTOLIC BLOOD PRESSURE: 158 MMHG | WEIGHT: 150 LBS | HEIGHT: 63 IN | OXYGEN SATURATION: 98 % | TEMPERATURE: 97.1 F | HEART RATE: 70 BPM | BODY MASS INDEX: 26.58 KG/M2 | DIASTOLIC BLOOD PRESSURE: 80 MMHG

## 2023-05-03 VITALS — HEIGHT: 63 IN | WEIGHT: 150 LBS | BODY MASS INDEX: 26.58 KG/M2

## 2023-05-03 DIAGNOSIS — M79.604 PAIN IN BOTH LOWER EXTREMITIES: Primary | ICD-10-CM

## 2023-05-03 DIAGNOSIS — M79.605 PAIN IN BOTH LOWER EXTREMITIES: ICD-10-CM

## 2023-05-03 DIAGNOSIS — I73.9 PERIPHERAL VASCULAR DISEASE: ICD-10-CM

## 2023-05-03 DIAGNOSIS — I73.9 PERIPHERAL VASCULAR DISEASE: Primary | ICD-10-CM

## 2023-05-03 DIAGNOSIS — M79.604 PAIN IN BOTH LOWER EXTREMITIES: ICD-10-CM

## 2023-05-03 DIAGNOSIS — I82.4Y1 DEEP VEIN THROMBOSIS (DVT) OF PROXIMAL VEIN OF RIGHT LOWER EXTREMITY, UNSPECIFIED CHRONICITY: ICD-10-CM

## 2023-05-03 DIAGNOSIS — M79.605 PAIN IN BOTH LOWER EXTREMITIES: Primary | ICD-10-CM

## 2023-05-03 PROCEDURE — 93970 EXTREMITY STUDY: CPT

## 2023-05-04 LAB
BH CV LOW VAS LEFT DISTAL FEMORAL SPONT: 1
BH CV LOW VAS LEFT GASTRONEMIUS VESSEL: 1
BH CV LOW VAS RIGHT GASTRONEMIUS VESSEL: 1
BH CV LOW VAS RIGHT LESSER SAPH VESSEL: 1
BH CV LOWER VASCULAR LEFT COMMON FEMORAL AUGMENT: NORMAL
BH CV LOWER VASCULAR LEFT COMMON FEMORAL COMPRESS: NORMAL
BH CV LOWER VASCULAR LEFT COMMON FEMORAL PHASIC: NORMAL
BH CV LOWER VASCULAR LEFT COMMON FEMORAL SPONT: NORMAL
BH CV LOWER VASCULAR LEFT DISTAL FEMORAL COMPRESS: NORMAL
BH CV LOWER VASCULAR LEFT DISTAL FEMORAL THROMBUS: NORMAL
BH CV LOWER VASCULAR LEFT GASTRONEMIUS COMPRESS: NORMAL
BH CV LOWER VASCULAR LEFT GASTRONEMIUS THROMBUS: NORMAL
BH CV LOWER VASCULAR LEFT GREATER SAPH AK COMPRESS: NORMAL
BH CV LOWER VASCULAR LEFT GREATER SAPH BK COMPRESS: NORMAL
BH CV LOWER VASCULAR LEFT LESSER SAPH COMPRESS: NORMAL
BH CV LOWER VASCULAR LEFT MID FEMORAL AUGMENT: NORMAL
BH CV LOWER VASCULAR LEFT MID FEMORAL COMPRESS: NORMAL
BH CV LOWER VASCULAR LEFT MID FEMORAL PHASIC: NORMAL
BH CV LOWER VASCULAR LEFT MID FEMORAL SPONT: NORMAL
BH CV LOWER VASCULAR LEFT PERONEAL COMPRESS: NORMAL
BH CV LOWER VASCULAR LEFT POPLITEAL AUGMENT: NORMAL
BH CV LOWER VASCULAR LEFT POPLITEAL COMPRESS: NORMAL
BH CV LOWER VASCULAR LEFT POPLITEAL PHASIC: NORMAL
BH CV LOWER VASCULAR LEFT POPLITEAL SPONT: NORMAL
BH CV LOWER VASCULAR LEFT POSTERIOR TIBIAL COMPRESS: NORMAL
BH CV LOWER VASCULAR LEFT PROFUNDA FEMORAL AUGMENT: NORMAL
BH CV LOWER VASCULAR LEFT PROFUNDA FEMORAL COMPRESS: NORMAL
BH CV LOWER VASCULAR LEFT PROFUNDA FEMORAL PHASIC: NORMAL
BH CV LOWER VASCULAR LEFT PROFUNDA FEMORAL SPONT: NORMAL
BH CV LOWER VASCULAR LEFT PROXIMAL FEMORAL AUGMENT: NORMAL
BH CV LOWER VASCULAR LEFT PROXIMAL FEMORAL COMPRESS: NORMAL
BH CV LOWER VASCULAR LEFT PROXIMAL FEMORAL PHASIC: NORMAL
BH CV LOWER VASCULAR LEFT PROXIMAL FEMORAL SPONT: NORMAL
BH CV LOWER VASCULAR LEFT SAPHENOFEMORAL JUNCTION AUGMENT: NORMAL
BH CV LOWER VASCULAR LEFT SAPHENOFEMORAL JUNCTION COMPRESS: NORMAL
BH CV LOWER VASCULAR LEFT SAPHENOFEMORAL JUNCTION PHASIC: NORMAL
BH CV LOWER VASCULAR LEFT SAPHENOFEMORAL JUNCTION SPONT: NORMAL
BH CV LOWER VASCULAR RIGHT COMMON FEMORAL AUGMENT: NORMAL
BH CV LOWER VASCULAR RIGHT COMMON FEMORAL COMPRESS: NORMAL
BH CV LOWER VASCULAR RIGHT COMMON FEMORAL PHASIC: NORMAL
BH CV LOWER VASCULAR RIGHT COMMON FEMORAL SPONT: NORMAL
BH CV LOWER VASCULAR RIGHT DISTAL FEMORAL AUGMENT: NORMAL
BH CV LOWER VASCULAR RIGHT DISTAL FEMORAL COMPRESS: NORMAL
BH CV LOWER VASCULAR RIGHT DISTAL FEMORAL PHASIC: NORMAL
BH CV LOWER VASCULAR RIGHT DISTAL FEMORAL SPONT: NORMAL
BH CV LOWER VASCULAR RIGHT GASTRONEMIUS COMPRESS: NORMAL
BH CV LOWER VASCULAR RIGHT GASTRONEMIUS THROMBUS: NORMAL
BH CV LOWER VASCULAR RIGHT GREATER SAPH AK COMPRESS: NORMAL
BH CV LOWER VASCULAR RIGHT GREATER SAPH BK COMPRESS: NORMAL
BH CV LOWER VASCULAR RIGHT LESSER SAPH COMPRESS: NORMAL
BH CV LOWER VASCULAR RIGHT LESSER SAPH THROMBUS: NORMAL
BH CV LOWER VASCULAR RIGHT MID FEMORAL AUGMENT: NORMAL
BH CV LOWER VASCULAR RIGHT MID FEMORAL COMPRESS: NORMAL
BH CV LOWER VASCULAR RIGHT MID FEMORAL PHASIC: NORMAL
BH CV LOWER VASCULAR RIGHT MID FEMORAL SPONT: NORMAL
BH CV LOWER VASCULAR RIGHT PERONEAL COMPRESS: NORMAL
BH CV LOWER VASCULAR RIGHT POPLITEAL AUGMENT: NORMAL
BH CV LOWER VASCULAR RIGHT POPLITEAL COMPETENT: NORMAL
BH CV LOWER VASCULAR RIGHT POPLITEAL COMPRESS: NORMAL
BH CV LOWER VASCULAR RIGHT POPLITEAL PHASIC: NORMAL
BH CV LOWER VASCULAR RIGHT POPLITEAL SPONT: NORMAL
BH CV LOWER VASCULAR RIGHT POSTERIOR TIBIAL COMPRESS: NORMAL
BH CV LOWER VASCULAR RIGHT PROFUNDA FEMORAL AUGMENT: NORMAL
BH CV LOWER VASCULAR RIGHT PROFUNDA FEMORAL COMPRESS: NORMAL
BH CV LOWER VASCULAR RIGHT PROFUNDA FEMORAL PHASIC: NORMAL
BH CV LOWER VASCULAR RIGHT PROFUNDA FEMORAL SPONT: NORMAL
BH CV LOWER VASCULAR RIGHT PROXIMAL FEMORAL AUGMENT: NORMAL
BH CV LOWER VASCULAR RIGHT PROXIMAL FEMORAL COMPRESS: NORMAL
BH CV LOWER VASCULAR RIGHT PROXIMAL FEMORAL PHASIC: NORMAL
BH CV LOWER VASCULAR RIGHT PROXIMAL FEMORAL SPONT: NORMAL
BH CV LOWER VASCULAR RIGHT SAPHENOFEMORAL JUNCTION AUGMENT: NORMAL
BH CV LOWER VASCULAR RIGHT SAPHENOFEMORAL JUNCTION COMPRESS: NORMAL
BH CV LOWER VASCULAR RIGHT SAPHENOFEMORAL JUNCTION PHASIC: NORMAL
BH CV LOWER VASCULAR RIGHT SAPHENOFEMORAL JUNCTION SPONT: NORMAL
BH CV VAS PRELIMINARY FINDINGS SCRIPTING: 1
MAXIMAL PREDICTED HEART RATE: 133 BPM
STRESS TARGET HR: 113 BPM

## 2023-05-05 ENCOUNTER — TELEPHONE (OUTPATIENT)
Dept: CARDIAC SURGERY | Facility: CLINIC | Age: 88
End: 2023-05-05
Payer: MEDICARE

## 2023-05-05 NOTE — TELEPHONE ENCOUNTER
Telephone follow-up regarding bilateral lower extremity venous duplex 5/3/23 results.  Spoke with patient's daughter (Jason).    Reviewed interpretation summary as follows: Chronic right lower extremity DVT in the gastrocnemius, chronic right lower extremity superficial thrombophlebitis in the small saphenous, chronic left lower extremity deep vein thrombosis noted in gastrocnemius, subacute left lower extremity deep vein thrombosis and distal femoral.  Deep vascular incompetence noted in right popliteal.    Recommend follow-up with PCP next week to confirm PT/INR is therapeutic.  Recent increase in bilateral lower extremity edema and pain most likely related to acute on chronic venous thrombus disease/potentially related to recent travel.  No changes in plan are necessary.  It is only necessary to keep close check on therapeutic INR for the next several weeks.  Continue to wear compression socks daily.  Okay to ambulate as tolerated daily.    Patient's daughter states she will plan to keep PVD check up scheduled for July 2023 but will make arrangements for Mrs. Redmond to see PCP within the next 7-10 days..    Dana RUIZ

## 2023-11-14 ENCOUNTER — TELEPHONE (OUTPATIENT)
Dept: CARDIOLOGY | Facility: CLINIC | Age: 88
End: 2023-11-14

## 2023-11-14 DIAGNOSIS — E78.5 DYSLIPIDEMIA: ICD-10-CM

## 2023-11-14 DIAGNOSIS — I35.0 NONRHEUMATIC AORTIC VALVE STENOSIS: Primary | ICD-10-CM

## 2023-11-14 DIAGNOSIS — R01.1 MURMUR, CARDIAC: ICD-10-CM

## 2023-11-14 NOTE — TELEPHONE ENCOUNTER
Caller: Patricia Redmond Flakito    Relationship: Self    Best call back number: [phone]     What is the best time to reach you: ANY     Who are you requesting to speak with (clinical staff, provider,  specific staff member): CLINICAL     What was the call regarding: PT'S  DAUGHTER STATES THE PT IS NEEDING AN ECHO APPT SCHEDULED. PLEASE REACH OUT TO FURTHER ADVISE.      Is it okay if the provider responds through [employer]: NO

## 2023-12-08 ENCOUNTER — OFFICE VISIT (OUTPATIENT)
Dept: CARDIOLOGY | Facility: CLINIC | Age: 88
End: 2023-12-08
Payer: MEDICARE

## 2023-12-08 ENCOUNTER — HOSPITAL ENCOUNTER (OUTPATIENT)
Dept: CARDIOLOGY | Facility: HOSPITAL | Age: 88
Discharge: HOME | End: 2023-12-08
Payer: MEDICARE

## 2023-12-08 VITALS
WEIGHT: 148 LBS | DIASTOLIC BLOOD PRESSURE: 60 MMHG | HEIGHT: 64 IN | BODY MASS INDEX: 25.27 KG/M2 | OXYGEN SATURATION: 98 % | HEART RATE: 62 BPM | SYSTOLIC BLOOD PRESSURE: 156 MMHG

## 2023-12-08 VITALS — WEIGHT: 148.81 LBS | HEIGHT: 64 IN | BODY MASS INDEX: 25.41 KG/M2

## 2023-12-08 DIAGNOSIS — E78.5 DYSLIPIDEMIA: ICD-10-CM

## 2023-12-08 DIAGNOSIS — I35.0 NONRHEUMATIC AORTIC VALVE STENOSIS: ICD-10-CM

## 2023-12-08 DIAGNOSIS — R01.1 MURMUR, CARDIAC: ICD-10-CM

## 2023-12-08 DIAGNOSIS — I10 ESSENTIAL HYPERTENSION: ICD-10-CM

## 2023-12-08 DIAGNOSIS — I35.0 NONRHEUMATIC AORTIC VALVE STENOSIS: Primary | ICD-10-CM

## 2023-12-08 DIAGNOSIS — I73.9 PERIPHERAL VASCULAR DISEASE: ICD-10-CM

## 2023-12-08 LAB
BH CV ECHO MEAS - AO MAX PG: 61.6 MMHG
BH CV ECHO MEAS - AO MEAN PG: 34 MMHG
BH CV ECHO MEAS - AO ROOT DIAM: 3.3 CM
BH CV ECHO MEAS - AO V2 MAX: 392 CM/SEC
BH CV ECHO MEAS - AO V2 VTI: 105.3 CM
BH CV ECHO MEAS - AVA(I,D): 1.25 CM2
BH CV ECHO MEAS - EDV(CUBED): 91.1 ML
BH CV ECHO MEAS - EDV(MOD-SP2): 37.4 ML
BH CV ECHO MEAS - EDV(MOD-SP4): 35.6 ML
BH CV ECHO MEAS - EF(MOD-BP): 68.3 %
BH CV ECHO MEAS - EF(MOD-SP2): 67.9 %
BH CV ECHO MEAS - EF(MOD-SP4): 66.9 %
BH CV ECHO MEAS - ESV(CUBED): 35.9 ML
BH CV ECHO MEAS - ESV(MOD-SP2): 12 ML
BH CV ECHO MEAS - ESV(MOD-SP4): 11.8 ML
BH CV ECHO MEAS - FS: 26.7 %
BH CV ECHO MEAS - IVS/LVPW: 0.93 CM
BH CV ECHO MEAS - IVSD: 1.3 CM
BH CV ECHO MEAS - LA DIMENSION: 4.6 CM
BH CV ECHO MEAS - LAT PEAK E' VEL: 7.8 CM/SEC
BH CV ECHO MEAS - LV DIASTOLIC VOL/BSA (35-75): 20.7 CM2
BH CV ECHO MEAS - LV MASS(C)D: 235.3 GRAMS
BH CV ECHO MEAS - LV MAX PG: 7 MMHG
BH CV ECHO MEAS - LV MEAN PG: 4 MMHG
BH CV ECHO MEAS - LV SYSTOLIC VOL/BSA (12-30): 6.9 CM2
BH CV ECHO MEAS - LV V1 MAX: 132 CM/SEC
BH CV ECHO MEAS - LV V1 VTI: 38.1 CM
BH CV ECHO MEAS - LVIDD: 4.5 CM
BH CV ECHO MEAS - LVIDS: 3.3 CM
BH CV ECHO MEAS - LVOT AREA: 3.5 CM2
BH CV ECHO MEAS - LVOT DIAM: 2.1 CM
BH CV ECHO MEAS - LVPWD: 1.4 CM
BH CV ECHO MEAS - MED PEAK E' VEL: 7.4 CM/SEC
BH CV ECHO MEAS - MR MAX PG: 113.8 MMHG
BH CV ECHO MEAS - MR MAX VEL: 533.5 CM/SEC
BH CV ECHO MEAS - MR MEAN PG: 81.5 MMHG
BH CV ECHO MEAS - MR MEAN VEL: 434.5 CM/SEC
BH CV ECHO MEAS - MR VTI: 206 CM
BH CV ECHO MEAS - MV A MAX VEL: 122 CM/SEC
BH CV ECHO MEAS - MV DEC SLOPE: 570 CM/SEC2
BH CV ECHO MEAS - MV DEC TIME: 0.18 SEC
BH CV ECHO MEAS - MV E MAX VEL: 144 CM/SEC
BH CV ECHO MEAS - MV E/A: 1.18
BH CV ECHO MEAS - MV MAX PG: 10 MMHG
BH CV ECHO MEAS - MV MEAN PG: 4.5 MMHG
BH CV ECHO MEAS - MV P1/2T: 79.3 MSEC
BH CV ECHO MEAS - MV V2 VTI: 44.8 CM
BH CV ECHO MEAS - MVA(P1/2T): 2.8 CM2
BH CV ECHO MEAS - MVA(VTI): 2.9 CM2
BH CV ECHO MEAS - PA ACC TIME: 0.13 SEC
BH CV ECHO MEAS - PI END-D VEL: 138 CM/SEC
BH CV ECHO MEAS - RAP SYSTOLE: 8 MMHG
BH CV ECHO MEAS - RVSP: 63.4 MMHG
BH CV ECHO MEAS - SI(MOD-SP2): 14.8 ML/M2
BH CV ECHO MEAS - SI(MOD-SP4): 13.8 ML/M2
BH CV ECHO MEAS - SV(LVOT): 132.1 ML
BH CV ECHO MEAS - SV(MOD-SP2): 25.4 ML
BH CV ECHO MEAS - SV(MOD-SP4): 23.8 ML
BH CV ECHO MEAS - TAPSE (>1.6): 2 CM
BH CV ECHO MEAS - TR MAX PG: 55.4 MMHG
BH CV ECHO MEAS - TR MAX VEL: 372 CM/SEC
BH CV ECHO MEASUREMENTS AVERAGE E/E' RATIO: 18.95
BH CV VAS BP RIGHT ARM: NORMAL MMHG
BH CV XLRA - RV BASE: 2.9 CM
BH CV XLRA - RV LENGTH: 5.4 CM
BH CV XLRA - RV MID: 2.3 CM
BH CV XLRA - TDI S': 12.4 CM/SEC
IVRT: 99 MS
LEFT ATRIUM VOLUME INDEX: 58.7 ML/M2
LEFT ATRIUM VOLUME: 101 ML
LV EF 2D ECHO EST: 65 %

## 2023-12-08 PROCEDURE — 93306 TTE W/DOPPLER COMPLETE: CPT

## 2023-12-08 RX ORDER — LOSARTAN POTASSIUM AND HYDROCHLOROTHIAZIDE 25; 100 MG/1; MG/1
1 TABLET ORAL DAILY
Qty: 90 TABLET | Refills: 3 | Status: SHIPPED | OUTPATIENT
Start: 2023-12-08

## 2023-12-08 NOTE — PROGRESS NOTES
[facility] Cardiology    Encounter Date: 2023    Patient ID: Patricia Redmond is a 88 y.o. female.  : 1935     PCP: Emmett Martines MD       Chief Complaint: Nonrheumatic aortic valve stenosis, Shortness of Breath, and Leg Swelling      PROBLEM LIST:  Peripheral Artery Disease:  Claudication.  CTA lower extremities with contrast, 2018:   Right: Right SFA is occluded. Popliteal is reconstituted by collaterals and then occluded distally. Peroneal trunk is occluded. Anterior and posterior tibial are occluded and then reconstituted by collaterals.  Left: Left SFA is occluded. Popliteal is occluded. Common peroneal trunk and anterior tibial artery reconstituted by collaterals.  VENUS, 2021: Left 0.78 and right 0.84.  Aortic valve stenosis:  Echo, 2018: EF 56-60%. Left atrial cavity size is mildly dilated. LV diastolic dysfunction (grade I). Moderate AS, mean gradient 21 mmHg. Mild MR/TR. RVSP within normal limits.  Echo, 2019: EF 65%. Mild concentric LVH. Impaired relaxation. Moderate AS, CARLTON 1.2 cm2, mean gradient 26 mmHg. Mild MR/TR, normal RVSP.  Echo, 2020: EF 60%  Moderate AS, mean gradient 20 mmHg. Mild MR/TR.  Echo, 10/15/2021: EF 65%. Moderate AS, mean gradient 26.8 and CARLTON 1.1 cm². Moderate MR. Mild TR with normal RVSP. Mild LVH.   Holter, 10/15/2021: Sinus rhythm. Occasional PACs, rare PVCs, and no pauses. Occasional short SVT/PAT. No symptoms.   Echocardiogram 2022: EF 66-70%, moderate aortic stenosis with CARLTON 1.25 cm², mean gradient 29 mmHg, max gradient 52 mmHg  Echo, 2023: EF 65%. Moderate aortic valve stenosis is present.  Mean gradient 34 mmHg, CARLTON 1.25 cm². Mild MR/TR with elevated RVSP.  Dizziness:   24h Holter, 2019: Symptoms of SOA occured with sinus rhythm. Short runs of SVT 4-6 beats.  Hypertension.  Venous duplex, 2023: Chronic right lower extremity deep vein thrombosis noted in the gastrocnemius. Chronic  right lower extremity superficial thrombophlebitis noted in the small saphenous. Chronic left lower extremity deep vein thrombosis noted in the gastrocnemius. Sub-acute left lower extremity deep vein thrombosis noted in the distal femoral. There was deep venous valvular incompetence noted in the right popliteal.  Dyslipidemia.  DVT x 2 (circa 2002).  On chronic warfarin therapy.  TIA.  Macular degeneration.  Pseudoxanthoma elasticum.  Hypothyroidism.  Osteoporosis.    History of Present Illness  Patient presents today for a follow-up with a history of nonrheumatic aortic valve stenosis, PVD, and cardiac risk factors. Since last visit, patient has been doing well overall from a cardiovascular standpoint. However, she reports that she is often short of breath. Patient notes regularly swelling in her legs. She complains of pain her feet. Patient had a venous duplex 05/03/2023 that showed several old DVT and ST. Her daughter notes that the patient wakes up several times at night to use the bathroom due to the HCTZ and patient was advised to take it in the morning. Patient denies chest pain, orthopnea, palpitations, dizziness, and syncope.     No Known Allergies      Current Outpatient Medications:     amLODIPine (NORVASC) 2.5 MG tablet, Take 1 tablet by mouth Daily., Disp: , Rfl:     atorvastatin (LIPITOR) 20 MG tablet, Take 1 tablet by mouth Daily., Disp: , Rfl: 3    calcium carbonate-vitamin d 600-400 MG-UNIT per tablet, Take 2 tablets by mouth Daily., Disp: , Rfl:     carvedilol (COREG) 25 MG tablet, Take 1 tablet by mouth 2 (Two) Times a Day., Disp: 180 tablet, Rfl: 3    cilostazol (PLETAL) 50 MG tablet, Take 1 tablet by mouth 2 (Two) Times a Day Before Meals., Disp: 180 tablet, Rfl: 3    ezetimibe (ZETIA) 10 MG tablet, Take 1 tablet by mouth Daily., Disp: , Rfl: 2    ferrous sulfate 325 (65 FE) MG tablet, Take 1 tablet by mouth Daily. 1 tablet Daily, Disp: , Rfl:     lansoprazole (PREVACID) 30 MG capsule, Take 1  "capsule by mouth Daily., Disp: , Rfl:     levothyroxine (SYNTHROID, LEVOTHROID) 75 MCG tablet, Take 1 tablet by mouth Daily., Disp: , Rfl: 3    losartan-hydrochlorothiazide (HYZAAR) 50-12.5 MG per tablet, Take 1 tablet by mouth Daily., Disp: , Rfl:     Multiple Vitamin (MULTI-VITAMIN DAILY PO), Take 1 tablet by mouth Daily., Disp: , Rfl:     Multiple Vitamins-Minerals (OCUVITE ADULT FORMULA PO), Take 1 tablet by mouth Daily., Disp: , Rfl:     vitamin B-12 (CYANOCOBALAMIN) 1000 MCG tablet, Take 2 tablets by mouth Daily., Disp: , Rfl:     Vitamin D, Cholecalciferol, 1000 units capsule, Take 2 capsules by mouth Daily., Disp: , Rfl:     vitamin E 400 UNIT capsule, Take 1 capsule by mouth Daily., Disp: , Rfl:     warfarin (COUMADIN) 5 MG tablet, Take 1 tablet by mouth Daily. 5 mg every day and then on Tuesday, Thursday, Sunday takes a total of 7.5 mg, Disp: , Rfl:     The following portions of the patient's history were reviewed and updated as appropriate: allergies, current medications, past family history, past medical history, past social history, past surgical history and problem list.    ROS  Review of Systems   14 point ROS negative except for that listed in the HPI.         Objective:     /60 (BP Location: Left arm, Patient Position: Sitting)   Pulse 62   Ht 162.6 cm (64\")   Wt 67.1 kg (148 lb)   SpO2 98%   BMI 25.40 kg/m²      Physical Exam  Constitutional: Patient appears well-developed and well-nourished.   HENT: HEENT exam unremarkable.   Neck: Neck supple. No JVD present. No carotid bruits.   Cardiovascular: Normal rate, regular rhythm and normal heart sounds. 3/6 SE murmur.  2+ symmetric pulses.   Pulmonary/Chest: Breath sounds normal. Does not exhibit tenderness.   Abdominal: Abdomen benign.   Musculoskeletal: 1+ edema.   Neurological: Neurological exam unremarkable.   Vitals reviewed.    Data Review:   No recent laboratory studies available for review today.     Procedures           Assessment: "      Diagnosis Plan   1. Nonrheumatic aortic valve stenosis, mean gradient 4 mmHg on repeat echocardiogram today, patient is asymptomatic. Stable and asymptomatic. Continue current management and continue to monitor clinically with future echocardiographic follow-up.  Again discussed the symptoms of progressive aortic stenosis including angina lifestyle limiting worsening dyspnea near syncope and syncope and she is advised to contact us in case of any such symptoms.     2. Peripheral vascular disease  Stable and asymptomatic.  Continue risk factor management.   3. Essential hypertension  Elevated. Increase losartan HCTZ to 100-25 mg daily for hypertension and fluid retention. Continue on carvedilol 25 mg BID for hypertension.   4. Dyslipidemia  No labs for review. Continue on atorvastatin 20 mg daily for hyperlipidemia. Continue on Zetia 10 mg daily for hyperlipidemia.      Plan:   Stable cardiac status.  No symptoms from aortic stenosis.  Increase losartan HCTZ to 100-25 mg daily for rate control of hypertension and mild edema.   Continue all other current medications.   We once again discussed symptoms of worsening aortic stenosis and patient to contact us in case of chest symptoms.  FU in 6 MO, sooner as needed.  Thank you for allowing us to participate in the care of your patient.     Scribed for Carolin Green MD by Mulugeta Brown. 12/8/2023 10:58 EST    I, Carolin Green MD, personally performed the services described in this documentation as scribed by the above named individual in my presence, and it is both accurate and complete.  12/10/2023  09:36 EST      Please note that portions of this note may have been completed with a voice recognition program. Efforts were made to edit the dictations, but occasionally words are mistranscribed.

## 2023-12-29 ENCOUNTER — TELEPHONE (OUTPATIENT)
Dept: CARDIAC SURGERY | Facility: CLINIC | Age: 88
End: 2023-12-29

## 2023-12-29 NOTE — TELEPHONE ENCOUNTER
Spoke with Jason, patients emergency conact. Patient would like to do follow up care under Dr. Muro since Dr. Cárdenas is no longer in office. Per RC she stated this would be ok to do so. Patient has been r/s to a Tuesday when Dr. Muro is in office for 6 mo f/u

## 2023-12-29 NOTE — TELEPHONE ENCOUNTER
Caller: Jason Mo    Relationship to patient: Emergency Contact    Best call back number:     [phone] (Home)       Chief complaint: PT NEEDS TO R/S APPT DUE TO TRANSPORT     Type of visit: 6 MO F/U    Requested date: 1/15/24     If rescheduling, when is the original appointment: 1/10/23     Additional notes:PT'S DAUGHTER DRIVES HER TO APPTS AND IS OFF WORK ON 1/15/24 AND WANTED TO SEE IF WE COULD MOVE APPT TIME TO THIS DATE. PT USED TO SEE DR HOUGH - WILL HER CARE MOVE TO DR BARBOUR? THIS IS WHAT PT PREFERS. PLEASE CALL TO R/S  IF ABLE TO R/S TO 1/15/24 - OK TO LEAVE VM WITH APPT TIME.

## 2024-01-12 ENCOUNTER — TELEPHONE (OUTPATIENT)
Dept: CARDIAC SURGERY | Facility: CLINIC | Age: 89
End: 2024-01-12
Payer: MEDICARE

## 2024-01-12 NOTE — TELEPHONE ENCOUNTER
Called patient regarding her insurance she stated that she was aware of that she was OON with [employer] Medicare replacement. I told the patient that if she wants to not pay out of pocket that she will have to call her ins to retrieve a Continuation of care form. She told me  that she will get with her daughter bowen and call her ins company. I told her that she might have to be r/s until she gets a form to cover her visit. She v/u of this and was okay with r/s until 1/30/24 and would call us if she can get the form.

## 2024-01-15 NOTE — PROGRESS NOTES
"     [facility] Cardiothoracic Surgery Office Follow Up Note     Date of Encounter: 2024     Name: Patricia Redmond  : 1935     Referred By: No ref. provider found  PCP: Emmett Martines MD    Chief Complaint:    Chief Complaint   Patient presents with    Peripheral Vascular Disease     6 month follow-up for PVD. Patient states leg pain has improved some from last visit, but she still experiences leg pain        Subjective      History of Present Illness:    Patricia Redmond is a 88 y.o. female non-smoker followed by Dr. Cárdenas for PVD.  Last seen in the clinic 2023 for annual PVD surveillance.  Patient developed acute pain in RLE/right foot May 2023, VENUS at that time mildly abnormal @ 0.8 bilaterally.  She returns today for six month follow up.  PMH: PVD, moderate aortic stenosis, HTN, HLD, DVT circa  on chronic warfarin therapy, TIA, macular degeneration, hypothyroid, anemia, and osteoporosis.  Mrs. Redmond continues to follow with her Cardiologist, Dr. Green.  Last seen in cardiology clinic 2023 with stable echocardiogram demonstrating moderate aortic stenosis with CARLTON 1.25 cm² and AV mean gradient 34 mmHg.  Her antihypertensive therapy was adjusted.    Regarding BLE pain, patient continues to describe pain \"shooting\" across her feet R>L.  Denies leg burning or cramping when walking distances, but states her legs feel more tired w/ ADL's now compared to two years ago.  Patient states she has numbness in both feet.   Patient accompanied by daughter.  Daughter states being closed in during COVID impacted patient's overall stamina because she previously walked for exercise w/ friends.  Now that she no longer walks for exercise, her balance also seems worse (per daughter).  Continues to wear compression socks daily.  Denies any episodes of acute LE edema, redness, or ulcerations.        Review of Systems:  Review of Systems   Constitutional: Negative for chills, decreased appetite, " diaphoresis, fever, malaise/fatigue, night sweats, weight gain and weight loss.   HENT:  Negative for hoarse voice.    Eyes:  Negative for blurred vision, double vision and visual disturbance.   Cardiovascular:  Positive for claudication, dyspnea on exertion and leg swelling. Negative for chest pain, irregular heartbeat, near-syncope, orthopnea, palpitations, paroxysmal nocturnal dyspnea and syncope.   Respiratory:  Negative for cough, hemoptysis, shortness of breath, sputum production and wheezing.    Hematologic/Lymphatic: Negative for adenopathy and bleeding problem. Does not bruise/bleed easily.   Skin:  Negative for color change, nail changes, poor wound healing and rash.   Musculoskeletal:  Negative for back pain, falls and muscle cramps.   Gastrointestinal:  Negative for abdominal pain, dysphagia and heartburn.   Genitourinary:  Negative for flank pain.   Neurological:  Negative for brief paralysis, disturbances in coordination, dizziness, focal weakness, headaches, light-headedness, loss of balance, numbness, paresthesias, sensory change, vertigo and weakness.   Psychiatric/Behavioral:  Negative for depression and suicidal ideas.    Allergic/Immunologic: Negative for persistent infections.       I have reviewed the following portions of the patient's history: problem list, current medications, allergies, past surgical history, past medical history, past social history, past family history, and ROS and confirm it's accurate.    Allergies:  No Known Allergies    Medications:      Current Outpatient Medications:     amLODIPine (NORVASC) 2.5 MG tablet, Take 1 tablet by mouth Daily., Disp: , Rfl:     atorvastatin (LIPITOR) 20 MG tablet, Take 1 tablet by mouth Daily., Disp: , Rfl: 3    calcium carbonate-vitamin d 600-400 MG-UNIT per tablet, Take 2 tablets by mouth Daily., Disp: , Rfl:     carvedilol (COREG) 25 MG tablet, Take 1 tablet by mouth 2 (Two) Times a Day., Disp: 180 tablet, Rfl: 3    cilostazol (PLETAL)  50 MG tablet, Take 1 tablet by mouth 2 (Two) Times a Day Before Meals., Disp: 180 tablet, Rfl: 3    ezetimibe (ZETIA) 10 MG tablet, Take 1 tablet by mouth Daily., Disp: , Rfl: 2    ferrous sulfate 325 (65 FE) MG tablet, Take 1 tablet by mouth Daily. 1 tablet Daily, Disp: , Rfl:     lansoprazole (PREVACID) 30 MG capsule, Take 1 capsule by mouth Daily., Disp: , Rfl:     levothyroxine (SYNTHROID, LEVOTHROID) 75 MCG tablet, Take 1 tablet by mouth Daily., Disp: , Rfl: 3    losartan-hydrochlorothiazide (Hyzaar) 100-25 MG per tablet, Take 1 tablet by mouth Daily., Disp: 90 tablet, Rfl: 3    Multiple Vitamin (MULTI-VITAMIN DAILY PO), Take 1 tablet by mouth Daily., Disp: , Rfl:     Multiple Vitamins-Minerals (OCUVITE ADULT FORMULA PO), Take 1 tablet by mouth Daily., Disp: , Rfl:     vitamin B-12 (CYANOCOBALAMIN) 1000 MCG tablet, Take 2 tablets by mouth Daily., Disp: , Rfl:     Vitamin D, Cholecalciferol, 1000 units capsule, Take 2 capsules by mouth Daily., Disp: , Rfl:     vitamin E 400 UNIT capsule, Take 1 capsule by mouth Daily., Disp: , Rfl:     warfarin (COUMADIN) 5 MG tablet, Take 1 tablet by mouth Daily. 5 mg every day and then on Tuesday, Thursday, Sunday takes a total of 7.5 mg, Disp: , Rfl:     History:   Past Medical History:   Diagnosis Date    Degenerative disc disease, lumbar     Dyslipidemia     Hypertension     Hypothyroidism     Inguinal hernia     Macular degeneration     Osteoporosis     Peripheral vascular disease     Pseudoxanthoma elasticum     TIA (transient ischemic attack)        Past Surgical History:   Procedure Laterality Date    HYSTERECTOMY      INGUINAL HERNIA REPAIR Left     INNER EAR SURGERY Right     SYMPATHECTOMY      TONSILLECTOMY         Social History     Socioeconomic History    Marital status:     Number of children: 4   Tobacco Use    Smoking status: Never    Smokeless tobacco: Never   Vaping Use    Vaping Use: Never used   Substance and Sexual Activity    Alcohol use: No     Drug use: No    Sexual activity: Defer        Family History   Problem Relation Age of Onset    Hypertension Mother     Sick sinus syndrome Mother     Heart failure Mother     Atrial fibrillation Father     Hyperlipidemia Sister     Hypertension Sister     Hyperlipidemia Sister     Hypertension Sister     Hyperlipidemia Sister     Hypertension Sister     Hyperlipidemia Sister     Hypertension Sister     Valvular heart disease Brother        Objective   Physical Exam:  Vitals:    01/16/24 1327 01/16/24 1330   BP: 142/79 159/80   BP Location: Right arm Left arm   Patient Position: Sitting Sitting   Pulse: 66    Temp: 97.5 °F (36.4 °C)    SpO2: 98%    Weight: 66.5 kg (146 lb 9.6 oz)       Body mass index is 25.16 kg/m².    Physical Exam  Vitals reviewed.   Constitutional:       General: She is not in acute distress.     Appearance: She is well-developed and well-groomed.      Comments: Appears younger than stated age   HENT:      Head: Normocephalic and atraumatic.   Eyes:      General: Lids are normal.      Conjunctiva/sclera: Conjunctivae normal.      Pupils: Pupils are equal, round, and reactive to light.   Cardiovascular:      Rate and Rhythm: Normal rate and regular rhythm.      Pulses:           Dorsalis pedis pulses are 1+ on the right side and 1+ on the left side.        Posterior tibial pulses are 1+ on the right side and 1+ on the left side.      Heart sounds: S1 normal and S2 normal. Murmur heard.      Systolic murmur is present with a grade of 3/6.      Comments: Trace bilateral pedal and lower extremity edema.  Palpable bilateral pedal pulses.  Feet are warm.  Bilateral superficial varicosities visible @ upper and lower legs  Pulmonary:      Effort: Pulmonary effort is normal. No respiratory distress.      Breath sounds: Normal breath sounds.   Musculoskeletal:         General: Normal range of motion.      Cervical back: Normal range of motion and neck supple.      Right lower leg: Edema present.      Left  lower leg: Edema present.   Feet:      Right foot:      Skin integrity: Skin integrity normal.      Toenail Condition: Right toenails are normal.      Left foot:      Skin integrity: Skin integrity normal.      Toenail Condition: Left toenails are normal.   Skin:     General: Skin is warm and dry.      Capillary Refill: Capillary refill takes less than 2 seconds.   Neurological:      General: No focal deficit present.      Mental Status: She is alert and oriented to person, place, and time.      Comments: Legally blind   Psychiatric:         Attention and Perception: Attention normal.         Mood and Affect: Mood normal.         Speech: Speech normal.         Behavior: Behavior normal. Behavior is cooperative.         Imaging/Labs:  VENUS 5/3/23 Interpretation Summary   Right lower extremity: VENUS 0.86. Waveforms consistent with mild peripheral arterial disease at tibial level.   Left lower extremity: VENUS 0.81. Waveforms consistent with mild peripheral artery disease at tibial level.      Duplex Venous Lower Extremity - Bilateral 5/3/23  Interpretation Summary    Chronic right lower extremity deep vein thrombosis noted in the gastrocnemius.    Chronic right lower extremity superficial thrombophlebitis noted in the small saphenous.    Chronic left lower extremity deep vein thrombosis noted in the gastrocnemius.    Sub-acute left lower extremity deep vein thrombosis noted in the distal femoral.    There was deep venous valvular incompetence noted in the right popliteal.    PERIPHERAL VASCULAR STUDY   PATIENT:  Patricia Redmond              :  1935   MRN:  [id number]   REQUESTING PHYSICIAN: Jaylon Cárdenas MD   INDICATION: Documented peripheral vascular disease with bilateral claudication               DESCRIPTION: Standard 4 cuff technique was utilized.  Waveforms have slightly decreased amplitude at all levels and are biphasic.  ABIs are diminished bilaterally.   RIGHT VENUS:    PT=0.77                           DP=0.75      LEFT VENUS:       PT=0.58                          DP=0.54   IMPRESSION: Decreased ABIs with decreased PVR amplitude consistent with moderate/severe peripheral vascular occlusive disease.                                       Electronically signed by Jaylon Cárdenas MD, 07/14/22, 10:26 AM      Ankle-Brachial Index (VENUS): 7/2/2021 7:44 AM   FINDINGS:    RIGHT VENUS:  Right composite VENUS of 0.84.    LEFT VENUS:  Left composite VENUS of 0.78.   IMPRESSION:  1.  Right composite VENUS of 0.84.  2.  Left composite VENUS of 0.78.   This report was finalized on 7/2/2021 8:10 AM by Dr. Gilberto Bañuelos MD.      CTA 4/25/2018 @ [facility]  Findings:  CTA distal aorta normal  CTA right lower extremity: Right superficial femoral is occluded.  The popliteal is reconstituted by collaterals and then occluded distally.  The common peroneal trunk is occluded.  The anterior and posterior tibial are occluded and reconstituted by collaterals.  Multiple collateral vessels visible within the musculature on the right.  CT of the left lower extremity: The left superficial femoral is occluded.  The profundofemoral is open.  Popliteal is occluded.  Left common peroneal trunk and anterior tibial are reconstituted by collaterals.  Multiple collateral vessels visible within the subcutaneous fat of the left.  Assessment / Plan      Assessment / Plan:  1. Peripheral vascular disease (Primary)  2. Nonrheumatic aortic valve stenosis  3. DVT RLE on chronic warfarin  - 88 y.o. female non-smoker followed by Dr. Cárdenas for PVD.   - Patient developed acute pain in RLE/right foot May 2023, VENUS at that time mildly abnormal @ 0.8 bilaterally/ stable compared to prior VENUS studies.  - PMH: PVD, moderate aortic stenosis, HTN, HLD, DVT circa 2002 on chronic warfarin therapy, TIA, macular degeneration, hypothyroid, anemia, and osteoporosis.    - Continues to follow with her Cardiologist, Dr. Green.    - Seen in cardiology clinic 12/8/2023 with stable echocardiogram  "demonstrating moderate aortic stenosis with CRALTON 1.25 cm² and AV mean gradient 34 mmHg.    - Regarding BLE pain, patient continues to describe pain \"shooting\" across her feet R>L.  Denies leg burning or cramping when walking distances, but states her legs feel more tired w/ ADL's now compared to two years ago.    - Patient states she has numbness in both feet/ last OV, Dr. Cárdenas suggested Neurology eval, but patient has not decided if she would schedule.  - Foot/ leg pain reported to improve with OTC Tylenol  - PCP has recommended Rheumatology evaluation which she is considering  - Daughter states being closed in during COVID impacted patient's overall stamina because she previously walked for exercise w/ friends.  Now that she no longer walks for exercise, her balance also seems worse (per daughter).    - Consider course of physical therapy for gait training/strengthening.  Discuss w/ PCP  - Continue compression socks daily and warfarin for venous disease  - Continue Statin, Pletal, Zetia, and warfarin for arterial PVD  - Will plan follow up in 6 months w/ updated VENUS and meet Dr. Muro since Dr. Cárdenas has retired should patient require surgical intervention for PVD or AS in the future.        Follow Up:   Return in about 6 months (around 7/16/2024) for VENUS (schedule on a Tuesday AM to meet Dr. Muro).   Or sooner for any further concerns or worsening sign and symptoms. If unable to reach us in the office please dial 911 or go to the nearest emergency department.      JOSEPH Shine  [facility] Cardiothoracic Surgery    Time Spent: I spent 46 minutes caring for Patricia on this date of service. This time includes time spent by me in the following activities: preparing for the visit, reviewing tests, obtaining and/or reviewing a separately obtained history, performing a medically appropriate examination and/or evaluation, counseling and educating the patient/family/caregiver, documenting information in the " medical record, and care coordination.

## 2024-01-16 ENCOUNTER — OFFICE VISIT (OUTPATIENT)
Dept: CARDIAC SURGERY | Facility: CLINIC | Age: 89
End: 2024-01-16
Payer: MEDICARE

## 2024-01-16 VITALS
WEIGHT: 146.6 LBS | DIASTOLIC BLOOD PRESSURE: 80 MMHG | SYSTOLIC BLOOD PRESSURE: 159 MMHG | TEMPERATURE: 97.5 F | HEART RATE: 66 BPM | OXYGEN SATURATION: 98 % | BODY MASS INDEX: 25.16 KG/M2

## 2024-01-16 DIAGNOSIS — I82.4Y1 DEEP VEIN THROMBOSIS (DVT) OF PROXIMAL VEIN OF RIGHT LOWER EXTREMITY, UNSPECIFIED CHRONICITY: ICD-10-CM

## 2024-01-16 DIAGNOSIS — I73.9 PERIPHERAL VASCULAR DISEASE: Primary | ICD-10-CM

## 2024-01-16 DIAGNOSIS — I35.0 NONRHEUMATIC AORTIC VALVE STENOSIS: ICD-10-CM

## 2024-01-16 PROCEDURE — 1160F RVW MEDS BY RX/DR IN RCRD: CPT | Performed by: NURSE PRACTITIONER

## 2024-01-16 PROCEDURE — 99215 OFFICE O/P EST HI 40 MIN: CPT | Performed by: NURSE PRACTITIONER

## 2024-01-16 PROCEDURE — 1159F MED LIST DOCD IN RCRD: CPT | Performed by: NURSE PRACTITIONER

## 2024-06-24 NOTE — PROGRESS NOTES
[facility] Cardiology    Encounter Date: 2024    Patient ID: Patricia Redmond is a 89 y.o. female.  : 1935     PCP: Emmett Martines MD       Chief Complaint: Nonrheumatic aortic valve stenosis and Edema      PROBLEM LIST:  Peripheral Artery Disease:  Claudication.  CTA lower extremities with contrast, 2018:   Right: Right SFA is occluded. Popliteal is reconstituted by collaterals and then occluded distally. Peroneal trunk is occluded. Anterior and posterior tibial are occluded and then reconstituted by collaterals.  Left: Left SFA is occluded. Popliteal is occluded. Common peroneal trunk and anterior tibial artery reconstituted by collaterals.  VENUS, 2021: Left 0.78 and right 0.84.  Aortic valve stenosis:  Echo, 2018: EF 56-60%. Left atrial cavity size is mildly dilated. LV diastolic dysfunction (grade I). Moderate AS, mean gradient 21 mmHg. Mild MR/TR. RVSP within normal limits.  Echo, 2019: EF 65%. Mild concentric LVH. Impaired relaxation. Moderate AS, CARLTON 1.2 cm2, mean gradient 26 mmHg. Mild MR/TR, normal RVSP.  Echo, 2020: EF 60%  Moderate AS, mean gradient 20 mmHg. Mild MR/TR.  Echo, 10/15/2021: EF 65%. Moderate AS, mean gradient 26.8 and CARLTON 1.1 cm². Moderate MR. Mild TR with normal RVSP. Mild LVH.   Holter, 10/15/2021: Sinus rhythm. Occasional PACs, rare PVCs, and no pauses. Occasional short SVT/PAT. No symptoms.   Echocardiogram 2022: EF 66-70%, moderate aortic stenosis with CARLTON 1.25 cm², mean gradient 29 mmHg, max gradient 52 mmHg  Echo, 2023: EF 65%. Moderate aortic valve stenosis is present.  Mean gradient 34 mmHg, CARLTON 1.25 cm². Mild MR/TR with elevated RVSP.  Dizziness:   24h Holter, 2019: Symptoms of SOA occured with sinus rhythm. Short runs of SVT 4-6 beats.  Hypertension.  Venous duplex, 2023: Chronic right lower extremity deep vein thrombosis noted in the gastrocnemius. Chronic right lower extremity  superficial thrombophlebitis noted in the small saphenous. Chronic left lower extremity deep vein thrombosis noted in the gastrocnemius. Sub-acute left lower extremity deep vein thrombosis noted in the distal femoral. There was deep venous valvular incompetence noted in the right popliteal.  Dyslipidemia.  DVT x 2 (circa 2002).  On chronic warfarin therapy.  TIA.  Macular degeneration.  Pseudoxanthoma elasticum.  Hypothyroidism.  Osteoporosis.    History of Present Illness  Patient presents today for a follow-up with a history of nonrheumatic aortic valve stenosis, PVD, and cardiac risk factors. Since last visit, patient has been doing well from a cardiac standpoint. She does continued to live alone. She still does some of house work and tolerates this well. She is no longer driving. Patient denies any chest pain, shortness of air, palpitations, orthopnea, edema, presyncope or syncope. She follows with her PCP regularly. BP has been controlled at home.She has had a few episodes of lightheadedness when she stands up after she eats big meals.       No Known Allergies      Current Outpatient Medications:     amLODIPine (NORVASC) 2.5 MG tablet, Take 1 tablet by mouth Daily., Disp: , Rfl:     atorvastatin (LIPITOR) 20 MG tablet, Take 1 tablet by mouth Daily., Disp: , Rfl: 3    calcium carbonate-vitamin d 600-400 MG-UNIT per tablet, Take 2 tablets by mouth Daily., Disp: , Rfl:     carvedilol (COREG) 25 MG tablet, Take 1 tablet by mouth 2 (Two) Times a Day., Disp: 180 tablet, Rfl: 3    cilostazol (PLETAL) 50 MG tablet, Take 1 tablet by mouth 2 (Two) Times a Day Before Meals., Disp: 180 tablet, Rfl: 3    ezetimibe (ZETIA) 10 MG tablet, Take 1 tablet by mouth Daily., Disp: , Rfl: 2    ferrous sulfate 325 (65 FE) MG tablet, Take 1 tablet by mouth Daily. 1 tablet Daily, Disp: , Rfl:     lansoprazole (PREVACID) 30 MG capsule, Take 1 capsule by mouth Daily., Disp: , Rfl:     levothyroxine (SYNTHROID, LEVOTHROID) 75 MCG tablet,  "Take 1 tablet by mouth Daily., Disp: , Rfl: 3    losartan-hydrochlorothiazide (Hyzaar) 100-25 MG per tablet, Take 1 tablet by mouth Daily., Disp: 90 tablet, Rfl: 3    Multiple Vitamin (MULTI-VITAMIN DAILY PO), Take 1 tablet by mouth Daily., Disp: , Rfl:     Multiple Vitamins-Minerals (OCUVITE ADULT FORMULA PO), Take 1 tablet by mouth Daily., Disp: , Rfl:     vitamin B-12 (CYANOCOBALAMIN) 1000 MCG tablet, Take 2 tablets by mouth Daily., Disp: , Rfl:     Vitamin D, Cholecalciferol, 1000 units capsule, Take 2 capsules by mouth Daily., Disp: , Rfl:     vitamin E 400 UNIT capsule, Take 1 capsule by mouth Daily., Disp: , Rfl:     warfarin (COUMADIN) 5 MG tablet, Take 1 tablet by mouth Daily., Disp: , Rfl:     The following portions of the patient's history were reviewed and updated as appropriate: allergies, current medications, past family history, past medical history, past social history, past surgical history and problem list.        Objective:     /62 (BP Location: Left arm, Patient Position: Sitting)   Pulse 79   Ht 162.6 cm (64\")   Wt 66.7 kg (147 lb)   SpO2 96%   BMI 25.23 kg/m²      Physical Exam  Constitutional: Patient appears well-developed and well-nourished.   HENT: HEENT exam unremarkable.   Neck: Neck supple. No JVD present  Cardiovascular: Normal rate, regular rhythm and normal heart sounds. No murmur heard.   2+ symmetric pulses.   Pulmonary/Chest: Breath sounds normal. Does not exhibit tenderness.   Musculoskeletal: Does not exhibit edema.   Neurological: Neurological exam unremarkable.   Vitals reviewed.    Data Review:   No recent laboratory studies available for review today.       ECG 12 Lead    Date/Time: 6/26/2024 3:54 PM  Performed by: Sierra Leroy PA-C    Authorized by: Sierra Leroy PA-C  Comparison: compared with previous ECG from 5/18/2018  Similar to previous ECG  Rhythm: sinus rhythm  Rate: normal  BPM: 68    Clinical impression: normal ECG             Advance Care " Planning   ACP discussion was held with the patient during this visit. Patient does not have an advance directive, declines further assistance.           Assessment and plan:      Diagnosis Plan   1. Nonrheumatic aortic valve stenosis  Stable on last echocardiogram.  Will obtain follow-up echocardiogram on same day as 6-month follow-up for surveillance.    Adult Transthoracic Echo Complete w/ Color, Spectral and Contrast if necessary per protocol      2. Peripheral vascular disease  No current claudication symptoms.  Continue statin and Pletal.      3. Essential hypertension  Well-controlled.  Continue amlodipine, Coreg, Hyzaar at current dose.      4. Dyslipidemia  Continue Zetia and Lipitor        Stable cardiac status.   Continue current medications.   FU in 6 MO, sooner as needed.  Thank you for allowing us to participate in the care of your patient.       Sierra Leroy PA-C      Please note that portions of this note may have been completed with a voice recognition program. Efforts were made to edit the dictations, but occasionally words are mistranscribed.

## 2024-06-25 ENCOUNTER — OFFICE VISIT (OUTPATIENT)
Dept: CARDIOLOGY | Facility: CLINIC | Age: 89
End: 2024-06-25
Payer: MEDICARE

## 2024-06-25 VITALS
HEART RATE: 79 BPM | OXYGEN SATURATION: 96 % | DIASTOLIC BLOOD PRESSURE: 62 MMHG | BODY MASS INDEX: 25.1 KG/M2 | HEIGHT: 64 IN | WEIGHT: 147 LBS | SYSTOLIC BLOOD PRESSURE: 118 MMHG

## 2024-06-25 DIAGNOSIS — I10 ESSENTIAL HYPERTENSION: ICD-10-CM

## 2024-06-25 DIAGNOSIS — E78.5 DYSLIPIDEMIA: ICD-10-CM

## 2024-06-25 DIAGNOSIS — I35.0 NONRHEUMATIC AORTIC VALVE STENOSIS: Primary | ICD-10-CM

## 2024-06-25 DIAGNOSIS — I73.9 PERIPHERAL VASCULAR DISEASE: ICD-10-CM

## 2024-06-25 PROCEDURE — 99214 OFFICE O/P EST MOD 30 MIN: CPT

## 2024-06-25 PROCEDURE — 1159F MED LIST DOCD IN RCRD: CPT

## 2024-06-25 PROCEDURE — 1160F RVW MEDS BY RX/DR IN RCRD: CPT

## 2024-06-26 PROCEDURE — 93000 ELECTROCARDIOGRAM COMPLETE: CPT

## 2024-07-02 ENCOUNTER — TELEPHONE (OUTPATIENT)
Dept: CARDIAC SURGERY | Facility: CLINIC | Age: 89
End: 2024-07-02

## 2024-07-02 DIAGNOSIS — I73.9 PERIPHERAL VASCULAR DISEASE: Primary | ICD-10-CM

## 2024-07-02 NOTE — TELEPHONE ENCOUNTER
Caller: Jason Mo    Relationship: Emergency Contact    Best call back number: [phone]    What orders are you requesting (i.e. lab or imaging): VENUS    In what timeframe would the patient need to come in: BEFORE JULY 28    Where will you receive your lab/imaging services: [facility]    Additional notes: PTS DAUGHTER STATES THEY WOULD LIKE TO BE FOLLOWED BY DR. BARBOUR MOVING FORWARD AS WELL. PLEASE GIVE DAUGHTER A CALL TO SCHEDULE TESTING. THANK YOU

## 2024-07-12 ENCOUNTER — TELEPHONE (OUTPATIENT)
Dept: CARDIAC SURGERY | Facility: CLINIC | Age: 89
End: 2024-07-12
Payer: MEDICARE

## 2024-07-12 NOTE — TELEPHONE ENCOUNTER
Caller: NAM BARRIOS     Relationship to patient: DAUGHTER    Best call back number: [phone]     Chief complaint: PATIENT WOULD LIKE AN APPOINTMENT WITH DR. BARBOUR AFTER VENUS     Type of visit: FOLLOW UP     Requested date: AFTER 9/16/24 OR SOONER IF TESTING CAN BE MOVED.     If rescheduling, when is the original appointment: 7/30/24     Additional notes: PATIENTS DAUGHTER NAM WOULD LIKE A CALL BACK.     NAM NOTED THAT SHE HAS HAD ISSUES GETTING RETURN PHONE CALLS.

## 2024-07-15 NOTE — TELEPHONE ENCOUNTER
Called daughter, but unable to reach, left message to call back. Rescheduled appointment to 9/24/24 with APRN. Dr. Muro should be in office and available that day as well, but appointment will not be scheduled with just [facility].

## 2024-09-16 ENCOUNTER — HOSPITAL ENCOUNTER (OUTPATIENT)
Dept: CARDIOLOGY | Facility: HOSPITAL | Age: 89
Discharge: HOME | End: 2024-09-16
Admitting: NURSE PRACTITIONER
Payer: MEDICARE

## 2024-09-16 VITALS — HEIGHT: 64 IN | BODY MASS INDEX: 25.1 KG/M2 | WEIGHT: 147.05 LBS

## 2024-09-16 DIAGNOSIS — I73.9 PERIPHERAL VASCULAR DISEASE: ICD-10-CM

## 2024-09-16 LAB
BH CV LOWER ARTERIAL LEFT ABI RATIO: 0.94
BH CV LOWER ARTERIAL LEFT DORSALIS PEDIS SYS MAX: 114
BH CV LOWER ARTERIAL LEFT POST TIBIAL SYS MAX: 133
BH CV LOWER ARTERIAL LEFT TBI RATIO: 0.3
BH CV LOWER ARTERIAL RIGHT ABI RATIO: 0.59
BH CV LOWER ARTERIAL RIGHT DORSALIS PEDIS SYS MAX: 66
BH CV LOWER ARTERIAL RIGHT POST TIBIAL SYS MAX: 84
BH CV LOWER ARTERIAL RIGHT TBI RATIO: 0.3
UPPER ARTERIAL LEFT ARM BRACHIAL SYS MAX: 141
UPPER ARTERIAL RIGHT ARM BRACHIAL SYS MAX: 133

## 2024-09-16 PROCEDURE — 93922 UPR/L XTREMITY ART 2 LEVELS: CPT | Performed by: INTERNAL MEDICINE

## 2024-09-16 PROCEDURE — 93922 UPR/L XTREMITY ART 2 LEVELS: CPT

## 2024-09-24 ENCOUNTER — OFFICE VISIT (OUTPATIENT)
Dept: CARDIAC SURGERY | Facility: CLINIC | Age: 89
End: 2024-09-24
Payer: MEDICARE

## 2024-09-24 VITALS
HEIGHT: 64 IN | DIASTOLIC BLOOD PRESSURE: 70 MMHG | BODY MASS INDEX: 24.72 KG/M2 | WEIGHT: 144.8 LBS | SYSTOLIC BLOOD PRESSURE: 150 MMHG | HEART RATE: 76 BPM | OXYGEN SATURATION: 97 % | TEMPERATURE: 97.4 F

## 2024-09-24 DIAGNOSIS — I73.9 PERIPHERAL VASCULAR DISEASE: Primary | ICD-10-CM

## 2024-09-24 PROCEDURE — 1160F RVW MEDS BY RX/DR IN RCRD: CPT | Performed by: NURSE PRACTITIONER

## 2024-09-24 PROCEDURE — 1159F MED LIST DOCD IN RCRD: CPT | Performed by: NURSE PRACTITIONER

## 2024-09-24 PROCEDURE — 99213 OFFICE O/P EST LOW 20 MIN: CPT | Performed by: NURSE PRACTITIONER

## 2024-12-09 NOTE — H&P (VIEW-ONLY)
[facility] Cardiology    Encounter Date: 2024    Patient ID: Patricia Redmond is a 89 y.o. female.  : 1935     PCP: Emmett Martines MD       Chief Complaint: Nonrheumatic aortic valve stenosis      PROBLEM LIST:  Peripheral Artery Disease:  Claudication.  CTA lower extremities with contrast, 2018:   Right: Right SFA is occluded. Popliteal is reconstituted by collaterals and then occluded distally. Peroneal trunk is occluded. Anterior and posterior tibial are occluded and then reconstituted by collaterals.  Left: Left SFA is occluded. Popliteal is occluded. Common peroneal trunk and anterior tibial artery reconstituted by collaterals.  VENUS, 2021: Left 0.78 and right 0.84.  VENUS, 2024: Right VENUS moderately reduced. Left VENUS normal.   Aortic valve stenosis:  Echo, 2018: EF 56-60%. Left atrial cavity size is mildly dilated. LV diastolic dysfunction (grade I). Moderate AS, mean gradient 21 mmHg. Mild MR/TR. RVSP within normal limits.  Echo, 2019: EF 65%. Mild concentric LVH. Impaired relaxation. Moderate AS, CARLTON 1.2 cm2, mean gradient 26 mmHg. Mild MR/TR, normal RVSP.  Echo, 2020: EF 60%  Moderate AS, mean gradient 20 mmHg. Mild MR/TR.  Echo, 10/15/2021: EF 65%. Moderate AS, mean gradient 26.8 and CARLTON 1.1 cm². Moderate MR. Mild TR with normal RVSP. Mild LVH.   Holter, 10/15/2021: Sinus rhythm. Occasional PACs, rare PVCs, and no pauses. Occasional short SVT/PAT. No symptoms.   Echocardiogram 2022: EF 66-70%, moderate aortic stenosis with CARLTON 1.25 cm², mean gradient 29 mmHg, max gradient 52 mmHg  Echo, 2023: EF 65%. Moderate aortic valve stenosis is present.  Mean gradient 34 mmHg, CARLTON 1.25 cm². Mild MR/TR with elevated RVSP.  Echo, 2024: EF 51-55%. LV wall thickness consistent with mild concentric hypertrophy. LA volume is moderately increased, Severe AV stenosis with mean gradient 47 mmHg. Mild aortic insufficiency. Mild MR  and TR with normal RVSP.   Dizziness:   24h Holter, 03/28/2019: Symptoms of SOA occured with sinus rhythm. Short runs of SVT 4-6 beats.  Hypertension.  Venous duplex, 05/03/2023: Chronic right lower extremity deep vein thrombosis noted in the gastrocnemius. Chronic right lower extremity superficial thrombophlebitis noted in the small saphenous. Chronic left lower extremity deep vein thrombosis noted in the gastrocnemius. Sub-acute left lower extremity deep vein thrombosis noted in the distal femoral. There was deep venous valvular incompetence noted in the right popliteal.  Dyslipidemia.  DVT x 2 (circa 2002).  On chronic warfarin therapy.  TIA.  Macular degeneration.  Pseudoxanthoma elasticum.  Hypothyroidism.  Osteoporosis.    History of Present Illness  Patient presents today for a 6 month follow-up with a history of nonrheumatic AVS, PVD, and cardiac risk factors. Since last visit, patient has been doing well overall from a cardiovascular standpoint. She reports recent episodes of left-sided chest pain and shoulder pain that occur almost every night.  States that this is a deep inside pain and does not worsen with movement.  She does not have similar pain with activities during the day.  No worsening of edema, no orthopnea PND palpitation or syncope.      No Known Allergies      Current Outpatient Medications:     atorvastatin (LIPITOR) 20 MG tablet, Take 1 tablet by mouth Daily., Disp: , Rfl: 3    calcium carbonate-vitamin d 600-400 MG-UNIT per tablet, Take 2 tablets by mouth Daily., Disp: , Rfl:     carvedilol (COREG) 25 MG tablet, Take 1 tablet by mouth 2 (Two) Times a Day., Disp: 180 tablet, Rfl: 3    cilostazol (PLETAL) 50 MG tablet, Take 1 tablet by mouth 2 (Two) Times a Day Before Meals., Disp: 180 tablet, Rfl: 3    ezetimibe (ZETIA) 10 MG tablet, Take 1 tablet by mouth Daily., Disp: , Rfl: 2    ferrous sulfate 325 (65 FE) MG tablet, Take 1 tablet by mouth Daily. 1 tablet Daily, Disp: , Rfl:      "lansoprazole (PREVACID) 30 MG capsule, Take 1 capsule by mouth Daily., Disp: , Rfl:     levothyroxine (SYNTHROID, LEVOTHROID) 75 MCG tablet, Take 1 tablet by mouth Daily., Disp: , Rfl: 3    losartan-hydrochlorothiazide (Hyzaar) 100-25 MG per tablet, Take 1 tablet by mouth Daily., Disp: 90 tablet, Rfl: 3    Multiple Vitamin (MULTI-VITAMIN DAILY PO), Take 1 tablet by mouth Daily., Disp: , Rfl:     Multiple Vitamins-Minerals (OCUVITE ADULT FORMULA PO), Take 1 tablet by mouth Daily., Disp: , Rfl:     vitamin B-12 (CYANOCOBALAMIN) 1000 MCG tablet, Take 2 tablets by mouth Daily., Disp: , Rfl:     Vitamin D, Cholecalciferol, 1000 units capsule, Take 2 capsules by mouth Daily., Disp: , Rfl:     vitamin E 400 UNIT capsule, Take 1 capsule by mouth Daily., Disp: , Rfl:     warfarin (COUMADIN) 5 MG tablet, Take 1 tablet by mouth Daily., Disp: , Rfl:     The following portions of the patient's history were reviewed and updated as appropriate: allergies, current medications, past family history, past medical history, past social history, past surgical history and problem list.    ROS  Review of Systems   14 point ROS negative except for that listed in the HPI.         Objective:     /70 (BP Location: Left arm, Patient Position: Sitting)   Pulse 75   Ht 162.6 cm (64\")   Wt 65.8 kg (145 lb)   SpO2 98%   BMI 24.89 kg/m²      Physical Exam  Constitutional: Patient appears well-developed and well-nourished.   HENT: HEENT exam unremarkable.   Neck: Neck supple. No JVD present. No carotid bruits.   Cardiovascular: Normal rate, regular rhythm and normal heart sounds.  3/6 systolic ejection murmur.  2+ symmetric pulses.   Pulmonary/Chest: Breath sounds normal. Does not exhibit tenderness.   Abdominal: Abdomen benign.   Musculoskeletal: No significant edema.  Neurological: Neurological exam unremarkable.   Vitals reviewed.    Data Review:   No recent laboratory studies available for review today.     Procedures       Advance " Care Planning   ACP discussion was declined by the patient. Patient does not have an advance directive, declines further assistance.           Assessment:      Diagnosis Plan   1. Nonrheumatic aortic valve stenosis this has significantly progressed with associated symptoms of angina. Recent echo shows severe aortic valve stenosis. TAVR process was discussed, scheduling to start, see details below..       2. Peripheral vascular disease  Stable and asymptomatic.  Continue Pletal.      3. Essential hypertension  Acceptable control, continue on carvedilol 25 mg BID and losartan 100-25 mg daily for hypertension.  Patient states that her blood pressure was running low and she is taking half tablet of losartan HCTZ.      4. Dyslipidemia  No labs for review today. Continue atorvastatin 20 mg and zetia 10 mg daily for dyslipidemia.         Plan:   Patient now has severe aortic stenosis with associated symptoms of angina.  She appears to be a reasonable candidate for TAVR, procedure discussed.  Goals of care were discussed with the patient.  Shared decision-making approach was used as we discussed the patient's treatment options for severe symptomatic aortic stenosis.  Treatment options include TAVR, SAVR and medical management.  Risks and benefits of transcatheter aortic valve replacement (TAVR) versus surgical aortic valve replacement (SAVR) were discussed.  Risks include but are not limited to death, stroke, bleeding, vascular injury, heart rhythm disturbance (including possible need for permanent pacemaker), infection, heart attack, kidney failure and other organ failure.   Schedule cardiac cath and DORIS, subsequently we will ask the heart valve coordinator to see the patient and set up appointment with CT surgery.  She will also need a CTA.  Continue current medications.  Hold warfarin for 4 days before procedure.  FU after procedures, sooner as needed.  Thank you for allowing us to participate in the care of your  patient.     Scribed for Carolin Grene MD by Phi Winter. 12/13/2024  10:43 EST    I, Carolin Green MD, personally performed the services described in this documentation as scribed by the above named individual in my presence, and it is both accurate and complete.  12/13/2024  11:14 EST      Please note that portions of this note may have been completed with a voice recognition program. Efforts were made to edit the dictations, but occasionally words are mistranscribed.

## 2024-12-09 NOTE — PROGRESS NOTES
[facility]    Encounter Date: 2024    Patient ID: Patricia Redmond is a 89 y.o. female.  : 1935     PCP: Emmett Martines MD       Chief Complaint: Nonrheumatic aortic valve stenosis      PROBLEM LIST:  Peripheral Artery Disease:  Claudication.  CTA lower extremities with contrast, 2018:   Right: Right SFA is occluded. Popliteal is reconstituted by collaterals and then occluded distally. Peroneal trunk is occluded. Anterior and posterior tibial are occluded and then reconstituted by collaterals.  Left: Left SFA is occluded. Popliteal is occluded. Common peroneal trunk and anterior tibial artery reconstituted by collaterals.  VENUS, 2021: Left 0.78 and right 0.84.  VENUS, 2024: Right VENUS moderately reduced. Left VENUS normal.   Aortic valve stenosis:  Echo, 2018: EF 56-60%. Left atrial cavity size is mildly dilated. LV diastolic dysfunction (grade I). Moderate AS, mean gradient 21 mmHg. Mild MR/TR. RVSP within normal limits.  Echo, 2019: EF 65%. Mild concentric LVH. Impaired relaxation. Moderate AS, CARLTON 1.2 cm2, mean gradient 26 mmHg. Mild MR/TR, normal RVSP.  Echo, 2020: EF 60%  Moderate AS, mean gradient 20 mmHg. Mild MR/TR.  Echo, 10/15/2021: EF 65%. Moderate AS, mean gradient 26.8 and CARLTON 1.1 cm². Moderate MR. Mild TR with normal RVSP. Mild LVH.   Holter, 10/15/2021: Sinus rhythm. Occasional PACs, rare PVCs, and no pauses. Occasional short SVT/PAT. No symptoms.   Echocardiogram 2022: EF 66-70%, moderate aortic stenosis with CARLTON 1.25 cm², mean gradient 29 mmHg, max gradient 52 mmHg  Echo, 2023: EF 65%. Moderate aortic valve stenosis is present.  Mean gradient 34 mmHg, CARLTON 1.25 cm². Mild MR/TR with elevated RVSP.  Echo, 2024: EF 51-55%. LV wall thickness consistent with mild concentric hypertrophy. LA volume is moderately increased, Severe AV stenosis with mean gradient 47 mmHg. Mild aortic insufficiency. Mild MR  and TR with normal RVSP.   Dizziness:   24h Holter, 03/28/2019: Symptoms of SOA occured with sinus rhythm. Short runs of SVT 4-6 beats.  Hypertension.  Venous duplex, 05/03/2023: Chronic right lower extremity deep vein thrombosis noted in the gastrocnemius. Chronic right lower extremity superficial thrombophlebitis noted in the small saphenous. Chronic left lower extremity deep vein thrombosis noted in the gastrocnemius. Sub-acute left lower extremity deep vein thrombosis noted in the distal femoral. There was deep venous valvular incompetence noted in the right popliteal.  Dyslipidemia.  DVT x 2 (circa 2002).  On chronic warfarin therapy.  TIA.  Macular degeneration.  Pseudoxanthoma elasticum.  Hypothyroidism.  Osteoporosis.    History of Present Illness  Patient presents today for a 6 month follow-up with a history of nonrheumatic AVS, PVD, and cardiac risk factors. Since last visit, patient has been doing well overall from a cardiovascular standpoint. She reports recent episodes of left-sided chest pain and shoulder pain that occur almost every night.  States that this is a deep inside pain and does not worsen with movement.  She does not have similar pain with activities during the day.  No worsening of edema, no orthopnea PND palpitation or syncope.      No Known Allergies      Current Outpatient Medications:     atorvastatin (LIPITOR) 20 MG tablet, Take 1 tablet by mouth Daily., Disp: , Rfl: 3    calcium carbonate-vitamin d 600-400 MG-UNIT per tablet, Take 2 tablets by mouth Daily., Disp: , Rfl:     carvedilol (COREG) 25 MG tablet, Take 1 tablet by mouth 2 (Two) Times a Day., Disp: 180 tablet, Rfl: 3    cilostazol (PLETAL) 50 MG tablet, Take 1 tablet by mouth 2 (Two) Times a Day Before Meals., Disp: 180 tablet, Rfl: 3    ezetimibe (ZETIA) 10 MG tablet, Take 1 tablet by mouth Daily., Disp: , Rfl: 2    ferrous sulfate 325 (65 FE) MG tablet, Take 1 tablet by mouth Daily. 1 tablet Daily, Disp: , Rfl:      "lansoprazole (PREVACID) 30 MG capsule, Take 1 capsule by mouth Daily., Disp: , Rfl:     levothyroxine (SYNTHROID, LEVOTHROID) 75 MCG tablet, Take 1 tablet by mouth Daily., Disp: , Rfl: 3    losartan-hydrochlorothiazide (Hyzaar) 100-25 MG per tablet, Take 1 tablet by mouth Daily., Disp: 90 tablet, Rfl: 3    Multiple Vitamin (MULTI-VITAMIN DAILY PO), Take 1 tablet by mouth Daily., Disp: , Rfl:     Multiple Vitamins-Minerals (OCUVITE ADULT FORMULA PO), Take 1 tablet by mouth Daily., Disp: , Rfl:     vitamin B-12 (CYANOCOBALAMIN) 1000 MCG tablet, Take 2 tablets by mouth Daily., Disp: , Rfl:     Vitamin D, Cholecalciferol, 1000 units capsule, Take 2 capsules by mouth Daily., Disp: , Rfl:     vitamin E 400 UNIT capsule, Take 1 capsule by mouth Daily., Disp: , Rfl:     warfarin (COUMADIN) 5 MG tablet, Take 1 tablet by mouth Daily., Disp: , Rfl:     The following portions of the patient's history were reviewed and updated as appropriate: allergies, current medications, past family history, past medical history, past social history, past surgical history and problem list.    ROS  Review of Systems   14 point ROS negative except for that listed in the HPI.         Objective:     /70 (BP Location: Left arm, Patient Position: Sitting)   Pulse 75   Ht 162.6 cm (64\")   Wt 65.8 kg (145 lb)   SpO2 98%   BMI 24.89 kg/m²      Physical Exam  Constitutional: Patient appears well-developed and well-nourished.   HENT: HEENT exam unremarkable.   Neck: Neck supple. No JVD present. No carotid bruits.   Cardiovascular: Normal rate, regular rhythm and normal heart sounds.  3/6 systolic ejection murmur.  2+ symmetric pulses.   Pulmonary/Chest: Breath sounds normal. Does not exhibit tenderness.   Abdominal: Abdomen benign.   Musculoskeletal: No significant edema.  Neurological: Neurological exam unremarkable.   Vitals reviewed.    Data Review:   No recent laboratory studies available for review today.     Procedures       Advance " Care Planning   ACP discussion was declined by the patient. Patient does not have an advance directive, declines further assistance.           Assessment:      Diagnosis Plan   1. Nonrheumatic aortic valve stenosis this has significantly progressed with associated symptoms of angina. Recent echo shows severe aortic valve stenosis. TAVR process was discussed, scheduling to start, see details below..       2. Peripheral vascular disease  Stable and asymptomatic.  Continue Pletal.      3. Essential hypertension  Acceptable control, continue on carvedilol 25 mg BID and losartan 100-25 mg daily for hypertension.  Patient states that her blood pressure was running low and she is taking half tablet of losartan HCTZ.      4. Dyslipidemia  No labs for review today. Continue atorvastatin 20 mg and zetia 10 mg daily for dyslipidemia.         Plan:   Patient now has severe aortic stenosis with associated symptoms of angina.  She appears to be a reasonable candidate for TAVR, procedure discussed.  Goals of care were discussed with the patient.  Shared decision-making approach was used as we discussed the patient's treatment options for severe symptomatic aortic stenosis.  Treatment options include TAVR, SAVR and medical management.  Risks and benefits of transcatheter aortic valve replacement (TAVR) versus surgical aortic valve replacement (SAVR) were discussed.  Risks include but are not limited to death, stroke, bleeding, vascular injury, heart rhythm disturbance (including possible need for permanent pacemaker), infection, heart attack, kidney failure and other organ failure.   Schedule cardiac cath and DORIS, subsequently we will ask the heart valve coordinator to see the patient and set up appointment with CT surgery.  She will also need a CTA.  Continue current medications.  Hold warfarin for 4 days before procedure.  FU after procedures, sooner as needed.  Thank you for allowing us to participate in the care of your  patient.     Scribed for Carolin Green MD by hPi Winter. 12/13/2024  10:43 EST    I, Carolin Green MD, personally performed the services described in this documentation as scribed by the above named individual in my presence, and it is both accurate and complete.  12/13/2024  11:14 EST      Please note that portions of this note may have been completed with a voice recognition program. Efforts were made to edit the dictations, but occasionally words are mistranscribed.

## 2024-12-13 ENCOUNTER — LAB (OUTPATIENT)
Dept: LAB | Facility: HOSPITAL | Age: 89
End: 2024-12-13
Payer: MEDICARE

## 2024-12-13 ENCOUNTER — HOSPITAL ENCOUNTER (OUTPATIENT)
Dept: CARDIOLOGY | Facility: HOSPITAL | Age: 89
Discharge: HOME | End: 2024-12-13
Payer: MEDICARE

## 2024-12-13 ENCOUNTER — OFFICE VISIT (OUTPATIENT)
Dept: CARDIOLOGY | Facility: CLINIC | Age: 89
End: 2024-12-13
Payer: MEDICARE

## 2024-12-13 VITALS
HEIGHT: 64 IN | SYSTOLIC BLOOD PRESSURE: 142 MMHG | HEART RATE: 75 BPM | OXYGEN SATURATION: 98 % | DIASTOLIC BLOOD PRESSURE: 70 MMHG | BODY MASS INDEX: 24.75 KG/M2 | WEIGHT: 145 LBS

## 2024-12-13 VITALS — WEIGHT: 144 LBS | BODY MASS INDEX: 24.59 KG/M2 | HEIGHT: 64 IN

## 2024-12-13 DIAGNOSIS — I35.0 NONRHEUMATIC AORTIC VALVE STENOSIS: ICD-10-CM

## 2024-12-13 DIAGNOSIS — I10 ESSENTIAL HYPERTENSION: ICD-10-CM

## 2024-12-13 DIAGNOSIS — I73.9 PERIPHERAL VASCULAR DISEASE: ICD-10-CM

## 2024-12-13 DIAGNOSIS — E78.5 DYSLIPIDEMIA: ICD-10-CM

## 2024-12-13 DIAGNOSIS — I35.0 NONRHEUMATIC AORTIC VALVE STENOSIS: Primary | ICD-10-CM

## 2024-12-13 LAB
ALBUMIN SERPL-MCNC: 3.5 G/DL (ref 3.5–5.2)
ALBUMIN/GLOB SERPL: 1.4 G/DL
ALP SERPL-CCNC: 69 U/L (ref 39–117)
ALT SERPL W P-5'-P-CCNC: 17 U/L (ref 1–33)
ANION GAP SERPL CALCULATED.3IONS-SCNC: 11.1 MMOL/L (ref 5–15)
ASCENDING AORTA: 4.1 CM
AST SERPL-CCNC: 25 U/L (ref 1–32)
BASOPHILS # BLD AUTO: 0.04 10*3/MM3 (ref 0–0.2)
BASOPHILS NFR BLD AUTO: 0.6 % (ref 0–1.5)
BH CV ECHO MEAS - AO MAX PG: 73.9 MMHG
BH CV ECHO MEAS - AO MEAN PG: 47 MMHG
BH CV ECHO MEAS - AO ROOT DIAM: 3.1 CM
BH CV ECHO MEAS - AO V2 MAX: 429.8 CM/SEC
BH CV ECHO MEAS - AO V2 VTI: 114.3 CM
BH CV ECHO MEAS - AVA(I,D): 0.8 CM2
BH CV ECHO MEAS - EF(MOD-BP): 51.5 %
BH CV ECHO MEAS - IVS/LVPW: 0.91 CM
BH CV ECHO MEAS - IVSD: 1 CM
BH CV ECHO MEAS - LA DIMENSION: 3.6 CM
BH CV ECHO MEAS - LAT PEAK E' VEL: 7.1 CM/SEC
BH CV ECHO MEAS - LV MAX PG: 5.1 MMHG
BH CV ECHO MEAS - LV MEAN PG: 2.8 MMHG
BH CV ECHO MEAS - LV V1 MAX: 113.3 CM/SEC
BH CV ECHO MEAS - LV V1 VTI: 31.6 CM
BH CV ECHO MEAS - LVIDD: 4.3 CM
BH CV ECHO MEAS - LVIDS: 2 CM
BH CV ECHO MEAS - LVOT DIAM: 2 CM
BH CV ECHO MEAS - LVPWD: 1.1 CM
BH CV ECHO MEAS - MED PEAK E' VEL: 5.7 CM/SEC
BH CV ECHO MEAS - MV A MAX VEL: 115.8 CM/SEC
BH CV ECHO MEAS - MV E MAX VEL: 122.7 CM/SEC
BH CV ECHO MEAS - MV E/A: 1.06
BH CV ECHO MEAS - MV MAX PG: 7.2 MMHG
BH CV ECHO MEAS - MV MEAN PG: 3.7 MMHG
BH CV ECHO MEAS - MV P1/2T: 66 MSEC
BH CV ECHO MEAS - MV V2 VTI: 3.6 CM
BH CV ECHO MEAS - MVA(P1/2T): 3.3 CM2
BH CV ECHO MEAS - PA ACC TIME: 0.12 SEC
BH CV ECHO MEAS - RAP SYSTOLE: 3 MMHG
BH CV ECHO MEAS - RVSP: 29 MMHG
BH CV ECHO MEAS - TAPSE (>1.6): 1.16 CM
BH CV ECHO MEAS - TR MAX PG: 26.3 MMHG
BH CV ECHO MEAS - TR MAX VEL: 251.2 CM/SEC
BH CV ECHO MEASUREMENTS AVERAGE E/E' RATIO: 19.17
BH CV VAS BP LEFT ARM: NORMAL MMHG
BH CV XLRA - RV BASE: 2.7 CM
BH CV XLRA - RV LENGTH: 5.8 CM
BH CV XLRA - RV MID: 2.02 CM
BH CV XLRA - TDI S': 19.1 CM/SEC
BILIRUB SERPL-MCNC: 0.6 MG/DL (ref 0–1.2)
BUN SERPL-MCNC: 15 MG/DL (ref 8–23)
BUN/CREAT SERPL: 20.8 (ref 7–25)
CALCIUM SPEC-SCNC: 9.3 MG/DL (ref 8.6–10.5)
CHLORIDE SERPL-SCNC: 102 MMOL/L (ref 98–107)
CO2 SERPL-SCNC: 26.9 MMOL/L (ref 22–29)
CREAT SERPL-MCNC: 0.72 MG/DL (ref 0.57–1)
DEPRECATED RDW RBC AUTO: 44.5 FL (ref 37–54)
EGFRCR SERPLBLD CKD-EPI 2021: 80.5 ML/MIN/1.73
EOSINOPHIL # BLD AUTO: 0.14 10*3/MM3 (ref 0–0.4)
EOSINOPHIL NFR BLD AUTO: 2 % (ref 0.3–6.2)
ERYTHROCYTE [DISTWIDTH] IN BLOOD BY AUTOMATED COUNT: 13.1 % (ref 12.3–15.4)
GLOBULIN UR ELPH-MCNC: 2.5 GM/DL
GLUCOSE SERPL-MCNC: 91 MG/DL (ref 65–99)
HCT VFR BLD AUTO: 36.3 % (ref 34–46.6)
HGB BLD-MCNC: 11.3 G/DL (ref 12–15.9)
IMM GRANULOCYTES # BLD AUTO: 0.01 10*3/MM3 (ref 0–0.05)
IMM GRANULOCYTES NFR BLD AUTO: 0.1 % (ref 0–0.5)
LEFT ATRIUM VOLUME INDEX: 45.1 ML/M2
LYMPHOCYTES # BLD AUTO: 3.56 10*3/MM3 (ref 0.7–3.1)
LYMPHOCYTES NFR BLD AUTO: 50.4 % (ref 19.6–45.3)
MCH RBC QN AUTO: 29.3 PG (ref 26.6–33)
MCHC RBC AUTO-ENTMCNC: 31.1 G/DL (ref 31.5–35.7)
MCV RBC AUTO: 94 FL (ref 79–97)
MONOCYTES # BLD AUTO: 0.37 10*3/MM3 (ref 0.1–0.9)
MONOCYTES NFR BLD AUTO: 5.2 % (ref 5–12)
NEUTROPHILS NFR BLD AUTO: 2.95 10*3/MM3 (ref 1.7–7)
NEUTROPHILS NFR BLD AUTO: 41.7 % (ref 42.7–76)
NRBC BLD AUTO-RTO: 0 /100 WBC (ref 0–0.2)
PLATELET # BLD AUTO: 164 10*3/MM3 (ref 140–450)
PMV BLD AUTO: 10.5 FL (ref 6–12)
POTASSIUM SERPL-SCNC: 3.6 MMOL/L (ref 3.5–5.2)
PROT SERPL-MCNC: 6 G/DL (ref 6–8.5)
RBC # BLD AUTO: 3.86 10*6/MM3 (ref 3.77–5.28)
SODIUM SERPL-SCNC: 140 MMOL/L (ref 136–145)
WBC NRBC COR # BLD AUTO: 7.07 10*3/MM3 (ref 3.4–10.8)

## 2024-12-13 PROCEDURE — 1160F RVW MEDS BY RX/DR IN RCRD: CPT | Performed by: INTERNAL MEDICINE

## 2024-12-13 PROCEDURE — 85025 COMPLETE CBC W/AUTO DIFF WBC: CPT

## 2024-12-13 PROCEDURE — 99214 OFFICE O/P EST MOD 30 MIN: CPT | Performed by: INTERNAL MEDICINE

## 2024-12-13 PROCEDURE — 93306 TTE W/DOPPLER COMPLETE: CPT

## 2024-12-13 PROCEDURE — 36415 COLL VENOUS BLD VENIPUNCTURE: CPT

## 2024-12-13 PROCEDURE — 80053 COMPREHEN METABOLIC PANEL: CPT

## 2024-12-13 PROCEDURE — 1159F MED LIST DOCD IN RCRD: CPT | Performed by: INTERNAL MEDICINE

## 2024-12-15 ENCOUNTER — PREP FOR SURGERY (OUTPATIENT)
Dept: OTHER | Facility: HOSPITAL | Age: 89
End: 2024-12-15
Payer: MEDICARE

## 2024-12-15 DIAGNOSIS — I38 VALVULAR HEART DISEASE: Primary | ICD-10-CM

## 2024-12-15 RX ORDER — ASPIRIN 81 MG/1
324 TABLET, CHEWABLE ORAL ONCE
OUTPATIENT
Start: 2024-12-15 | End: 2024-12-15

## 2024-12-15 RX ORDER — ACETAMINOPHEN 325 MG/1
650 TABLET ORAL EVERY 4 HOURS PRN
OUTPATIENT
Start: 2024-12-15

## 2024-12-15 RX ORDER — ASPIRIN 81 MG/1
81 TABLET ORAL DAILY
OUTPATIENT
Start: 2024-12-16

## 2024-12-15 RX ORDER — NITROGLYCERIN 0.4 MG/1
0.4 TABLET SUBLINGUAL
OUTPATIENT
Start: 2024-12-15

## 2024-12-15 RX ORDER — SODIUM CHLORIDE 0.9 % (FLUSH) 0.9 %
10 SYRINGE (ML) INJECTION AS NEEDED
OUTPATIENT
Start: 2024-12-15

## 2024-12-15 RX ORDER — SODIUM CHLORIDE 0.9 % (FLUSH) 0.9 %
10 SYRINGE (ML) INJECTION EVERY 12 HOURS SCHEDULED
OUTPATIENT
Start: 2024-12-15

## 2024-12-15 RX ORDER — SODIUM CHLORIDE 9 MG/ML
40 INJECTION, SOLUTION INTRAVENOUS AS NEEDED
OUTPATIENT
Start: 2024-12-15

## 2024-12-23 ENCOUNTER — HOSPITAL ENCOUNTER (OUTPATIENT)
Facility: HOSPITAL | Age: 89
Setting detail: HOSPITAL OUTPATIENT SURGERY
Discharge: HOME | End: 2024-12-23
Attending: INTERNAL MEDICINE | Admitting: INTERNAL MEDICINE
Payer: MEDICARE

## 2024-12-23 ENCOUNTER — HOSPITAL ENCOUNTER (OUTPATIENT)
Dept: CARDIOLOGY | Facility: HOSPITAL | Age: 89
Discharge: HOME | End: 2024-12-23
Payer: MEDICARE

## 2024-12-23 VITALS
OXYGEN SATURATION: 95 % | HEIGHT: 64 IN | BODY MASS INDEX: 24.09 KG/M2 | SYSTOLIC BLOOD PRESSURE: 172 MMHG | WEIGHT: 141.09 LBS | DIASTOLIC BLOOD PRESSURE: 94 MMHG | TEMPERATURE: 97 F | HEART RATE: 74 BPM | RESPIRATION RATE: 16 BRPM

## 2024-12-23 VITALS — WEIGHT: 141.09 LBS | BODY MASS INDEX: 24.09 KG/M2 | HEIGHT: 64 IN

## 2024-12-23 DIAGNOSIS — I35.0 NONRHEUMATIC AORTIC VALVE STENOSIS: ICD-10-CM

## 2024-12-23 DIAGNOSIS — I38 VALVULAR HEART DISEASE: ICD-10-CM

## 2024-12-23 DIAGNOSIS — I35.0 NONRHEUMATIC AORTIC VALVE STENOSIS: Primary | ICD-10-CM

## 2024-12-23 DIAGNOSIS — I73.9 PERIPHERAL VASCULAR DISEASE: ICD-10-CM

## 2024-12-23 LAB
AV MEAN PRESS GRAD SYS DOP V1V2: 40 MMHG
AV VMAX SYS DOP: 414 CM/SEC
BH CV ECHO MEAS - AO MAX PG: 68.6 MMHG
BH CV ECHO MEAS - AO V2 VTI: 103 CM
CHOLEST SERPL-MCNC: 119 MG/DL (ref 0–200)
HBA1C MFR BLD: 5.3 % (ref 4.8–5.6)
HDLC SERPL-MCNC: 66 MG/DL (ref 40–60)
INR PPP: 1.22 (ref 0.89–1.12)
LDLC SERPL CALC-MCNC: 42 MG/DL (ref 0–100)
LDLC/HDLC SERPL: 0.66 {RATIO}
LV EF 2D ECHO EST: 60 %
PROTHROMBIN TIME: 15.5 SECONDS (ref 12.2–14.5)
TRIGL SERPL-MCNC: 46 MG/DL (ref 0–150)
VLDLC SERPL-MCNC: 11 MG/DL (ref 5–40)

## 2024-12-23 PROCEDURE — 85610 PROTHROMBIN TIME: CPT | Performed by: INTERNAL MEDICINE

## 2024-12-23 PROCEDURE — C1894 INTRO/SHEATH, NON-LASER: HCPCS | Performed by: INTERNAL MEDICINE

## 2024-12-23 PROCEDURE — 83036 HEMOGLOBIN GLYCOSYLATED A1C: CPT | Performed by: INTERNAL MEDICINE

## 2024-12-23 PROCEDURE — C1769 GUIDE WIRE: HCPCS | Performed by: INTERNAL MEDICINE

## 2024-12-23 PROCEDURE — 25010000002 LIDOCAINE PF 1% 1 % SOLUTION: Performed by: INTERNAL MEDICINE

## 2024-12-23 PROCEDURE — 25810000003 SODIUM CHLORIDE 0.9 % SOLUTION: Performed by: NURSE PRACTITIONER

## 2024-12-23 PROCEDURE — 80061 LIPID PANEL: CPT | Performed by: NURSE PRACTITIONER

## 2024-12-23 PROCEDURE — 93454 CORONARY ARTERY ANGIO S&I: CPT | Performed by: INTERNAL MEDICINE

## 2024-12-23 PROCEDURE — 25510000001 IOPAMIDOL PER 1 ML: Performed by: INTERNAL MEDICINE

## 2024-12-23 PROCEDURE — 93312 ECHO TRANSESOPHAGEAL: CPT

## 2024-12-23 PROCEDURE — 99152 MOD SED SAME PHYS/QHP 5/>YRS: CPT

## 2024-12-23 PROCEDURE — 93321 DOPPLER ECHO F-UP/LMTD STD: CPT

## 2024-12-23 PROCEDURE — 36415 COLL VENOUS BLD VENIPUNCTURE: CPT

## 2024-12-23 PROCEDURE — 99214 OFFICE O/P EST MOD 30 MIN: CPT | Performed by: PHYSICIAN ASSISTANT

## 2024-12-23 PROCEDURE — 93325 DOPPLER ECHO COLOR FLOW MAPG: CPT

## 2024-12-23 PROCEDURE — 25010000002 MIDAZOLAM PER 1 MG: Performed by: INTERNAL MEDICINE

## 2024-12-23 RX ORDER — NITROGLYCERIN 0.4 MG/1
0.4 TABLET SUBLINGUAL
Status: DISCONTINUED | OUTPATIENT
Start: 2024-12-23 | End: 2024-12-23 | Stop reason: HOSPADM

## 2024-12-23 RX ORDER — ACETAMINOPHEN 325 MG/1
650 TABLET ORAL EVERY 4 HOURS PRN
Status: DISCONTINUED | OUTPATIENT
Start: 2024-12-23 | End: 2024-12-23 | Stop reason: HOSPADM

## 2024-12-23 RX ORDER — FENTANYL CITRATE 50 UG/ML
50-100 INJECTION, SOLUTION INTRAMUSCULAR; INTRAVENOUS ONCE AS NEEDED
Status: DISCONTINUED | OUTPATIENT
Start: 2024-12-23 | End: 2024-12-23

## 2024-12-23 RX ORDER — ETOMIDATE 2 MG/ML
INJECTION INTRAVENOUS
Status: DISCONTINUED
Start: 2024-12-23 | End: 2024-12-23 | Stop reason: HOSPADM

## 2024-12-23 RX ORDER — MIDAZOLAM HYDROCHLORIDE 1 MG/ML
2-20 INJECTION, SOLUTION INTRAMUSCULAR; INTRAVENOUS ONCE AS NEEDED
Status: DISCONTINUED | OUTPATIENT
Start: 2024-12-23 | End: 2024-12-23 | Stop reason: HOSPADM

## 2024-12-23 RX ORDER — SODIUM CHLORIDE 9 MG/ML
100 INJECTION, SOLUTION INTRAVENOUS ONCE AS NEEDED
Status: ACTIVE | OUTPATIENT
Start: 2024-12-23 | End: 2024-12-23

## 2024-12-23 RX ORDER — LIDOCAINE HYDROCHLORIDE 10 MG/ML
INJECTION, SOLUTION EPIDURAL; INFILTRATION; INTRACAUDAL; PERINEURAL
Status: DISCONTINUED | OUTPATIENT
Start: 2024-12-23 | End: 2024-12-23 | Stop reason: HOSPADM

## 2024-12-23 RX ORDER — ASPIRIN 81 MG/1
324 TABLET, CHEWABLE ORAL ONCE
Status: COMPLETED | OUTPATIENT
Start: 2024-12-23 | End: 2024-12-23

## 2024-12-23 RX ORDER — ETOMIDATE 2 MG/ML
0.3 INJECTION INTRAVENOUS AS NEEDED
Status: DISCONTINUED | OUTPATIENT
Start: 2024-12-23 | End: 2024-12-23 | Stop reason: HOSPADM

## 2024-12-23 RX ORDER — SODIUM CHLORIDE 0.9 % (FLUSH) 0.9 %
10 SYRINGE (ML) INJECTION EVERY 12 HOURS SCHEDULED
Status: DISCONTINUED | OUTPATIENT
Start: 2024-12-23 | End: 2024-12-23 | Stop reason: HOSPADM

## 2024-12-23 RX ORDER — IOPAMIDOL 755 MG/ML
INJECTION, SOLUTION INTRAVASCULAR
Status: DISCONTINUED | OUTPATIENT
Start: 2024-12-23 | End: 2024-12-23 | Stop reason: HOSPADM

## 2024-12-23 RX ORDER — MIDAZOLAM HYDROCHLORIDE 1 MG/ML
INJECTION, SOLUTION INTRAMUSCULAR; INTRAVENOUS
Status: DISCONTINUED
Start: 2024-12-23 | End: 2024-12-23 | Stop reason: HOSPADM

## 2024-12-23 RX ORDER — MIDAZOLAM HYDROCHLORIDE 1 MG/ML
INJECTION, SOLUTION INTRAMUSCULAR; INTRAVENOUS
Status: COMPLETED | OUTPATIENT
Start: 2024-12-23 | End: 2024-12-23

## 2024-12-23 RX ORDER — NALOXONE HCL 0.4 MG/ML
0.4 VIAL (ML) INJECTION ONCE AS NEEDED
Status: DISCONTINUED | OUTPATIENT
Start: 2024-12-23 | End: 2024-12-23 | Stop reason: HOSPADM

## 2024-12-23 RX ORDER — FENTANYL CITRATE 50 UG/ML
50-200 INJECTION, SOLUTION INTRAMUSCULAR; INTRAVENOUS ONCE AS NEEDED
Status: DISCONTINUED | OUTPATIENT
Start: 2024-12-23 | End: 2024-12-23 | Stop reason: HOSPADM

## 2024-12-23 RX ORDER — ASPIRIN 81 MG/1
81 TABLET ORAL DAILY
Status: DISCONTINUED | OUTPATIENT
Start: 2024-12-24 | End: 2024-12-23 | Stop reason: HOSPADM

## 2024-12-23 RX ORDER — SODIUM CHLORIDE 0.9 % (FLUSH) 0.9 %
10 SYRINGE (ML) INJECTION AS NEEDED
Status: DISCONTINUED | OUTPATIENT
Start: 2024-12-23 | End: 2024-12-23 | Stop reason: HOSPADM

## 2024-12-23 RX ORDER — AMLODIPINE BESYLATE 5 MG/1
5 TABLET ORAL ONCE
Status: COMPLETED | OUTPATIENT
Start: 2024-12-23 | End: 2024-12-23

## 2024-12-23 RX ORDER — FENTANYL CITRATE 50 UG/ML
INJECTION, SOLUTION INTRAMUSCULAR; INTRAVENOUS
Status: DISCONTINUED
Start: 2024-12-23 | End: 2024-12-23 | Stop reason: WASHOUT

## 2024-12-23 RX ORDER — FLUMAZENIL 0.1 MG/ML
0.5 INJECTION INTRAVENOUS ONCE AS NEEDED
Status: DISCONTINUED | OUTPATIENT
Start: 2024-12-23 | End: 2024-12-23 | Stop reason: HOSPADM

## 2024-12-23 RX ORDER — ETOMIDATE 2 MG/ML
INJECTION INTRAVENOUS
Status: COMPLETED | OUTPATIENT
Start: 2024-12-23 | End: 2024-12-23

## 2024-12-23 RX ORDER — SODIUM CHLORIDE 9 MG/ML
40 INJECTION, SOLUTION INTRAVENOUS AS NEEDED
Status: DISCONTINUED | OUTPATIENT
Start: 2024-12-23 | End: 2024-12-23 | Stop reason: HOSPADM

## 2024-12-23 RX ADMIN — ETOMIDATE 6 MG: 40 INJECTION, SOLUTION INTRAVENOUS at 08:34

## 2024-12-23 RX ADMIN — ASPIRIN 81 MG 324 MG: 81 TABLET ORAL at 08:19

## 2024-12-23 RX ADMIN — MIDAZOLAM HYDROCHLORIDE 2 MG: 1 INJECTION, SOLUTION INTRAMUSCULAR; INTRAVENOUS at 08:34

## 2024-12-23 RX ADMIN — AMLODIPINE BESYLATE 5 MG: 5 TABLET ORAL at 10:39

## 2024-12-23 RX ADMIN — SODIUM CHLORIDE 197.4 ML: 9 INJECTION, SOLUTION INTRAVENOUS at 08:34

## 2024-12-23 RX ADMIN — NITROGLYCERIN 1 INCH: 20 OINTMENT TOPICAL at 10:39

## 2024-12-23 NOTE — CONSULTS
CTS Consult    Patient Care Team:  Emmett Martines MD as PCP - General (Internal Medicine)  Carolin Green MD as Consulting Physician (Cardiology)  Sierra Leroy PA-C as Physician Assistant (Physician Assistant)      Reason for Consult:  Severe aortic stenosis    HPI  Patient is a 89 y.o. female with a history of peripheral vascular disease, hypertension, dyslipidemia, TIA, DVT x2 in 2002 on warfarin, hypothyroidism, macular degeneration and aortic valve stenosis who presented with episodes of left sided chest pain and shoulder pain.  She additionally reports worsening dyspnea on exertion which has limited her ability to perform her daily activities.  Patient has known aortic valve stenosis followed by TTE since 2018.  TTE on 12/13 revealed EF 51% with moderately increased LA volume and severe aortic valve stenosis with mean gradient of 47 mmHg, mild aortic insufficiency and mild mitral valve regurgitation.  Patient underwent cardiac catheterization today with no significant coronary artery disease.  We have been asked to evaluate for TAVR procedure.   She reports family history of valvular disease.  No smoking history or alcohol use.  No history of chest trauma or radiation.  No prior cardiac or known interventions.  She reports prior inguinal hernia repair, hysterectomy, and possible sympathectomy many years ago in [place].     Review of Systems  Constitutional: Negative for malaise/fatigue.  Negative for chills, fever, night sweats and weight loss.  HENT: Negative for hearing loss, odynophagia and sore throat.    Cardiovascular: Positive for chest pain  Respiratory: Negative for shortness of breath.  Negative for cough and hemoptysis.  Endocrine:  Negative for cold intolerance, heat intolerance, polydipsia, polyphagia and polyuria.  Hematologic/Lymphatic: Negative for easy bruising/bleeding.  Musculoskeletal: Negative for joint pain, joint swelling and myalgias.  Gastrointestinal: Negative for abdominal  pain, constipation, diarrhea, hematemesis, hematochezia, melena, nausea and vomiting.  Genitourinary: Negative for dysuria, frequency and hematuria.  Neurological: Negative for focal weakness, headaches, numbness and seizures.  Psychiatric/Behavioral: Negative for depression and suicidal ideas.  The patient is not nervous/anxious.    All other systems are reviewed and are negative.    History  Past Medical History:   Diagnosis Date    Degenerative disc disease, lumbar     Dyslipidemia     Hypertension     Hypothyroidism     Inguinal hernia     Macular degeneration     Osteoporosis     Peripheral vascular disease     Pseudoxanthoma elasticum     TIA (transient ischemic attack)      Past Surgical History:   Procedure Laterality Date    HYSTERECTOMY      INGUINAL HERNIA REPAIR Left     INNER EAR SURGERY Right     SYMPATHECTOMY      TONSILLECTOMY       Family History   Problem Relation Age of Onset    Hypertension Mother     Sick sinus syndrome Mother     Heart failure Mother     Atrial fibrillation Father     Hyperlipidemia Sister     Hypertension Sister     Hyperlipidemia Sister     Hypertension Sister     Hyperlipidemia Sister     Hypertension Sister     Hyperlipidemia Sister     Hypertension Sister     Valvular heart disease Brother      Social History     Tobacco Use    Smoking status: Never    Smokeless tobacco: Never   Vaping Use    Vaping status: Never Used   Substance Use Topics    Alcohol use: No    Drug use: No     Medications Prior to Admission   Medication Sig Dispense Refill Last Dose/Taking    atorvastatin (LIPITOR) 20 MG tablet Take 1 tablet by mouth Daily.  3 12/22/2024    calcium carbonate-vitamin d 600-400 MG-UNIT per tablet Take 1 tablet by mouth 2 (Two) Times a Day.   12/22/2024    carvedilol (COREG) 25 MG tablet Take 1 tablet by mouth 2 (Two) Times a Day. (Patient taking differently: Take  by mouth 2 (Two) Times a Day. 1/2 tab at NOON, AND 1 whole tab at QHS) 180 tablet 3 12/22/2024    cilostazol  (PLETAL) 50 MG tablet Take 1 tablet by mouth 2 (Two) Times a Day Before Meals. 180 tablet 3 12/22/2024    ezetimibe (ZETIA) 10 MG tablet Take 1 tablet by mouth Daily.  2 12/22/2024    ferrous sulfate 325 (65 FE) MG tablet Take 1 tablet by mouth Daily. 1 tablet Daily   12/22/2024    lansoprazole (PREVACID) 30 MG capsule Take 1 capsule by mouth Every Evening.   12/22/2024    levothyroxine (SYNTHROID, LEVOTHROID) 75 MCG tablet Take 1 tablet by mouth Daily. ON SUNDAY, TAKES 1.5 TABS  3 12/22/2024    losartan-hydrochlorothiazide (Hyzaar) 100-25 MG per tablet Take 1 tablet by mouth Daily. (Patient taking differently: Take 0.5 tablets by mouth Daily.) 90 tablet 3 12/23/2024 at  5:30 AM    Multiple Vitamin (MULTI-VITAMIN DAILY PO) Take 1 tablet by mouth Daily.   12/22/2024    Multiple Vitamins-Minerals (OCUVITE ADULT FORMULA PO) Take 1 tablet by mouth 2 (Two) Times a Day.   12/22/2024    vitamin B-12 (CYANOCOBALAMIN) 1000 MCG tablet Take 2 tablets by mouth Daily.   12/22/2024    Vitamin D, Cholecalciferol, 1000 units capsule Take 2 capsules by mouth Daily.   12/22/2024    vitamin E 400 UNIT capsule Take 1 capsule by mouth Daily.   12/22/2024    warfarin (COUMADIN) 5 MG tablet Take 1 tablet by mouth Daily.   12/18/2024     Allergies:  Patient has no known allergies.    Objective    Vital Signs  Temp:  [97 °F (36.1 °C)] 97 °F (36.1 °C)  Heart Rate:  [68-82] 73  Resp:  [16] 16  BP: (137-187)/(70-96) 155/78    Physical Exam:  General Appearance: alert, appears stated age and cooperative  Head: normocephalic, without obvious abnormality and atraumatic  Throat: gums healthy and no oral lesions  Neck: no adenopathy, supple, trachea midline, no thyromegaly  Lungs: clear to auscultation, respirations regular, respirations even and respirations unlabored  Heart: regular rhythm & normal rate, normal S1, S2,  + systolic murmur  Abdomen: normal bowel sounds, no masses and soft non-tender  Extremities: moves extremities well and no  "edema  Pulses: Palpable femoral pulses.  Pedal pulses nonpalpable.  Bilateral lower extremities warm, pink with adequate capillary refill  Skin: No ulcerations.  Right femoral arterial access for catheterization.   Neurologic: Mental Status orientated to person, place, time and situation          Data Review:          Coagulation:   INR   Date Value Ref Range Status   12/23/2024 1.22 (H) 0.89 - 1.12 Final     Cardiac markers:     ABGs:       Invalid input(s): \"PO2\"      Imaging Results (Last 72 Hours)       ** No results found for the last 72 hours. **              Cardiac catheterization 12/23/24:  Angiographic Findings:  Right coronary dominance.  LM: Minor irregularities, no significant disease.  LAD: Mild calcified plaque, no significant disease.  LCX: Minor irregularities, no significant disease.  RCA: Mild calcified plaque, no significant disease.      Echo 12/23/24:   Left Ventricle Left ventricular systolic function is normal. Estimated left ventricular EF = 60%     Normal left ventricular cavity size noted. Left ventricular wall thickness is consistent with mild concentric hypertrophy. All left ventricular wall segments contract normally.   Right Ventricle Normal right ventricular cavity size, wall thickness, systolic function and septal motion noted.   Left Atrium The left atrial cavity is moderately dilated.   Right Atrium The right atrial cavity is moderately dilated.   Aortic Valve The aortic valve is abnormal in structure. There is calcification of the aortic valve. No significant aortic valve regurgitation is present. Severe aortic valve stenosis is present. Peak velocity of the flow distal to the aortic valve is 414 cm/s. Aortic valve mean pressure gradient is 40.0 mmHg.   Mitral Valve Mitral annular calcification is present. There is calcification of the mitral valve. Mild mitral valve regurgitation is present.   Tricuspid Valve The tricuspid valve is grossly normal in structure. Trace tricuspid " valve regurgitation is present.   Pulmonic Valve The pulmonic valve is grossly normal in structure. There is trace pulmonic valve regurgitation present.   Greater Vessels No dilation of the aortic root is present. No dilation of the sinuses of Valsalva is present.   Pericardium The pericardium is normal. There is no evidence of pericardial effusion. .         Assessment:        Nonrheumatic aortic valve stenosis      Plan:  Preoperative workup for TAVR with CT TAVR protocol  Patient to discharge to home and follow up with Dr. Tao in the office.       Liz Almaraz PA-C  12/23/24  14:30 EST

## 2024-12-23 NOTE — INTERVAL H&P NOTE
"  H&P reviewed. The patient was examined and there are no changes to the H&P.      Vitals and Labs today:  Vitals:    12/23/24 0755 12/23/24 0758   BP: 166/77 159/81   BP Location: Right arm Left arm   Patient Position: Lying Lying   Pulse: 70    Resp: 16    Temp: 97 °F (36.1 °C)    TempSrc: Tympanic    SpO2: 97%        Lab Results   Component Value Date    WBC 7.07 12/13/2024    HGB 11.3 (L) 12/13/2024    HCT 36.3 12/13/2024    MCV 94.0 12/13/2024     12/13/2024     Lab Results   Component Value Date    GLUCOSE 91 12/13/2024    BUN 15 12/13/2024    CREATININE 0.72 12/13/2024     12/13/2024    K 3.6 12/13/2024     12/13/2024    CALCIUM 9.3 12/13/2024    PROTEINTOT 6.0 12/13/2024    ALBUMIN 3.5 12/13/2024    ALT 17 12/13/2024    AST 25 12/13/2024    ALKPHOS 69 12/13/2024    BILITOT 0.6 12/13/2024    GLOB 2.5 12/13/2024    AGRATIO 1.4 12/13/2024    BCR 20.8 12/13/2024    ANIONGAP 11.1 12/13/2024    EGFR 80.5 12/13/2024     No results found for: \"HGBA1C\"  No results found for: \"CHOL\", \"CHLPL\", \"TRIG\", \"HDL\", \"LDL\", \"LDLDIRECT\"        "

## 2024-12-23 NOTE — PROGRESS NOTES
Pt. Referred for Phase II Cardiac Rehab. Staff discussed benefits of exercise, program protocol, and educational material provided. Teach back verified. Staff will follow up with patient after possible TAVR.

## 2024-12-23 NOTE — CONSULTS
Diabetes Education    Patient Name:  Patricia Redmond  YOB: 1935  MRN: [id number]  Admit Date:  12/23/2024        Order criteria not met for diabetes education consult. Current A1c is 5.3, noted during chart review. Pt has no history of DM and no home meds for DM. Thank you.    Electronically signed by:  Luna Nicholas RN  12/23/24 10:11 EST

## 2024-12-23 NOTE — PROGRESS NOTES
Referral received from Dr. Green for severe aortic valve stenosis. Met with patient and daughter bedside in [facility]. She reports worsening VERDUZCO and fatigue that is limiting her ability to complete ADLs. Also limited by bilateral vision loss (only has significantly diminished vision with only a small portion of peripheral vision remaining) and claudication. Chronic LE edema, R>L for 'many years'. Anticipating TAVR to be able to complete ADLs without difficutly and remain as independent as possible.     We discussed AS, SAVR vs. TAVR, pre-procedural testing requirements, hospitalization and recovery expectations and cardiac rehab. Will need to finish workup process with CTA with TAVR protocol. If imaging is satisfactory and all are in agreement, will schedule TAVR. February/March TAVR date TBD. Educational folder reviewed and provided today, my contact information is included.     NYHA III  [facility] 43/70  5MWT- not completed S/P [facility], currently on bedrest

## 2024-12-30 ENCOUNTER — TELEPHONE (OUTPATIENT)
Dept: CARDIOLOGY | Facility: CLINIC | Age: 89
End: 2024-12-30
Payer: MEDICARE

## 2024-12-30 ENCOUNTER — TELEPHONE (OUTPATIENT)
Dept: CARDIAC SURGERY | Facility: CLINIC | Age: 89
End: 2024-12-30
Payer: MEDICARE

## 2024-12-30 NOTE — TELEPHONE ENCOUNTER
Pts daughter called states a week ago today her mother had a heart cath , noted a small groin nodule , no redness or drainage noted  at site,small in nature. Pt advised to alternate , heat with ice call back if increase in size or if becomes painful. Daughter verbalized understanding .

## 2024-12-30 NOTE — TELEPHONE ENCOUNTER
Patient's daughter called to report that patient is undergoing TAVR work-up currently. Patient just had heart cath done on 12/23/24 with Dr. Green. Patient's daughter reports that patient has a nodule-like area at the groin access site. She said that it was not painful. She wanted to know if she needed to contact cardiology or our office with this information. I advised that she contact Dr. Green's office to report this info in case they need patient to come in to have area evaluated. She verbalized understanding and agreed.

## 2025-01-10 ENCOUNTER — HOSPITAL ENCOUNTER (OUTPATIENT)
Facility: HOSPITAL | Age: 89
Discharge: HOME | End: 2025-01-10
Payer: MEDICARE

## 2025-01-10 VITALS
RESPIRATION RATE: 14 BRPM | SYSTOLIC BLOOD PRESSURE: 167 MMHG | HEART RATE: 68 BPM | TEMPERATURE: 97.7 F | WEIGHT: 143.85 LBS | BODY MASS INDEX: 24.56 KG/M2 | DIASTOLIC BLOOD PRESSURE: 79 MMHG | OXYGEN SATURATION: 98 % | HEIGHT: 64 IN

## 2025-01-10 DIAGNOSIS — I35.0 NONRHEUMATIC AORTIC VALVE STENOSIS: ICD-10-CM

## 2025-01-10 DIAGNOSIS — I35.0 NONRHEUMATIC AORTIC VALVE STENOSIS: Primary | ICD-10-CM

## 2025-01-10 DIAGNOSIS — I73.9 PERIPHERAL VASCULAR DISEASE: ICD-10-CM

## 2025-01-10 PROCEDURE — 71275 CT ANGIOGRAPHY CHEST: CPT

## 2025-01-10 PROCEDURE — 74174 CTA ABD&PLVS W/CONTRAST: CPT

## 2025-01-10 PROCEDURE — 25510000001 IOPAMIDOL PER 1 ML

## 2025-01-10 RX ORDER — SODIUM CHLORIDE 9 MG/ML
40 INJECTION, SOLUTION INTRAVENOUS AS NEEDED
Status: DISCONTINUED | OUTPATIENT
Start: 2025-01-10 | End: 2025-01-11 | Stop reason: HOSPADM

## 2025-01-10 RX ORDER — IOPAMIDOL 755 MG/ML
100 INJECTION, SOLUTION INTRAVASCULAR
Status: COMPLETED | OUTPATIENT
Start: 2025-01-10 | End: 2025-01-10

## 2025-01-10 RX ORDER — SODIUM CHLORIDE 0.9 % (FLUSH) 0.9 %
10 SYRINGE (ML) INJECTION EVERY 12 HOURS SCHEDULED
Status: DISCONTINUED | OUTPATIENT
Start: 2025-01-10 | End: 2025-01-11 | Stop reason: HOSPADM

## 2025-01-10 RX ORDER — METOPROLOL TARTRATE 25 MG/1
50 TABLET, FILM COATED ORAL ONCE AS NEEDED
Status: DISCONTINUED | OUTPATIENT
Start: 2025-01-10 | End: 2025-01-11 | Stop reason: HOSPADM

## 2025-01-10 RX ORDER — SODIUM CHLORIDE 0.9 % (FLUSH) 0.9 %
10 SYRINGE (ML) INJECTION AS NEEDED
Status: DISCONTINUED | OUTPATIENT
Start: 2025-01-10 | End: 2025-01-11 | Stop reason: HOSPADM

## 2025-01-10 RX ORDER — METOPROLOL TARTRATE 100 MG/1
100 TABLET ORAL ONCE AS NEEDED
Status: DISCONTINUED | OUTPATIENT
Start: 2025-01-10 | End: 2025-01-11 | Stop reason: HOSPADM

## 2025-01-10 RX ADMIN — IOPAMIDOL 100 ML: 755 INJECTION, SOLUTION INTRAVENOUS at 12:49

## 2025-01-16 ENCOUNTER — DOCUMENTATION (OUTPATIENT)
Dept: CARDIOLOGY | Facility: HOSPITAL | Age: 89
End: 2025-01-16
Payer: MEDICARE

## 2025-01-16 NOTE — PROGRESS NOTES
TAVR workup complete, [facility] team reviewing case/CTA images. If satisfactory and all are in agreement, will schedule TAVR. Tentative date: 2/10

## 2025-01-29 ENCOUNTER — TELEPHONE (OUTPATIENT)
Dept: CARDIAC SURGERY | Facility: CLINIC | Age: 89
End: 2025-01-29
Payer: MEDICARE

## 2025-01-29 NOTE — TELEPHONE ENCOUNTER
Called and left message with patient's daughter Jason to call back.  I initiated pre-authorization with insurance and they will not authorize without face to face (office visit evaluation) with our office with documentation that patient is TAVR candidate and the clinical information.  Need to schedule appointment for patient to be seen in office before 2/7/25.

## 2025-01-30 DIAGNOSIS — R09.89 CAROTID BRUIT, UNSPECIFIED LATERALITY: Primary | ICD-10-CM

## 2025-01-30 NOTE — PROGRESS NOTES
[facility] Cardiothoracic Surgery Office Follow Up Note     Date of Encounter: 2025     Name: Patricia Redmond  : 1935     Referred By: No ref. provider found  PCP: Emmett Martines MD    Chief Complaint:    Chief Complaint   Patient presents with    Follow-up     Hosp FU for Aortic Valve Stenosis-Complains of Fatigue and SOB and Lightheadedness       Subjective      History of Present Illness:    Patricia Redmond is a 89 y.o. female followed by Dr. Tao for aortic stenosis and PVD.  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  She is followed by her Cardiology, Dr. Green.  @ Cardiology Clinic visit 24, she reported angina.  Cardiac cath  and DORIS were arranged.  These studies demonstrated severe aortic stenosis w/ AV Vmax 4.1 m/sec, AV mean 40 mmHg, and aortic annulus 2.4 cm.  Cardiac cath demonstrated no hemodynamically significant CAD.  Patient was seen by CT Surgery service in CVOU department following cardiac cath.  The studies were reviewed and noted to be acceptable for TAVR candidacy.  Patient returns to outpatient CT surgery following completion of CT TAVR protocol and carotid duplex.  Patient reports fatigue and VERDUZCO but no syncope, chest pain, or orthopnea.      Review of Systems:  Review of Systems   Constitutional: Positive for malaise/fatigue. Negative for chills, decreased appetite, diaphoresis, fever, night sweats, weight gain and weight loss.   HENT:  Negative for hoarse voice.    Eyes:  Negative for blurred vision, double vision and visual disturbance.   Cardiovascular:  Positive for dyspnea on exertion and leg swelling. Negative for chest pain, claudication, irregular heartbeat, near-syncope, orthopnea, palpitations, paroxysmal nocturnal dyspnea and syncope.   Respiratory:  Negative for cough, hemoptysis, shortness of breath,  sputum production and wheezing.    Hematologic/Lymphatic: Negative for adenopathy and bleeding problem. Bruises/bleeds easily.   Skin:  Negative for color change, nail changes, poor wound healing and rash.   Musculoskeletal:  Positive for back pain. Negative for falls and muscle cramps.   Gastrointestinal:  Negative for abdominal pain, dysphagia and heartburn.   Genitourinary:  Negative for flank pain.   Neurological:  Positive for dizziness and light-headedness. Negative for brief paralysis, disturbances in coordination, focal weakness, headaches, loss of balance, numbness, paresthesias, sensory change, vertigo and weakness.   Psychiatric/Behavioral:  Negative for depression and suicidal ideas.    Allergic/Immunologic: Negative for persistent infections.       I have reviewed the following portions of the patient's history: problem list, current medications, allergies, past surgical history, past medical history, past social history, past family history, and ROS and confirm it's accurate.    Allergies:  No Known Allergies    Medications:      Current Outpatient Medications:     atorvastatin (LIPITOR) 20 MG tablet, Take 1 tablet by mouth Daily., Disp: , Rfl: 3    calcium carbonate-vitamin d 600-400 MG-UNIT per tablet, Take 1 tablet by mouth 2 (Two) Times a Day., Disp: , Rfl:     carvedilol (COREG) 25 MG tablet, Take 1 tablet by mouth 2 (Two) Times a Day. (Patient taking differently: Take  by mouth 2 (Two) Times a Day. 1/2 tab at NOON, AND 1 whole tab at QHS), Disp: 180 tablet, Rfl: 3    ezetimibe (ZETIA) 10 MG tablet, Take 1 tablet by mouth Daily., Disp: , Rfl: 2    ferrous sulfate 325 (65 FE) MG tablet, Take 1 tablet by mouth Daily. 1 tablet Daily, Disp: , Rfl:     lansoprazole (PREVACID) 30 MG capsule, Take 1 capsule by mouth Every Evening., Disp: , Rfl:     levothyroxine (SYNTHROID, LEVOTHROID) 75 MCG tablet, Take 1 tablet by mouth Daily. ON SUNDAY, TAKES 1.5 TABS, Disp: , Rfl: 3    losartan-hydrochlorothiazide  (Hyzaar) 100-25 MG per tablet, Take 1 tablet by mouth Daily. (Patient taking differently: Take 0.5 tablets by mouth Daily.), Disp: 90 tablet, Rfl: 3    Multiple Vitamin (MULTI-VITAMIN DAILY PO), Take 1 tablet by mouth Daily., Disp: , Rfl:     Multiple Vitamins-Minerals (OCUVITE ADULT FORMULA PO), Take 1 tablet by mouth 2 (Two) Times a Day., Disp: , Rfl:     vitamin B-12 (CYANOCOBALAMIN) 1000 MCG tablet, Take 2 tablets by mouth Daily., Disp: , Rfl:     Vitamin D, Cholecalciferol, 1000 units capsule, Take 2 capsules by mouth Daily., Disp: , Rfl:     vitamin E 400 UNIT capsule, Take 1 capsule by mouth Daily., Disp: , Rfl:     warfarin (COUMADIN) 5 MG tablet, Take 1 tablet by mouth Daily., Disp: , Rfl:     History:   Past Medical History:   Diagnosis Date    Aortic valve stenosis     Deep vein thrombosis     Degenerative disc disease, lumbar     Dyslipidemia     Hypertension     Hypothyroidism     Inguinal hernia     Macular degeneration     Osteoporosis     Peripheral vascular disease     Pseudoxanthoma elasticum     TIA (transient ischemic attack)        Past Surgical History:   Procedure Laterality Date    CARDIAC CATHETERIZATION N/A 12/23/2024    Procedure: Left Heart Cath - Left radial access;  Surgeon: Carolin Green MD;  Location: [facility] CATH INVASIVE LOCATION;  Service: Cardiovascular;  Laterality: N/A;    HYSTERECTOMY      INGUINAL HERNIA REPAIR Left     INNER EAR SURGERY Right     SYMPATHECTOMY      TONSILLECTOMY         Social History     Socioeconomic History    Marital status:     Number of children: 4   Tobacco Use    Smoking status: Never    Smokeless tobacco: Never   Vaping Use    Vaping status: Never Used   Substance and Sexual Activity    Alcohol use: No    Drug use: No    Sexual activity: Defer        Family History   Problem Relation Age of Onset    Hypertension Mother     Sick sinus syndrome Mother     Heart failure Mother     Atrial fibrillation Father     Hyperlipidemia Sister      "Hypertension Sister     Hyperlipidemia Sister     Hypertension Sister     Hyperlipidemia Sister     Hypertension Sister     Hyperlipidemia Sister     Hypertension Sister     Valvular heart disease Brother        Objective   Physical Exam:  Vitals:    02/04/25 1005 02/04/25 1006   BP: (!) 184/80 178/80   BP Location: Left arm Right arm   Patient Position: Sitting Sitting   Pulse: 71    Temp: 97.4 °F (36.3 °C)    SpO2: 99%    Weight: 65.8 kg (145 lb)    Height: 162.6 cm (64\")  Comment: patient reported       Body mass index is 24.89 kg/m².    Physical Exam  Vitals reviewed.   Constitutional:       General: She is not in acute distress.     Appearance: She is well-developed and well-groomed. She is not toxic-appearing.   HENT:      Head: Normocephalic and atraumatic.   Eyes:      General: Lids are normal.      Conjunctiva/sclera: Conjunctivae normal.      Pupils: Pupils are equal, round, and reactive to light.   Neck:      Vascular: No carotid bruit or JVD.   Cardiovascular:      Rate and Rhythm: Normal rate and regular rhythm.      Pulses:           Carotid pulses are 2+ on the right side and 2+ on the left side.       Radial pulses are 2+ on the right side and 2+ on the left side.        Femoral pulses are 2+ on the right side and 2+ on the left side.     Heart sounds: S1 normal and S2 normal. Murmur heard.      Systolic murmur is present with a grade of 3/6.   Pulmonary:      Effort: Pulmonary effort is normal. No respiratory distress.      Breath sounds: Normal breath sounds.   Musculoskeletal:         General: Normal range of motion.      Cervical back: Normal range of motion and neck supple.      Right lower leg: No edema.      Left lower leg: No edema.   Skin:     General: Skin is warm and dry.      Capillary Refill: Capillary refill takes less than 2 seconds.   Neurological:      General: No focal deficit present.      Mental Status: She is alert and oriented to person, place, and time.      GCS: GCS eye " subscore is 4. GCS verbal subscore is 5. GCS motor subscore is 6.   Psychiatric:         Attention and Perception: Attention normal.         Mood and Affect: Mood normal.         Speech: Speech normal.         Behavior: Behavior is cooperative.         Thought Content: Thought content normal.         Judgment: Judgment normal.         Imaging/Labs:  Carotid Duplex 2/4/25:  report pending.  No critical stenosis by velocity criteria.      CT Angio TAVR Chest Abdomen Pelvis: 1/10/2025 (Personally reviewed)  Impression:  1. CTA of the chest, abdomen and pelvis, with image data saved per TAVR protocol.   2. Dense calcification of the aortic valve leaflets. No evidence of thoracic aortic aneurysm or dissection. No evidence of significant aortoiliac luminal stenosis.   3. No other evidence of acute disease in the chest, abdomen or pelvis. Some chronic findings are present including mild chronic interstitial changes of the lung fields and moderate to large hiatal hernia with portion of the stomach intrathoracic.   Electronically Signed: Harvinder Vega MD  1/10/2025     DORIS 12/23/24 (Dr. Green) Interpretation Summary (Personally reviewed)    Left ventricular systolic function is normal. Estimated left ventricular EF = 60%    Left ventricular wall thickness is consistent with mild concentric hypertrophy.    Biatrial enlargement.    Severe aortic valve stenosis is present.  Peak velocity 414 cm/s, mean gradient 40 mmHg.  Aortic annulus 2.4 cm, STJ 2.9 cm.    Mild mitral regurgitation    Trace tricuspid regurgitation.    Cardiac Cath 12/23/24 (Dr. Green) Conclusion (Personally reviewed)  FINAL IMPRESSION:  No evidence of hemodynamic significant coronary artery disease.   RECOMMENDATIONS:  Proceed to further evaluation and scheduling for TAVR.   Indications: Severe symptomatic aortic stenosis, pre-AVR assessment of coronary anatomy.   Access: Right femoral.   Procedures:   Selective coronary angiography.    Angiographic  Findings:  Right coronary dominance.  LM: Minor irregularities, no significant disease.  LAD: Mild calcified plaque, no significant disease.  LCX: Minor irregularities, no significant disease.  RCA: Mild calcified plaque, no significant disease.   Complications: No acute procedure related complications.              Doppler Ankle Brachial Index Single Level 9/16/2024  Interpretation Summary    Right Conclusion: The right VENUS is moderately reduced.    Left Conclusion: The left VENUS is normal.      TTE 12/13/2024 Interpretation Summary     Left ventricular systolic function is normal. Calculated left ventricular EF = 51.5% Left ventricular ejection fraction appears to be 51 - 55%.    Left ventricular wall thickness is consistent with mild concentric hypertrophy.    Left atrial volume is moderately increased.    Severe aortic valve stenosis is present.  Mean gradient 47 mmHg, CARLTON 0.8 cm².  There is mild aortic insufficiency.    Mild to moderate mitral regurgitation.    Mild tricuspid regurgitation with normal RVSP.    Assessment / Plan      Assessment / Plan:  1. Nonrheumatic aortic valve stenosis   2. Peripheral vascular disease  - 89 y.o. female followed by Dr. Tao for aortic stenosis and PVD.    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    - She is followed by her Cardiology, Dr. Green.    - @ Clinic visit 12/13/24, patient reported angina.  Cardiac cath  and DORIS were arranged: severe aortic stenosis w/ AV Vmax 4.1 m/sec, AV mean 40 mmHg, aortic annulus 2.4 cm.  Cardiac cath demonstrated no hemodynamically significant CAD.    - Seen by CT Surgery (Dr. Tao) in CVOU department following cardiac cath: deemed to be acceptable for TAVR candidacy.    - Returns to outpatient CT surgery following completion of CT TAVR protocol and carotid duplex:  No critical carotid  stenosis, no other acute disease in the chest/abd/pelvis other than dense calcific aortic stenosis.   - High risk candidate for surgical AVR w/ STS mortality risk score 8.4%  - Reasonable candidate for TAVR w/ compensated HFpEF and good functional status  - W/ progressive aortic stenosis, recommend DC cilostazol.    - Continue warfarin, statin, BB, and Zetia.  Consider re-initiation cilostazol after recovered from TAVR  - Patient watched educational procedure video for TAVR procedure, we discussed post op expectations and procedure risks including death, MI, CVA, bleeding, renal dysfunction, pain, infection, and risk of PPM  - Patient/daughter verbalized understanding of procedure and procedure risks.  She wishes to proceed.      Patient Education: Risks, benefits, and alternatives were presented to the patient and through shared decision making, the patient elects to proceed with TAVR procedure per Dr. Tao.        Follow Up:   Return in about 6 weeks (around 3/18/2025) for post op TAVR procedure .   Or sooner for any further concerns or worsening sign and symptoms. If unable to reach us in the office please dial 911 or go to the nearest emergency department.      JOSEPH Shine  [facility] Cardiothoracic Surgery    Time Spent: I spent 48 minutes caring for Patricia on this date of service. This time includes time spent by me in the following activities: preparing for the visit, reviewing tests, obtaining and/or reviewing a separately obtained history, performing a medically appropriate examination and/or evaluation, counseling and educating the patient/family/caregiver, ordering medications, tests, or procedures, documenting information in the medical record, independently interpreting results and communicating that information with the patient/family/caregiver, and care coordination.

## 2025-02-03 ENCOUNTER — DOCUMENTATION (OUTPATIENT)
Dept: CARDIOLOGY | Facility: HOSPITAL | Age: 89
End: 2025-02-03
Payer: MEDICARE

## 2025-02-03 NOTE — PROGRESS NOTES
Dr. Green unavailable for case on 2/10, postpone procedure per MD. Next available with Dr. Green and Dr. Tao is 3/27. Will follow up after apt with CT surgery on 2/4 and insurance approval.

## 2025-02-04 ENCOUNTER — HOSPITAL ENCOUNTER (OUTPATIENT)
Dept: CARDIOLOGY | Facility: HOSPITAL | Age: 89
Discharge: HOME | End: 2025-02-04
Admitting: NURSE PRACTITIONER
Payer: MEDICARE

## 2025-02-04 ENCOUNTER — OFFICE VISIT (OUTPATIENT)
Dept: CARDIAC SURGERY | Facility: CLINIC | Age: 89
End: 2025-02-04
Payer: MEDICARE

## 2025-02-04 VITALS — HEIGHT: 64 IN | WEIGHT: 143 LBS | BODY MASS INDEX: 24.41 KG/M2

## 2025-02-04 VITALS
WEIGHT: 145 LBS | TEMPERATURE: 97.4 F | SYSTOLIC BLOOD PRESSURE: 178 MMHG | BODY MASS INDEX: 24.75 KG/M2 | HEART RATE: 71 BPM | DIASTOLIC BLOOD PRESSURE: 80 MMHG | OXYGEN SATURATION: 99 % | HEIGHT: 64 IN

## 2025-02-04 DIAGNOSIS — I35.0 NONRHEUMATIC AORTIC VALVE STENOSIS: Primary | ICD-10-CM

## 2025-02-04 DIAGNOSIS — R09.89 CAROTID BRUIT, UNSPECIFIED LATERALITY: ICD-10-CM

## 2025-02-04 DIAGNOSIS — I73.9 PERIPHERAL VASCULAR DISEASE: ICD-10-CM

## 2025-02-04 LAB
BH CV XLRA MEAS LEFT DIST CCA EDV: 11.2 CM/SEC
BH CV XLRA MEAS LEFT DIST CCA PSV: 61.5 CM/SEC
BH CV XLRA MEAS LEFT DIST ICA EDV: 21.1 CM/SEC
BH CV XLRA MEAS LEFT DIST ICA PSV: 83.1 CM/SEC
BH CV XLRA MEAS LEFT ICA/CCA RATIO: 2.08
BH CV XLRA MEAS LEFT MID CCA EDV: 11.6 CM/SEC
BH CV XLRA MEAS LEFT MID CCA PSV: 62.1 CM/SEC
BH CV XLRA MEAS LEFT MID ICA EDV: 31.3 CM/SEC
BH CV XLRA MEAS LEFT MID ICA PSV: 128.5 CM/SEC
BH CV XLRA MEAS LEFT PROX CCA EDV: 8.7 CM/SEC
BH CV XLRA MEAS LEFT PROX CCA PSV: 77 CM/SEC
BH CV XLRA MEAS LEFT PROX ECA EDV: 0 CM/SEC
BH CV XLRA MEAS LEFT PROX ECA PSV: 66.9 CM/SEC
BH CV XLRA MEAS LEFT PROX ICA EDV: 15.5 CM/SEC
BH CV XLRA MEAS LEFT PROX ICA PSV: 77 CM/SEC
BH CV XLRA MEAS LEFT PROX SCLA PSV: 108.7 CM/SEC
BH CV XLRA MEAS LEFT VERTEBRAL A PSV: 51 CM/SEC
BH CV XLRA MEAS RIGHT DIST CCA EDV: 14 CM/SEC
BH CV XLRA MEAS RIGHT DIST CCA PSV: 53.1 CM/SEC
BH CV XLRA MEAS RIGHT DIST ICA EDV: 29.2 CM/SEC
BH CV XLRA MEAS RIGHT DIST ICA PSV: 114.2 CM/SEC
BH CV XLRA MEAS RIGHT ICA/CCA RATIO: 2.67
BH CV XLRA MEAS RIGHT MID CCA EDV: 10.6 CM/SEC
BH CV XLRA MEAS RIGHT MID CCA PSV: 56.8 CM/SEC
BH CV XLRA MEAS RIGHT MID ICA EDV: 20.7 CM/SEC
BH CV XLRA MEAS RIGHT MID ICA PSV: 124.2 CM/SEC
BH CV XLRA MEAS RIGHT PROX CCA EDV: 7.3 CM/SEC
BH CV XLRA MEAS RIGHT PROX CCA PSV: 54.9 CM/SEC
BH CV XLRA MEAS RIGHT PROX ECA EDV: 7.8 CM/SEC
BH CV XLRA MEAS RIGHT PROX ECA PSV: 107.8 CM/SEC
BH CV XLRA MEAS RIGHT PROX ICA EDV: 22.9 CM/SEC
BH CV XLRA MEAS RIGHT PROX ICA PSV: 142.1 CM/SEC
BH CV XLRA MEAS RIGHT PROX SCLA PSV: 78.5 CM/SEC
BH CV XLRA MEAS RIGHT VERTEBRAL A PSV: 65.1 CM/SEC
LEFT ARM BP: NORMAL MMHG
RIGHT ARM BP: NORMAL MMHG

## 2025-02-04 PROCEDURE — 93880 EXTRACRANIAL BILAT STUDY: CPT | Performed by: INTERNAL MEDICINE

## 2025-02-04 PROCEDURE — 93880 EXTRACRANIAL BILAT STUDY: CPT

## 2025-02-11 ENCOUNTER — DOCUMENTATION (OUTPATIENT)
Dept: CARDIOLOGY | Facility: HOSPITAL | Age: 89
End: 2025-02-11
Payer: MEDICARE

## 2025-03-07 ENCOUNTER — PREP FOR SURGERY (OUTPATIENT)
Dept: OTHER | Facility: HOSPITAL | Age: 89
End: 2025-03-07
Payer: MEDICARE

## 2025-03-07 DIAGNOSIS — I35.0 NONRHEUMATIC AORTIC VALVE STENOSIS: Primary | ICD-10-CM

## 2025-03-07 DIAGNOSIS — I73.9 PERIPHERAL VASCULAR DISEASE: ICD-10-CM

## 2025-03-07 RX ORDER — NITROGLYCERIN 0.4 MG/1
0.4 TABLET SUBLINGUAL
OUTPATIENT
Start: 2025-03-27

## 2025-03-07 RX ORDER — ASPIRIN 325 MG
325 TABLET ORAL NIGHTLY
OUTPATIENT
Start: 2025-03-26 | End: 2025-03-27

## 2025-03-07 RX ORDER — CHLORHEXIDINE GLUCONATE 500 MG/1
1 CLOTH TOPICAL EVERY 12 HOURS PRN
OUTPATIENT
Start: 2025-03-26

## 2025-03-07 RX ORDER — CHLORHEXIDINE GLUCONATE 500 MG/1
CLOTH TOPICAL EVERY 12 HOURS PRN
OUTPATIENT
Start: 2025-03-27

## 2025-03-07 RX ORDER — CHLORHEXIDINE GLUCONATE ORAL RINSE 1.2 MG/ML
15 SOLUTION DENTAL ONCE
OUTPATIENT
Start: 2025-03-27 | End: 2025-03-27

## 2025-03-24 ENCOUNTER — DOCUMENTATION (OUTPATIENT)
Dept: CARDIOLOGY | Facility: HOSPITAL | Age: 89
End: 2025-03-24
Payer: MEDICARE

## 2025-03-24 NOTE — PROGRESS NOTES
Message received from daughter with questions regarding TAVR procedure on 3/27. Returned call, spoke with daughter about cardiac rehab, PAT and TAVR procedure, post-TAVR recovery and hospitalization expectations. She will call with any questions or concerns.     Will follow up after PAT and TAVR

## 2025-03-26 ENCOUNTER — HOSPITAL ENCOUNTER (OUTPATIENT)
Dept: GENERAL RADIOLOGY | Facility: HOSPITAL | Age: 89
Discharge: HOME | End: 2025-03-26
Payer: MEDICARE

## 2025-03-26 ENCOUNTER — HOSPITAL ENCOUNTER (OUTPATIENT)
Dept: PULMONOLOGY | Facility: HOSPITAL | Age: 89
Discharge: HOME | End: 2025-03-26
Admitting: PHYSICIAN ASSISTANT
Payer: MEDICARE

## 2025-03-26 ENCOUNTER — ANESTHESIA EVENT (OUTPATIENT)
Dept: PERIOP | Facility: HOSPITAL | Age: 89
End: 2025-03-26
Payer: MEDICARE

## 2025-03-26 ENCOUNTER — PRE-ADMISSION TESTING (OUTPATIENT)
Dept: PREADMISSION TESTING | Facility: HOSPITAL | Age: 89
DRG: 267 | End: 2025-03-26
Payer: MEDICARE

## 2025-03-26 VITALS — BODY MASS INDEX: 24.95 KG/M2 | OXYGEN SATURATION: 100 % | HEIGHT: 64 IN | WEIGHT: 146.16 LBS

## 2025-03-26 DIAGNOSIS — I35.0 NONRHEUMATIC AORTIC VALVE STENOSIS: ICD-10-CM

## 2025-03-26 LAB
ABO GROUP BLD: NORMAL
ALBUMIN SERPL-MCNC: 3.8 G/DL (ref 3.5–5.2)
ALBUMIN/GLOB SERPL: 1.6 G/DL
ALP SERPL-CCNC: 79 U/L (ref 39–117)
ALT SERPL W P-5'-P-CCNC: 21 U/L (ref 1–33)
AMPHET+METHAMPHET UR QL: NEGATIVE
AMPHETAMINES UR QL: NEGATIVE
ANION GAP SERPL CALCULATED.3IONS-SCNC: 11 MMOL/L (ref 5–15)
APTT PPP: 29.6 SECONDS (ref 22–39)
AST SERPL-CCNC: 26 U/L (ref 1–32)
BARBITURATES UR QL SCN: NEGATIVE
BASOPHILS # BLD AUTO: 0.03 10*3/MM3 (ref 0–0.2)
BASOPHILS NFR BLD AUTO: 0.4 % (ref 0–1.5)
BENZODIAZ UR QL SCN: NEGATIVE
BILIRUB SERPL-MCNC: 0.6 MG/DL (ref 0–1.2)
BLD GP AB SCN SERPL QL: NEGATIVE
BUN SERPL-MCNC: 17 MG/DL (ref 8–23)
BUN/CREAT SERPL: 27 (ref 7–25)
BUPRENORPHINE SERPL-MCNC: NEGATIVE NG/ML
CALCIUM SPEC-SCNC: 9.3 MG/DL (ref 8.6–10.5)
CANNABINOIDS SERPL QL: NEGATIVE
CHLORIDE SERPL-SCNC: 98 MMOL/L (ref 98–107)
CO2 SERPL-SCNC: 27 MMOL/L (ref 22–29)
COCAINE UR QL: NEGATIVE
CREAT SERPL-MCNC: 0.63 MG/DL (ref 0.57–1)
DEPRECATED RDW RBC AUTO: 49.6 FL (ref 37–54)
EGFRCR SERPLBLD CKD-EPI 2021: 85.4 ML/MIN/1.73
EOSINOPHIL # BLD AUTO: 0.14 10*3/MM3 (ref 0–0.4)
EOSINOPHIL NFR BLD AUTO: 2.1 % (ref 0.3–6.2)
ERYTHROCYTE [DISTWIDTH] IN BLOOD BY AUTOMATED COUNT: 14.4 % (ref 12.3–15.4)
FENTANYL UR-MCNC: NEGATIVE NG/ML
GLOBULIN UR ELPH-MCNC: 2.4 GM/DL
GLUCOSE SERPL-MCNC: 86 MG/DL (ref 65–99)
HBA1C MFR BLD: 5.2 % (ref 4.8–5.6)
HCT VFR BLD AUTO: 36.5 % (ref 34–46.6)
HGB BLD-MCNC: 11.9 G/DL (ref 12–15.9)
IMM GRANULOCYTES # BLD AUTO: 0.01 10*3/MM3 (ref 0–0.05)
IMM GRANULOCYTES NFR BLD AUTO: 0.1 % (ref 0–0.5)
INR PPP: 1.26 (ref 0.89–1.12)
LYMPHOCYTES # BLD AUTO: 2.97 10*3/MM3 (ref 0.7–3.1)
LYMPHOCYTES NFR BLD AUTO: 43.9 % (ref 19.6–45.3)
MAGNESIUM SERPL-MCNC: 1.9 MG/DL (ref 1.6–2.4)
MCH RBC QN AUTO: 30.6 PG (ref 26.6–33)
MCHC RBC AUTO-ENTMCNC: 32.6 G/DL (ref 31.5–35.7)
MCV RBC AUTO: 93.8 FL (ref 79–97)
METHADONE UR QL SCN: NEGATIVE
MONOCYTES # BLD AUTO: 0.37 10*3/MM3 (ref 0.1–0.9)
MONOCYTES NFR BLD AUTO: 5.5 % (ref 5–12)
NEUTROPHILS NFR BLD AUTO: 3.24 10*3/MM3 (ref 1.7–7)
NEUTROPHILS NFR BLD AUTO: 48 % (ref 42.7–76)
NRBC BLD AUTO-RTO: 0 /100 WBC (ref 0–0.2)
OPIATES UR QL: NEGATIVE
OXYCODONE UR QL SCN: NEGATIVE
PA ADP PRP-ACNC: 249 PRU
PCP UR QL SCN: NEGATIVE
PLATELET # BLD AUTO: 149 10*3/MM3 (ref 140–450)
PMV BLD AUTO: 9.8 FL (ref 6–12)
POTASSIUM SERPL-SCNC: 4.6 MMOL/L (ref 3.5–5.2)
PROT SERPL-MCNC: 6.2 G/DL (ref 6–8.5)
PROTHROMBIN TIME: 15.9 SECONDS (ref 12.2–14.5)
RBC # BLD AUTO: 3.89 10*6/MM3 (ref 3.77–5.28)
RH BLD: POSITIVE
SODIUM SERPL-SCNC: 136 MMOL/L (ref 136–145)
T&S EXPIRATION DATE: NORMAL
TRICYCLICS UR QL SCN: NEGATIVE
WBC NRBC COR # BLD AUTO: 6.76 10*3/MM3 (ref 3.4–10.8)

## 2025-03-26 PROCEDURE — 85576 BLOOD PLATELET AGGREGATION: CPT

## 2025-03-26 PROCEDURE — 36415 COLL VENOUS BLD VENIPUNCTURE: CPT

## 2025-03-26 PROCEDURE — 85730 THROMBOPLASTIN TIME PARTIAL: CPT

## 2025-03-26 PROCEDURE — 85025 COMPLETE CBC W/AUTO DIFF WBC: CPT

## 2025-03-26 PROCEDURE — 80305 DRUG TEST PRSMV DIR OPT OBS: CPT

## 2025-03-26 PROCEDURE — 93005 ELECTROCARDIOGRAM TRACING: CPT

## 2025-03-26 PROCEDURE — 93010 ELECTROCARDIOGRAM REPORT: CPT | Performed by: INTERNAL MEDICINE

## 2025-03-26 PROCEDURE — 94010 BREATHING CAPACITY TEST: CPT

## 2025-03-26 PROCEDURE — 86850 RBC ANTIBODY SCREEN: CPT

## 2025-03-26 PROCEDURE — 80307 DRUG TEST PRSMV CHEM ANLYZR: CPT

## 2025-03-26 PROCEDURE — 86900 BLOOD TYPING SEROLOGIC ABO: CPT

## 2025-03-26 PROCEDURE — 71046 X-RAY EXAM CHEST 2 VIEWS: CPT

## 2025-03-26 PROCEDURE — 86901 BLOOD TYPING SEROLOGIC RH(D): CPT

## 2025-03-26 PROCEDURE — 83735 ASSAY OF MAGNESIUM: CPT

## 2025-03-26 PROCEDURE — 85610 PROTHROMBIN TIME: CPT

## 2025-03-26 PROCEDURE — 86923 COMPATIBILITY TEST ELECTRIC: CPT

## 2025-03-26 PROCEDURE — 83036 HEMOGLOBIN GLYCOSYLATED A1C: CPT

## 2025-03-26 PROCEDURE — 80053 COMPREHEN METABOLIC PANEL: CPT

## 2025-03-26 RX ORDER — ASPIRIN 325 MG
325 TABLET ORAL NIGHTLY
Status: ACTIVE | OUTPATIENT
Start: 2025-03-26 | End: 2025-03-27

## 2025-03-26 RX ORDER — SODIUM CHLORIDE 0.9 % (FLUSH) 0.9 %
10 SYRINGE (ML) INJECTION AS NEEDED
Status: CANCELLED | OUTPATIENT
Start: 2025-03-26

## 2025-03-26 RX ORDER — FAMOTIDINE 10 MG/ML
20 INJECTION, SOLUTION INTRAVENOUS ONCE
Status: CANCELLED | OUTPATIENT
Start: 2025-03-26 | End: 2025-03-26

## 2025-03-26 RX ORDER — CILOSTAZOL 50 MG/1
50 TABLET ORAL 2 TIMES DAILY
COMMUNITY

## 2025-03-26 RX ORDER — LOSARTAN POTASSIUM AND HYDROCHLOROTHIAZIDE 12.5; 5 MG/1; MG/1
1 TABLET ORAL DAILY
COMMUNITY

## 2025-03-26 RX ORDER — CARVEDILOL 25 MG/1
25 TABLET ORAL NIGHTLY
COMMUNITY

## 2025-03-26 RX ORDER — CHLORHEXIDINE GLUCONATE 500 MG/1
1 CLOTH TOPICAL EVERY 12 HOURS PRN
Status: ACTIVE | OUTPATIENT
Start: 2025-03-26

## 2025-03-26 RX ORDER — ESTRADIOL 0.1 MG/G
2 CREAM VAGINAL 3 TIMES WEEKLY
COMMUNITY

## 2025-03-26 NOTE — PAT
Patient to apply Chlorhexadine wipes  to surgical area (as instructed) the night before procedure and the AM of procedure. Wipes provided.    Patient instructed to drink 20 ounces of Gatorade or Gatorlyte (if diabetic) and it needs to be completed 1 hour (for Main OR patients) or 2 hours (scheduled  section & [facility] patients) before given arrival time for procedure (NO RED Gatorade and NO Gatorade Zero).  Patient verbalized understanding.    Instructed patient to take 325 mg of Asprin (or four tablets of the 81 mg strength) the night before heart surgery as per CT surgeon's order.  Patient and/or family verbalized understanding.    Bactroban supplied to patient with instructions both written and verbally to insert Bactroban into each nares the night before surgery.    Patient directed to Radiology Department for CXR and pft  after Pre Admission Testing Appointment.     Patient viewed general PAT education video as instructed in their preoperative information received from their surgeon.  Patient stated the general PAT education video was viewed in its entirety and survey completed.  Copies of PAT general education handouts (Incentive Spirometry, Meds to Beds Program, Patient Belongings, Pre-op skin preparation instructions, Blood Glucose testing, Visitor policy, Surgery FAQ, Code H) distributed to patient if not printed. Education related to the PAT pass and skin preparation for surgery (if applicable) completed in PAT as a reinforcement to PAT education video. Patient instructed to return PAT pass provided today as well as completed skin preparation sheet (if applicable) on the day of procedure.     Additionally if patient had not viewed video yet but intended to view it at home or in our waiting area, then referred them to the handout with QR code/link provided during PAT visit.  Encouraged patient/family to read PAT general education handouts thoroughly and notify PAT staff with any questions or concerns.  Patient verbalized understanding of all information and priority content.    Blood bank bracelet applied to patient during Pre Admission Testing visit.  Patient instructed not to remove from arm until after procedure and they are discharged from the hospital.  Explained to patient that they may shower and get the bracelet wet, but not to immerse under water for longer periods (bathing, swimming, hand dishwashing, etc).  Patient verbalized understanding.    Patient reported last dose of Warfarin on 3-22-25 and holding per Dr Tao's office instructions  Also last dose of Pletal on 2-4-25 and holding per JOSEPH Shine instructions per pt report

## 2025-03-27 ENCOUNTER — HOSPITAL ENCOUNTER (INPATIENT)
Facility: HOSPITAL | Age: 89
LOS: 1 days | Discharge: HOME | DRG: 267 | End: 2025-03-28
Attending: THORACIC SURGERY (CARDIOTHORACIC VASCULAR SURGERY) | Admitting: THORACIC SURGERY (CARDIOTHORACIC VASCULAR SURGERY)
Payer: MEDICARE

## 2025-03-27 ENCOUNTER — ANESTHESIA (OUTPATIENT)
Dept: PERIOP | Facility: HOSPITAL | Age: 89
End: 2025-03-27
Payer: MEDICARE

## 2025-03-27 ENCOUNTER — ANCILLARY PROCEDURE (OUTPATIENT)
Dept: PERIOP | Facility: HOSPITAL | Age: 89
DRG: 267 | End: 2025-03-27
Payer: MEDICARE

## 2025-03-27 DIAGNOSIS — I35.0 NONRHEUMATIC AORTIC VALVE STENOSIS: Primary | ICD-10-CM

## 2025-03-27 DIAGNOSIS — Z95.3 S/P TAVR (TRANSCATHETER AORTIC VALVE REPLACEMENT), BIOPROSTHETIC: ICD-10-CM

## 2025-03-27 DIAGNOSIS — I35.0 NONRHEUMATIC AORTIC VALVE STENOSIS: ICD-10-CM

## 2025-03-27 LAB
ABO GROUP BLD: NORMAL
ACT BLD: 130 SECONDS (ref 82–152)
ANION GAP SERPL CALCULATED.3IONS-SCNC: 10 MMOL/L (ref 5–15)
APTT PPP: 31.8 SECONDS (ref 22–39)
BUN SERPL-MCNC: 18 MG/DL (ref 8–23)
BUN/CREAT SERPL: 27.7 (ref 7–25)
CALCIUM SPEC-SCNC: 8.4 MG/DL (ref 8.6–10.5)
CHLORIDE SERPL-SCNC: 102 MMOL/L (ref 98–107)
CO2 SERPL-SCNC: 25 MMOL/L (ref 22–29)
COTININE UR QL SCN: NEGATIVE NG/ML
CREAT SERPL-MCNC: 0.65 MG/DL (ref 0.57–1)
DEPRECATED RDW RBC AUTO: 49 FL (ref 37–54)
EGFRCR SERPLBLD CKD-EPI 2021: 84.8 ML/MIN/1.73
ERYTHROCYTE [DISTWIDTH] IN BLOOD BY AUTOMATED COUNT: 14.4 % (ref 12.3–15.4)
GLUCOSE BLDC GLUCOMTR-MCNC: 78 MG/DL (ref 70–130)
GLUCOSE SERPL-MCNC: 117 MG/DL (ref 65–99)
HCT VFR BLD AUTO: 33.2 % (ref 34–46.6)
HGB BLD-MCNC: 11 G/DL (ref 12–15.9)
MAGNESIUM SERPL-MCNC: 1.8 MG/DL (ref 1.6–2.4)
MCH RBC QN AUTO: 31.2 PG (ref 26.6–33)
MCHC RBC AUTO-ENTMCNC: 33.1 G/DL (ref 31.5–35.7)
MCV RBC AUTO: 94.1 FL (ref 79–97)
PLATELET # BLD AUTO: 128 10*3/MM3 (ref 140–450)
PMV BLD AUTO: 9.6 FL (ref 6–12)
POTASSIUM SERPL-SCNC: 3.9 MMOL/L (ref 3.5–5.2)
QT INTERVAL: 448 MS
QT INTERVAL: 482 MS
QTC INTERVAL: 476 MS
QTC INTERVAL: 545 MS
RBC # BLD AUTO: 3.53 10*6/MM3 (ref 3.77–5.28)
RH BLD: POSITIVE
SERVICE CMNT-IMP: NORMAL
SODIUM SERPL-SCNC: 137 MMOL/L (ref 136–145)
WBC NRBC COR # BLD AUTO: 9.76 10*3/MM3 (ref 3.4–10.8)

## 2025-03-27 PROCEDURE — 82330 ASSAY OF CALCIUM: CPT

## 2025-03-27 PROCEDURE — C1889 IMPLANT/INSERT DEVICE, NOC: HCPCS | Performed by: THORACIC SURGERY (CARDIOTHORACIC VASCULAR SURGERY)

## 2025-03-27 PROCEDURE — B41F1ZZ FLUOROSCOPY OF RIGHT LOWER EXTREMITY ARTERIES USING LOW OSMOLAR CONTRAST: ICD-10-PCS | Performed by: INTERNAL MEDICINE

## 2025-03-27 PROCEDURE — S0260 H&P FOR SURGERY: HCPCS | Performed by: THORACIC SURGERY (CARDIOTHORACIC VASCULAR SURGERY)

## 2025-03-27 PROCEDURE — 86900 BLOOD TYPING SEROLOGIC ABO: CPT

## 2025-03-27 PROCEDURE — 85347 COAGULATION TIME ACTIVATED: CPT

## 2025-03-27 PROCEDURE — 25810000003 LACTATED RINGERS PER 1000 ML: Performed by: ANESTHESIOLOGY

## 2025-03-27 PROCEDURE — 25810000003 SODIUM CHLORIDE 0.9 % SOLUTION 250 ML FLEX CONT

## 2025-03-27 PROCEDURE — 25010000002 PROTAMINE SULFATE PER 10 MG

## 2025-03-27 PROCEDURE — 83735 ASSAY OF MAGNESIUM: CPT | Performed by: THORACIC SURGERY (CARDIOTHORACIC VASCULAR SURGERY)

## 2025-03-27 PROCEDURE — C1894 INTRO/SHEATH, NON-LASER: HCPCS | Performed by: THORACIC SURGERY (CARDIOTHORACIC VASCULAR SURGERY)

## 2025-03-27 PROCEDURE — 93005 ELECTROCARDIOGRAM TRACING: CPT | Performed by: PHYSICIAN ASSISTANT

## 2025-03-27 PROCEDURE — 63710000001 ONDANSETRON ODT 4 MG TABLET DISPERSIBLE: Performed by: PHYSICIAN ASSISTANT

## 2025-03-27 PROCEDURE — 93355 ECHO TRANSESOPHAGEAL (TEE): CPT | Performed by: INTERNAL MEDICINE

## 2025-03-27 PROCEDURE — 25010000002 MAGNESIUM SULFATE 4 GM/100ML SOLUTION: Performed by: INTERNAL MEDICINE

## 2025-03-27 PROCEDURE — 82803 BLOOD GASES ANY COMBINATION: CPT

## 2025-03-27 PROCEDURE — 85027 COMPLETE CBC AUTOMATED: CPT | Performed by: PHYSICIAN ASSISTANT

## 2025-03-27 PROCEDURE — 25010000002 DEXAMETHASONE PER 1 MG

## 2025-03-27 PROCEDURE — C1769 GUIDE WIRE: HCPCS | Performed by: THORACIC SURGERY (CARDIOTHORACIC VASCULAR SURGERY)

## 2025-03-27 PROCEDURE — 25010000002 PHENYLEPHRINE 10 MG/ML SOLUTION

## 2025-03-27 PROCEDURE — 25010000002 NICARDIPINE 2.5 MG/ML SOLUTION 10 ML VIAL

## 2025-03-27 PROCEDURE — C1760 CLOSURE DEV, VASC: HCPCS | Performed by: THORACIC SURGERY (CARDIOTHORACIC VASCULAR SURGERY)

## 2025-03-27 PROCEDURE — 93355 ECHO TRANSESOPHAGEAL (TEE): CPT

## 2025-03-27 PROCEDURE — 25810000003 SODIUM CHLORIDE 0.9 % SOLUTION 250 ML FLEX CONT: Performed by: PHYSICIAN ASSISTANT

## 2025-03-27 PROCEDURE — 84295 ASSAY OF SERUM SODIUM: CPT

## 2025-03-27 PROCEDURE — 86901 BLOOD TYPING SEROLOGIC RH(D): CPT

## 2025-03-27 PROCEDURE — 25010000002 MORPHINE PER 10 MG: Performed by: THORACIC SURGERY (CARDIOTHORACIC VASCULAR SURGERY)

## 2025-03-27 PROCEDURE — 99232 SBSQ HOSP IP/OBS MODERATE 35: CPT | Performed by: INTERNAL MEDICINE

## 2025-03-27 PROCEDURE — B3101ZZ FLUOROSCOPY OF THORACIC AORTA USING LOW OSMOLAR CONTRAST: ICD-10-PCS | Performed by: INTERNAL MEDICINE

## 2025-03-27 PROCEDURE — 25010000002 NICARDIPINE 2.5 MG/ML SOLUTION 10 ML VIAL: Performed by: PHYSICIAN ASSISTANT

## 2025-03-27 PROCEDURE — 25010000002 ONDANSETRON PER 1 MG

## 2025-03-27 PROCEDURE — 25010000002 PROPOFOL 10 MG/ML EMULSION

## 2025-03-27 PROCEDURE — 84132 ASSAY OF SERUM POTASSIUM: CPT

## 2025-03-27 PROCEDURE — 25010000002 HEPARIN (PORCINE) PER 1000 UNITS

## 2025-03-27 PROCEDURE — 25010000002 CEFAZOLIN PER 500 MG: Performed by: PHYSICIAN ASSISTANT

## 2025-03-27 PROCEDURE — 25810000003 SODIUM CHLORIDE PER 500 ML: Performed by: THORACIC SURGERY (CARDIOTHORACIC VASCULAR SURGERY)

## 2025-03-27 PROCEDURE — 80048 BASIC METABOLIC PNL TOTAL CA: CPT | Performed by: PHYSICIAN ASSISTANT

## 2025-03-27 PROCEDURE — 85730 THROMBOPLASTIN TIME PARTIAL: CPT | Performed by: PHYSICIAN ASSISTANT

## 2025-03-27 PROCEDURE — 82947 ASSAY GLUCOSE BLOOD QUANT: CPT

## 2025-03-27 PROCEDURE — 25510000001 IOPAMIDOL PER 1 ML: Performed by: THORACIC SURGERY (CARDIOTHORACIC VASCULAR SURGERY)

## 2025-03-27 PROCEDURE — 33361 REPLACE AORTIC VALVE PERQ: CPT | Performed by: THORACIC SURGERY (CARDIOTHORACIC VASCULAR SURGERY)

## 2025-03-27 PROCEDURE — 25010000002 LIDOCAINE PF 1% 1 % SOLUTION: Performed by: ANESTHESIOLOGY

## 2025-03-27 PROCEDURE — 02RF38Z REPLACEMENT OF AORTIC VALVE WITH ZOOPLASTIC TISSUE, PERCUTANEOUS APPROACH: ICD-10-PCS | Performed by: THORACIC SURGERY (CARDIOTHORACIC VASCULAR SURGERY)

## 2025-03-27 PROCEDURE — 25010000002 LIDOCAINE PF 1% 1 % SOLUTION

## 2025-03-27 PROCEDURE — 25010000002 HEPARIN (PORCINE) PER 1000 UNITS: Performed by: THORACIC SURGERY (CARDIOTHORACIC VASCULAR SURGERY)

## 2025-03-27 PROCEDURE — 25010000002 NICARDIPINE 2.5 MG/ML SOLUTION

## 2025-03-27 PROCEDURE — 85014 HEMATOCRIT: CPT

## 2025-03-27 PROCEDURE — 82948 REAGENT STRIP/BLOOD GLUCOSE: CPT

## 2025-03-27 PROCEDURE — 25810000003 SODIUM CHLORIDE 0.9 % SOLUTION: Performed by: PHYSICIAN ASSISTANT

## 2025-03-27 PROCEDURE — 25010000002 SUGAMMADEX 500 MG/5ML SOLUTION

## 2025-03-27 PROCEDURE — 25010000002 PHENYLEPHRINE 10 MG/ML SOLUTION 1 ML VIAL

## 2025-03-27 PROCEDURE — 93010 ELECTROCARDIOGRAM REPORT: CPT | Performed by: INTERNAL MEDICINE

## 2025-03-27 PROCEDURE — 33361 REPLACE AORTIC VALVE PERQ: CPT | Performed by: INTERNAL MEDICINE

## 2025-03-27 PROCEDURE — 25010000002 FENTANYL CITRATE (PF) 100 MCG/2ML SOLUTION

## 2025-03-27 PROCEDURE — 93005 ELECTROCARDIOGRAM TRACING: CPT | Performed by: INTERNAL MEDICINE

## 2025-03-27 DEVICE — TRANSCATHETER HEART VALVE
Type: IMPLANTABLE DEVICE | Site: HEART | Status: FUNCTIONAL
Brand: NAVITOR™

## 2025-03-27 DEVICE — LOADING SYSTEM
Type: IMPLANTABLE DEVICE | Site: HEART | Status: FUNCTIONAL
Brand: NAVITOR™

## 2025-03-27 DEVICE — DELIVERY SYSTEM
Type: IMPLANTABLE DEVICE | Site: HEART | Status: FUNCTIONAL
Brand: FLEXNAV™

## 2025-03-27 RX ORDER — ONDANSETRON 2 MG/ML
4 INJECTION INTRAMUSCULAR; INTRAVENOUS EVERY 6 HOURS PRN
Status: DISCONTINUED | OUTPATIENT
Start: 2025-03-27 | End: 2025-03-28 | Stop reason: HOSPADM

## 2025-03-27 RX ORDER — ONDANSETRON 2 MG/ML
4 INJECTION INTRAMUSCULAR; INTRAVENOUS ONCE AS NEEDED
Status: DISCONTINUED | OUTPATIENT
Start: 2025-03-27 | End: 2025-03-27 | Stop reason: HOSPADM

## 2025-03-27 RX ORDER — SODIUM CHLORIDE 9 MG/ML
250 INJECTION, SOLUTION INTRAVENOUS ONCE AS NEEDED
Status: ACTIVE | OUTPATIENT
Start: 2025-03-27 | End: 2025-03-28

## 2025-03-27 RX ORDER — LIDOCAINE HYDROCHLORIDE 10 MG/ML
0.5 INJECTION, SOLUTION EPIDURAL; INFILTRATION; INTRACAUDAL; PERINEURAL ONCE AS NEEDED
Status: COMPLETED | OUTPATIENT
Start: 2025-03-27 | End: 2025-03-27

## 2025-03-27 RX ORDER — SODIUM CHLORIDE 0.9 % (FLUSH) 0.9 %
10 SYRINGE (ML) INJECTION EVERY 12 HOURS SCHEDULED
Status: DISCONTINUED | OUTPATIENT
Start: 2025-03-27 | End: 2025-03-27 | Stop reason: HOSPADM

## 2025-03-27 RX ORDER — ONDANSETRON 4 MG/1
4 TABLET, ORALLY DISINTEGRATING ORAL EVERY 6 HOURS PRN
Status: DISCONTINUED | OUTPATIENT
Start: 2025-03-27 | End: 2025-03-28 | Stop reason: HOSPADM

## 2025-03-27 RX ORDER — CHLORHEXIDINE GLUCONATE 500 MG/1
CLOTH TOPICAL EVERY 12 HOURS PRN
Status: DISCONTINUED | OUTPATIENT
Start: 2025-03-27 | End: 2025-03-27 | Stop reason: HOSPADM

## 2025-03-27 RX ORDER — CHOLECALCIFEROL (VITAMIN D3) 25 MCG
2000 TABLET ORAL DAILY
Status: DISCONTINUED | OUTPATIENT
Start: 2025-03-27 | End: 2025-03-28 | Stop reason: HOSPADM

## 2025-03-27 RX ORDER — LEVOTHYROXINE SODIUM 75 UG/1
75 TABLET ORAL
Status: DISCONTINUED | OUTPATIENT
Start: 2025-03-27 | End: 2025-03-28 | Stop reason: HOSPADM

## 2025-03-27 RX ORDER — FENTANYL CITRATE 50 UG/ML
50 INJECTION, SOLUTION INTRAMUSCULAR; INTRAVENOUS
Status: DISCONTINUED | OUTPATIENT
Start: 2025-03-27 | End: 2025-03-27 | Stop reason: HOSPADM

## 2025-03-27 RX ORDER — LIDOCAINE HYDROCHLORIDE 10 MG/ML
INJECTION, SOLUTION EPIDURAL; INFILTRATION; INTRACAUDAL; PERINEURAL AS NEEDED
Status: DISCONTINUED | OUTPATIENT
Start: 2025-03-27 | End: 2025-03-27 | Stop reason: SURG

## 2025-03-27 RX ORDER — ATORVASTATIN CALCIUM 20 MG/1
20 TABLET, FILM COATED ORAL NIGHTLY
Status: DISCONTINUED | OUTPATIENT
Start: 2025-03-27 | End: 2025-03-28 | Stop reason: HOSPADM

## 2025-03-27 RX ORDER — HYDROCODONE BITARTRATE AND ACETAMINOPHEN 5; 325 MG/1; MG/1
1 TABLET ORAL EVERY 6 HOURS PRN
Refills: 0 | Status: DISCONTINUED | OUTPATIENT
Start: 2025-03-27 | End: 2025-03-28 | Stop reason: HOSPADM

## 2025-03-27 RX ORDER — DEXAMETHASONE SODIUM PHOSPHATE 4 MG/ML
INJECTION, SOLUTION INTRA-ARTICULAR; INTRALESIONAL; INTRAMUSCULAR; INTRAVENOUS; SOFT TISSUE AS NEEDED
Status: DISCONTINUED | OUTPATIENT
Start: 2025-03-27 | End: 2025-03-27 | Stop reason: SURG

## 2025-03-27 RX ORDER — PROTAMINE SULFATE 10 MG/ML
INJECTION, SOLUTION INTRAVENOUS AS NEEDED
Status: DISCONTINUED | OUTPATIENT
Start: 2025-03-27 | End: 2025-03-27 | Stop reason: SURG

## 2025-03-27 RX ORDER — PHENYLEPHRINE HYDROCHLORIDE 10 MG/ML
INJECTION INTRAVENOUS AS NEEDED
Status: DISCONTINUED | OUTPATIENT
Start: 2025-03-27 | End: 2025-03-27 | Stop reason: SURG

## 2025-03-27 RX ORDER — LANOLIN ALCOHOL/MO/W.PET/CERES
2000 CREAM (GRAM) TOPICAL DAILY
Status: DISCONTINUED | OUTPATIENT
Start: 2025-03-27 | End: 2025-03-28 | Stop reason: HOSPADM

## 2025-03-27 RX ORDER — ETOMIDATE 2 MG/ML
INJECTION INTRAVENOUS AS NEEDED
Status: DISCONTINUED | OUTPATIENT
Start: 2025-03-27 | End: 2025-03-27 | Stop reason: SURG

## 2025-03-27 RX ORDER — ONDANSETRON 2 MG/ML
INJECTION INTRAMUSCULAR; INTRAVENOUS AS NEEDED
Status: DISCONTINUED | OUTPATIENT
Start: 2025-03-27 | End: 2025-03-27 | Stop reason: SURG

## 2025-03-27 RX ORDER — HYDROMORPHONE HYDROCHLORIDE 1 MG/ML
0.5 INJECTION, SOLUTION INTRAMUSCULAR; INTRAVENOUS; SUBCUTANEOUS
Status: DISCONTINUED | OUTPATIENT
Start: 2025-03-27 | End: 2025-03-27 | Stop reason: HOSPADM

## 2025-03-27 RX ORDER — NICARDIPINE HYDROCHLORIDE 2.5 MG/ML
INJECTION INTRAVENOUS AS NEEDED
Status: DISCONTINUED | OUTPATIENT
Start: 2025-03-27 | End: 2025-03-27 | Stop reason: SURG

## 2025-03-27 RX ORDER — DIPHENOXYLATE HYDROCHLORIDE AND ATROPINE SULFATE 2.5; .025 MG/1; MG/1
1 TABLET ORAL DAILY
Status: DISCONTINUED | OUTPATIENT
Start: 2025-03-27 | End: 2025-03-28 | Stop reason: HOSPADM

## 2025-03-27 RX ORDER — HYDRALAZINE HYDROCHLORIDE 20 MG/ML
10 INJECTION INTRAMUSCULAR; INTRAVENOUS EVERY 6 HOURS PRN
Status: DISCONTINUED | OUTPATIENT
Start: 2025-03-27 | End: 2025-03-28 | Stop reason: HOSPADM

## 2025-03-27 RX ORDER — HEPARIN SODIUM 1000 [USP'U]/ML
INJECTION, SOLUTION INTRAVENOUS; SUBCUTANEOUS AS NEEDED
Status: DISCONTINUED | OUTPATIENT
Start: 2025-03-27 | End: 2025-03-27 | Stop reason: SURG

## 2025-03-27 RX ORDER — HYDROCHLOROTHIAZIDE 25 MG/1
12.5 TABLET ORAL
Status: DISCONTINUED | OUTPATIENT
Start: 2025-03-27 | End: 2025-03-28 | Stop reason: HOSPADM

## 2025-03-27 RX ORDER — SODIUM CHLORIDE 9 MG/ML
100 INJECTION, SOLUTION INTRAVENOUS CONTINUOUS
Status: ACTIVE | OUTPATIENT
Start: 2025-03-27 | End: 2025-03-27

## 2025-03-27 RX ORDER — LOSARTAN POTASSIUM 50 MG/1
50 TABLET ORAL
Status: DISCONTINUED | OUTPATIENT
Start: 2025-03-27 | End: 2025-03-28 | Stop reason: HOSPADM

## 2025-03-27 RX ORDER — GINGER ROOT/GINGER ROOT EXT 262.5 MG
1 CAPSULE ORAL DAILY
Status: DISCONTINUED | OUTPATIENT
Start: 2025-03-27 | End: 2025-03-27

## 2025-03-27 RX ORDER — LABETALOL HYDROCHLORIDE 5 MG/ML
10 INJECTION, SOLUTION INTRAVENOUS
Status: DISCONTINUED | OUTPATIENT
Start: 2025-03-27 | End: 2025-03-28 | Stop reason: HOSPADM

## 2025-03-27 RX ORDER — FERROUS SULFATE 325(65) MG
325 TABLET ORAL DAILY
Status: DISCONTINUED | OUTPATIENT
Start: 2025-03-27 | End: 2025-03-28 | Stop reason: HOSPADM

## 2025-03-27 RX ORDER — ASPIRIN 81 MG/1
81 TABLET ORAL DAILY
Status: DISCONTINUED | OUTPATIENT
Start: 2025-03-28 | End: 2025-03-28 | Stop reason: HOSPADM

## 2025-03-27 RX ORDER — POTASSIUM CHLORIDE 1.5 G/1.58G
20 POWDER, FOR SOLUTION ORAL ONCE
Status: COMPLETED | OUTPATIENT
Start: 2025-03-27 | End: 2025-03-27

## 2025-03-27 RX ORDER — CARVEDILOL 12.5 MG/1
25 TABLET ORAL 2 TIMES DAILY
Status: DISCONTINUED | OUTPATIENT
Start: 2025-03-27 | End: 2025-03-28 | Stop reason: HOSPADM

## 2025-03-27 RX ORDER — FENTANYL CITRATE 50 UG/ML
INJECTION, SOLUTION INTRAMUSCULAR; INTRAVENOUS AS NEEDED
Status: DISCONTINUED | OUTPATIENT
Start: 2025-03-27 | End: 2025-03-27 | Stop reason: SDUPTHER

## 2025-03-27 RX ORDER — CALCIUM CARBONATE 500(1250)
500 TABLET ORAL DAILY
Status: DISCONTINUED | OUTPATIENT
Start: 2025-03-27 | End: 2025-03-28 | Stop reason: HOSPADM

## 2025-03-27 RX ORDER — PROPOFOL 10 MG/ML
VIAL (ML) INTRAVENOUS AS NEEDED
Status: DISCONTINUED | OUTPATIENT
Start: 2025-03-27 | End: 2025-03-27 | Stop reason: SURG

## 2025-03-27 RX ORDER — NITROGLYCERIN 0.4 MG/1
0.4 TABLET SUBLINGUAL
Status: DISCONTINUED | OUTPATIENT
Start: 2025-03-27 | End: 2025-03-28 | Stop reason: HOSPADM

## 2025-03-27 RX ORDER — CHLORHEXIDINE GLUCONATE ORAL RINSE 1.2 MG/ML
15 SOLUTION DENTAL ONCE
Status: COMPLETED | OUTPATIENT
Start: 2025-03-27 | End: 2025-03-27

## 2025-03-27 RX ORDER — ACETAMINOPHEN 325 MG/1
650 TABLET ORAL EVERY 6 HOURS PRN
Status: DISCONTINUED | OUTPATIENT
Start: 2025-03-27 | End: 2025-03-28 | Stop reason: HOSPADM

## 2025-03-27 RX ORDER — PANTOPRAZOLE SODIUM 40 MG/1
40 TABLET, DELAYED RELEASE ORAL
Status: DISCONTINUED | OUTPATIENT
Start: 2025-03-27 | End: 2025-03-28 | Stop reason: HOSPADM

## 2025-03-27 RX ORDER — IOPAMIDOL 755 MG/ML
INJECTION, SOLUTION INTRAVASCULAR AS NEEDED
Status: DISCONTINUED | OUTPATIENT
Start: 2025-03-27 | End: 2025-03-27 | Stop reason: HOSPADM

## 2025-03-27 RX ORDER — MIDAZOLAM HYDROCHLORIDE 1 MG/ML
0.5 INJECTION, SOLUTION INTRAMUSCULAR; INTRAVENOUS
Status: DISCONTINUED | OUTPATIENT
Start: 2025-03-27 | End: 2025-03-27 | Stop reason: HOSPADM

## 2025-03-27 RX ORDER — NITROGLYCERIN 0.4 MG/1
0.4 TABLET SUBLINGUAL
Status: DISCONTINUED | OUTPATIENT
Start: 2025-03-27 | End: 2025-03-27 | Stop reason: HOSPADM

## 2025-03-27 RX ORDER — SODIUM CHLORIDE, SODIUM LACTATE, POTASSIUM CHLORIDE, CALCIUM CHLORIDE 600; 310; 30; 20 MG/100ML; MG/100ML; MG/100ML; MG/100ML
9 INJECTION, SOLUTION INTRAVENOUS CONTINUOUS
Status: ACTIVE | OUTPATIENT
Start: 2025-03-28 | End: 2025-03-28

## 2025-03-27 RX ORDER — FAMOTIDINE 20 MG/1
20 TABLET, FILM COATED ORAL ONCE
Status: COMPLETED | OUTPATIENT
Start: 2025-03-27 | End: 2025-03-27

## 2025-03-27 RX ORDER — SODIUM CHLORIDE 9 MG/ML
INJECTION, SOLUTION INTRAVENOUS AS NEEDED
Status: DISCONTINUED | OUTPATIENT
Start: 2025-03-27 | End: 2025-03-27 | Stop reason: HOSPADM

## 2025-03-27 RX ORDER — MORPHINE SULFATE 2 MG/ML
2 INJECTION, SOLUTION INTRAMUSCULAR; INTRAVENOUS EVERY 4 HOURS PRN
Status: DISPENSED | OUTPATIENT
Start: 2025-03-27 | End: 2025-03-28

## 2025-03-27 RX ORDER — MAGNESIUM SULFATE HEPTAHYDRATE 40 MG/ML
4 INJECTION, SOLUTION INTRAVENOUS ONCE
Status: COMPLETED | OUTPATIENT
Start: 2025-03-27 | End: 2025-03-27

## 2025-03-27 RX ORDER — ROCURONIUM BROMIDE 10 MG/ML
INJECTION, SOLUTION INTRAVENOUS AS NEEDED
Status: DISCONTINUED | OUTPATIENT
Start: 2025-03-27 | End: 2025-03-27 | Stop reason: SURG

## 2025-03-27 RX ADMIN — SODIUM CHLORIDE 7.5 MG/HR: 9 INJECTION, SOLUTION INTRAVENOUS at 20:00

## 2025-03-27 RX ADMIN — HEPARIN SODIUM 10000 UNITS: 1000 INJECTION INTRAVENOUS; SUBCUTANEOUS at 07:37

## 2025-03-27 RX ADMIN — SODIUM CHLORIDE, POTASSIUM CHLORIDE, SODIUM LACTATE AND CALCIUM CHLORIDE 9 ML/HR: 600; 310; 30; 20 INJECTION, SOLUTION INTRAVENOUS at 06:26

## 2025-03-27 RX ADMIN — LEVOTHYROXINE SODIUM 75 MCG: 0.07 TABLET ORAL at 16:25

## 2025-03-27 RX ADMIN — CHLORHEXIDINE GLUCONATE 15 ML: 1.2 SOLUTION ORAL at 06:26

## 2025-03-27 RX ADMIN — LIDOCAINE HYDROCHLORIDE 0.5 ML: 10 INJECTION, SOLUTION EPIDURAL; INFILTRATION; INTRACAUDAL; PERINEURAL at 06:26

## 2025-03-27 RX ADMIN — LIDOCAINE HYDROCHLORIDE 50 MG: 10 INJECTION, SOLUTION EPIDURAL; INFILTRATION; INTRACAUDAL; PERINEURAL at 07:10

## 2025-03-27 RX ADMIN — PHENYLEPHRINE HYDROCHLORIDE 100 MCG: 10 INJECTION INTRAVENOUS at 07:24

## 2025-03-27 RX ADMIN — PANTOPRAZOLE SODIUM 40 MG: 40 TABLET, DELAYED RELEASE ORAL at 16:24

## 2025-03-27 RX ADMIN — CYANOCOBALAMIN TAB 1000 MCG 2000 MCG: 1000 TAB at 16:25

## 2025-03-27 RX ADMIN — MAGNESIUM SULFATE IN WATER FOR 4 G: 40 INJECTION INTRAVENOUS at 16:18

## 2025-03-27 RX ADMIN — PROTAMINE SULFATE 150 MG: 10 INJECTION, SOLUTION INTRAVENOUS at 08:04

## 2025-03-27 RX ADMIN — SODIUM CHLORIDE 100 ML/HR: 9 INJECTION, SOLUTION INTRAVENOUS at 09:13

## 2025-03-27 RX ADMIN — Medication 2000 UNITS: at 16:18

## 2025-03-27 RX ADMIN — PHENYLEPHRINE HYDROCHLORIDE 100 MCG: 10 INJECTION INTRAVENOUS at 07:42

## 2025-03-27 RX ADMIN — CALCIUM 500 MG: 500 TABLET ORAL at 16:17

## 2025-03-27 RX ADMIN — ONDANSETRON 4 MG: 2 INJECTION INTRAMUSCULAR; INTRAVENOUS at 08:06

## 2025-03-27 RX ADMIN — SODIUM CHLORIDE 5 MG/HR: 9 INJECTION, SOLUTION INTRAVENOUS at 23:09

## 2025-03-27 RX ADMIN — ETOMIDATE INJECTION 24 MG: 2 SOLUTION INTRAVENOUS at 07:10

## 2025-03-27 RX ADMIN — PROPOFOL 30 MG: 10 INJECTION, EMULSION INTRAVENOUS at 07:10

## 2025-03-27 RX ADMIN — ONDANSETRON 4 MG: 4 TABLET, ORALLY DISINTEGRATING ORAL at 23:08

## 2025-03-27 RX ADMIN — FENTANYL CITRATE 100 MCG: 50 INJECTION, SOLUTION INTRAMUSCULAR; INTRAVENOUS at 07:10

## 2025-03-27 RX ADMIN — SUGAMMADEX 300 MG: 100 INJECTION, SOLUTION INTRAVENOUS at 08:17

## 2025-03-27 RX ADMIN — SODIUM CHLORIDE 12.5 MG/HR: 9 INJECTION, SOLUTION INTRAVENOUS at 10:22

## 2025-03-27 RX ADMIN — FAMOTIDINE 20 MG: 20 TABLET, FILM COATED ORAL at 06:26

## 2025-03-27 RX ADMIN — ATORVASTATIN CALCIUM 20 MG: 20 TABLET, FILM COATED ORAL at 23:08

## 2025-03-27 RX ADMIN — SODIUM CHLORIDE 12.5 MG/HR: 9 INJECTION, SOLUTION INTRAVENOUS at 12:27

## 2025-03-27 RX ADMIN — POTASSIUM CHLORIDE 20 MEQ: 1.5 FOR SOLUTION ORAL at 16:18

## 2025-03-27 RX ADMIN — NICARDIPINE HYDROCHLORIDE 200 MCG: 25 INJECTION INTRAVENOUS at 08:30

## 2025-03-27 RX ADMIN — ROCURONIUM BROMIDE 20 MG: 10 INJECTION INTRAVENOUS at 07:30

## 2025-03-27 RX ADMIN — SODIUM CHLORIDE 5 MG/HR: 9 INJECTION, SOLUTION INTRAVENOUS at 07:56

## 2025-03-27 RX ADMIN — MORPHINE SULFATE 2 MG: 2 INJECTION, SOLUTION INTRAMUSCULAR; INTRAVENOUS at 10:53

## 2025-03-27 RX ADMIN — HEPARIN SODIUM 5000 UNITS: 1000 INJECTION INTRAVENOUS; SUBCUTANEOUS at 07:48

## 2025-03-27 RX ADMIN — ROCURONIUM BROMIDE 10 MG: 10 INJECTION INTRAVENOUS at 08:06

## 2025-03-27 RX ADMIN — ROCURONIUM BROMIDE 50 MG: 10 INJECTION INTRAVENOUS at 07:10

## 2025-03-27 RX ADMIN — LOSARTAN POTASSIUM 50 MG: 50 TABLET, FILM COATED ORAL at 16:24

## 2025-03-27 RX ADMIN — SUGAMMADEX 100 MG: 100 INJECTION, SOLUTION INTRAVENOUS at 08:28

## 2025-03-27 RX ADMIN — DEXAMETHASONE SODIUM PHOSPHATE 4 MG: 4 INJECTION INTRA-ARTICULAR; INTRALESIONAL; INTRAMUSCULAR; INTRAVENOUS; SOFT TISSUE at 07:10

## 2025-03-27 RX ADMIN — SODIUM CHLORIDE 2 G: 900 INJECTION INTRAVENOUS at 07:07

## 2025-03-27 RX ADMIN — MUPIROCIN 1 APPLICATION: 20 OINTMENT TOPICAL at 06:26

## 2025-03-27 RX ADMIN — PHENYLEPHRINE HYDROCHLORIDE 20 MCG/MIN: 10 INJECTION INTRAVENOUS at 07:21

## 2025-03-27 NOTE — PROGRESS NOTES
Intensive Care Follow-up     Hospital:  LOS: 0 days   Ms. Patricia Redmond, 89 y.o. female is followed for:   Nonrheumatic aortic valve stenosis            History of present illness:   89-year-old female with a history of severe aortic stenosis, peripheral vascular disease, hypertension, hyperlipidemia, and hypothyroidism.  Who presents to the [facility] ICU status post TAVR.  Seen on arrival.  Patient still somewhat sedated.    The patient's past medical, surgical and social history were reviewed and updated in Epic as appropriate.       Objective     Infusions:  [START ON 3/28/2025] lactated ringers, 9 mL/hr, Last Rate: 9 mL/hr (03/27/25 0626)  niCARdipine, 5-15 mg/hr, Last Rate: 2.5 mg/hr (03/27/25 0804)  phenylephrine, 0.5-3 mcg/kg/min      Medications:     I reviewed the patient's medications.    Vital Sign Min/Max for last 24 hours  Temp  Min: 97.8 °F (36.6 °C)  Max: 97.8 °F (36.6 °C)   BP  Min: 165/80  Max: 165/80   Pulse  Min: 73  Max: 73   Resp  Min: 15  Max: 15   SpO2  Min: 98 %  Max: 100 %   No data recorded       Input/Output for last 24 hour shift  No intake/output data recorded.   S RR:  [5-14] 6  GENERAL : NAD, conversant  RESPIRATORY/THORAX : normal respiratory effort and no intercostal retractions, CTAB  CARDIOVASCULAR : Normal S1/S2, RRR. no lower ext edema.  GASTROINTESTINAL : Soft, NT/ND. BS x 4 normoactive. No hepatosplenomegaly.  MUSCULOSKELETAL : No cyanosis, clubbing, or ischemia  NEUROLOGICAL: alert and oriented to person, place and time  PSYCHOLOGICAL : Appropriate affect      Results from last 7 days   Lab Units 03/26/25  1237   WBC 10*3/mm3 6.76   HEMOGLOBIN g/dL 11.9*   PLATELETS 10*3/mm3 149     Results from last 7 days   Lab Units 03/26/25  1237   SODIUM mmol/L 136   POTASSIUM mmol/L 4.6   CO2 mmol/L 27.0   BUN mg/dL 17   CREATININE mg/dL 0.63   MAGNESIUM mg/dL 1.9   GLUCOSE mg/dL 86     Estimated Creatinine Clearance: 57.8 mL/min (by C-G formula based on SCr of 0.63  mg/dL).          I reviewed the patient's new clinical results.  I reviewed the patient's new imaging results/reports including actual images and agree with reports.           Assessment & Plan   Impression        Nonrheumatic aortic valve stenosis    Essential hypertension    Peripheral vascular disease    Dyslipidemia    Hypothyroidism (acquired)       Plan        89-year-old female with a history of severe aortic stenosis, peripheral vascular disease, hypertension, hyperlipidemia, and hypothyroidism.  Who presents to the [facility] ICU status post TAVR.    -Postop orders per CT surgery  -Continue antihypertensive medications  -Statin for hyperlipidemia  -Continue levothyroxine for hypothyroidism  -Nicardipine and Anup-Synephrine for blood pressure control  -Cardiology following  -A.m. labs    I conducted multidisciplinary rounds and the plan of care was discussed with the multidisciplinary team at that time. In attendance at multidisciplinary rounds was clinical pharmacist, dietitian, nursing staff, and case management.    I discussed the patient's findings and my recommendations with patient       C.Liam Dejesus, DO  Pulmonary, Critical care and Sleep Medicine

## 2025-03-27 NOTE — ANESTHESIA POSTPROCEDURE EVALUATION
Patient: Patricia Redmond    Procedure Summary       Date: 03/27/25 Room / Location: [facility] OR 01 / [facility] HYBRID OR; [facility]    Anesthesia Start: 0703 Anesthesia Stop: 0845    Procedures:       TRANSCATHETER AORTIC VALVE REPLACEMENT / DORIS (Chest)      Transfemoral Transcatheter Aortic Valve Replacement (Chest)      ANESTHESIA DORIS Diagnosis:       Nonrheumatic aortic valve stenosis      (Nonrheumatic aortic valve stenosis [I35.0])    Scheduled Providers: Carolin Green MD; Boni Tao MD Provider: Edison Smith MD    Anesthesia Type: general ASA Status: 4            Anesthesia Type: general    Vitals  Vitals Value Taken Time   /73 03/27/25 08:46   Temp 97 °F (36.1 °C) 03/27/25 08:46   Pulse 69 03/27/25 08:46   Resp 14 03/27/25 08:46   SpO2 94 % 03/27/25 08:46           Post Anesthesia Care and Evaluation    Patient location during evaluation: PACU  Patient participation: complete - patient participated  Level of consciousness: awake and alert  Pain score: 0  Pain management: adequate    Airway patency: patent  Anesthetic complications: No anesthetic complications  PONV Status: none  Cardiovascular status: hemodynamically stable and acceptable  Respiratory status: nonlabored ventilation, acceptable and nasal cannula  Hydration status: acceptable    Comments: Report given to icu rn, all questions answered. Pt stable.

## 2025-03-27 NOTE — ANESTHESIA PROCEDURE NOTES
Intra-Op Anesthesia DORIS    Procedure Performed: Intra-Op Anesthesia DORIS       Start Time:        End Time:      Preanesthesia Checklist:  Patient identified, IV assessed, risks and benefits discussed, monitors and equipment assessed, procedure being performed at surgeon's request and anesthesia consent obtained.    General Procedure Information  Diagnostic Indications for Echo:  assessment of ascending aorta, assessment of surgical repair, defect repair evaluation and hemodynamic monitoring  Physician Requesting Echo: Boni Tao MD  Location performed:  OR  Intubated  Bite block not placed  Heart visualized  Probe Insertion:  Easy  Probe Type:  Multiplane  Modalities:  Pulse wave Doppler, continuous wave Doppler, color flow mapping and 2D only    Echocardiographic and Doppler Measurements    Ventricles    Right Ventricle:  Cavity size normal.  Global function normal.    Left Ventricle:  Cavity size normal.  Global Function normal.  Ejection Fraction 65%.      Ventricular Regional Function:  1- Basal Anteroseptal:  normal  2- Basal Anterior:  normal  3- Basal Anterolateral:  normal  4- Basal Inferolateral:  normal  5- Basal Inferior:  normal  6- Basal Inferoseptal:  normal  7- Mid Anteroseptal:  normal  8- Mid Anterior:  normal  9- Mid Anterolateral:  normal  10- Mid Inferolateral:  normal  11- Mid Inferior:  normal  12- Mid Inferoseptal:  normal  13- Apical Anterior:  normal  14- Apical Lateral:  normal  15- Apical Inferior:  normal  16- Apical Septal:  normal  17- [place]:  normal    Wall Motion Comments:       CONCENTRIC LVH    Valves    Aortic Valve:  Annulus calcified.  Stenosis severe.  Regurgitation trace.  Leaflets calcified.  Leaflet motions restricted.      Mitral Valve:  Annulus calcified.  Stenosis not present.  Regurgitation mild.  Leaflets normal.  Leaflet motions normal.      Other Valve Findings:       SEVERELY CALCIFIED TRICUSPID AV  LVOT DIAM 21  AV PEAK GRAD 83, MEAN 49  LVOT PEAK GRAD 4,  MEAN 2  CARLTON 0.66 BY VTI    TRACE AI    MILD MR    Aorta    Ascending Aorta:  Size dilated.  Diameter 3.72 cm.  Dissection not present.  Plaque thickness less than 3 mm.  Mobile plaque not present.    Aortic Arch:  Size normal.  Diameter 3.69 cm.  Dissection not present.  Plaque thickness less than 3 mm.  Mobile plaque not present.    Descending Aorta:  Size normal.  Diameter 2.13 cm.  Dissection not present.  Plaque thickness less than 3 mm.  Mobile plaque not present.            Septa        Ventricular Septum:  Intra-ventricular septum morphology normal.              Anesthesia Information  Performed Personally  Anesthesiologist:  Edison Smith MD      Echocardiogram Comments:       FOCUSED EXAM FOR TAVR    GOOD LV SYSTOLIC FUNCTION  BIOPROSTHETIC VALVE NOW IN AORTIC POSITION  ALL LEAFLETS VISUALIZED AND MOVING  NO PARAVALVULAR LEAKS  AV PEAK GRAD 12, MEAN 6  LVOT PEAK GRAD 6, MEAN 3  CARLTON 2.69 BY VTI  THERE IS A SMALL FLAP OF MOBILE TISSUE NOTED ARISING FROM THE SEPTUM AT THE ENTRANCE OF THE LVOT.  THIS COULD REPRESENT A CHORDAL ELEMENT OR PIECE OF TISSUE DISRUPTED FROM WIRE INSERTION.  THERE APPEARS TO BE NO CHANGE IN THE MITRAL REGURG.  THERE IS NO CHANGE IN PERICARDIAL FLUID POST DEPLOYMENT.

## 2025-03-27 NOTE — OP NOTE
Operative Report    Preop Diagnosis: Symptomatic aortic valvular stenosis    Results of STS risk score discussed with patient and family    Postoperative Diagnosis: Same      Procedure: Catheter in the left femoral artery and vein.  Cath in the right femoral artery and vein.  Thoracic arch aortogram.  Right femoral arteriogram.  Aortic valve replacement with a Navatar Abbott valve size 25.        Surgeons: Boni Tao MD      Assistant: Liz Burt PA-C    Cardiologist; Dr. Green and Dr. Noel    The Assistant provided services of suctioning, irrigation and retraction.  Also, assisted in suture closure of parts of the skin incision.      Indication: Patient referred to the transcatheter valve clinic with symptomatic already valvular stenosis.  She understood that surgery carried with the risk of stroke bleeding infection death and agreed to proceed.  No guarantees were made as to outcome she met a number of members of the transcatheter valve team and in a shared decision-making process we were in agreement that her age of 89 years and comorbidities that this would be in her best interest        Description: Supine position sterile prep drape antibiotics given general endotracheal anesthesia.  Left femoral artery and vein were percutaneously cannulated for cardiology services.  The right femoral artery was cannulated and 2 Perclose devices were deployed and the patient heparinized.  We were then able to cross the native aortic annulus with a straight wire and this was changed over a guide catheter to a safari wire.  The 14 Tuvaluan sheath the valve delivery system was introduced over the 035 guidewire into the right common femoral artery and advanced partially across the native aortic valve and positioned.  It was then slowly deployed until we have again confirmed and multiple planar views that it was in the appropriate position and using also short axis view echocardiogram confirmed this.  The  valve was then fully deployed following this there was no evidence of significant paravalvular leak.  The 14 Latvian sheath was then removed and the Perclose devices secured in position with good hemostasis.  A femoral arteriogram demonstrated no obstruction of flow.  The overall fluoroscopy time was 17 minutes and 36 seconds and the total contrast given 45 mL.  Blood loss less than 100 mL and there was no apparent early significant complications      Please note that portions of this note were completed with a voice recognition program. Efforts were made to edit the dictations, but occasionally words are mistranscribed.

## 2025-03-27 NOTE — PROGRESS NOTES
Structural Heart Team Meeting Summary       Patient Name: Patricia Redmond  YOB: 1935     Referring Physician: Dr. Green  Admission Status: Inpatient     Attendees: Boni Tao MD, Argentina Noel MD, Carolin Green MD, Alexander Clinical Specialist, Mandi Bustamante, Edison Smith MD, JOSEPH Castillo, and Pete clinical specialists Jailyn Dawson & Klever Eddy  Primary presentation of AS: Heart Failure   Heart Failure:   HFpEF   NYHA Functional Class: III   LVEF:  60%   ANNULUS Measurement: 2.4cm    Major Organ Compromise: GI dysfunction (Crohn's, ulcerative colitis, nutritional impairment, serum albumin < 3.0 and liver with any history of cirrhosis, variceal bleeding or elevated INR)    Procedure Specific Impediment: N/A      Other Factors:   Major Nutritional Deficit: No  Cognitive Impairment: No  Oxygen dependent: No    STS Risk Score:   Mortality Risk: 8.4%  Mortality and Morbidity Risk: 10.4%    TAVR/TMVR Rationale: Frail, elderly female with severe symptomatic AS and high STS risk score for TAVR. Structural heart team in agreement to proceed with TAVR     Diagnostic Studies Discussed/ Reviewed:  TTE, DORIS, LHC, CTA, carotid ultrasound, EKG, PAT lab results    Procedure planning details:   Valve Size: 25mm Abbott Navitor valve  Cardiology sheath access: Left femoral  CT Surgery sheath access: Right femoral    Post Procedure Considerations: Bleeding at groin sites, monitor for arrhythmias and s/s of CVA, encourage early ambulation and pulmonary hygiene

## 2025-03-27 NOTE — ANESTHESIA PREPROCEDURE EVALUATION
Anesthesia Evaluation     Patient summary reviewed and Nursing notes reviewed   history of anesthetic complications:  PONV               Airway   Mallampati: II  TM distance: >3 FB  Neck ROM: full  No difficulty expected  Dental - normal exam     Pulmonary - negative pulmonary ROS and normal exam   Cardiovascular - normal exam    (+) hypertension, valvular problems/murmurs AS, PVD, DVT    ROS comment: ECHO 12.23.24  ·  Left ventricular systolic function is normal. Estimated left ventricular EF = 60%  ·  Left ventricular wall thickness is consistent with mild concentric hypertrophy.  ·  Biatrial enlargement.  ·  Severe aortic valve stenosis is present.  Peak velocity 414 cm/s, mean gradient 40 mmHg.  Aortic annulus 2.4 cm, STJ 2.9 cm.  ·  Mild mitral regurgitation  ·  Trace tricuspid regurgitation.      Neuro/Psych  (+) TIA  GI/Hepatic/Renal/Endo    (+) GERD, thyroid problem hypothyroidism    Musculoskeletal (-) negative ROS    Abdominal  - normal exam    Bowel sounds: normal.   Substance History - negative use     OB/GYN negative ob/gyn ROS         Other                    Anesthesia Plan    ASA 4     general     (HARVINDER, DORIS)  intravenous induction     Anesthetic plan, risks, benefits, and alternatives have been provided, discussed and informed consent has been obtained with: patient.    Plan discussed with CRNA.    CODE STATUS:

## 2025-03-27 NOTE — H&P
Pre-Op H&P  Patricia Redmond  [id number]  1935    Chief complaint: fatigue and VERDUZCO    HPI:  Patient is a 89 y.o.female who presents with aortic stenosis and PVD. She was seen in the CT clinic and scheduled for transcatheter aortic valve replacement and transesophageal echocardiogram. She takes Warfarin routinely, last dose March 22nd.      Review of Systems:  General ROS: negative for chills, fever or skin lesions.  Cardiovascular ROS: no chest pain, chronic VERDUZCO  Respiratory ROS: no cough, shortness of breath, or wheezing    Allergies: No Known Allergies    Home Meds:    No current facility-administered medications on file prior to encounter.     Current Outpatient Medications on File Prior to Encounter   Medication Sig Dispense Refill    atorvastatin (LIPITOR) 20 MG tablet Take 1 tablet by mouth Every Night.  3    calcium carbonate-vitamin d 600-400 MG-UNIT per tablet Take 1 tablet by mouth 2 (Two) Times a Day.      carvedilol (COREG) 25 MG tablet Take 1 tablet by mouth 2 (Two) Times a Day. (Patient taking differently: Take 0.5 tablets by mouth Daily. 1/2 tab at NOON, AND 1 whole tab at QHS) 180 tablet 3    ezetimibe (ZETIA) 10 MG tablet Take 1 tablet by mouth Daily.  2    ferrous sulfate 325 (65 FE) MG tablet Take 1 tablet by mouth Daily. 1 tablet Daily      lansoprazole (PREVACID) 30 MG capsule Take 1 capsule by mouth Every Evening.      levothyroxine (SYNTHROID, LEVOTHROID) 75 MCG tablet Take 1 tablet by mouth Take As Directed. 75mcg daily except ON SUNDAY, TAKES 1.5 TABS  3    Multiple Vitamin (MULTI-VITAMIN DAILY PO) Take 1 tablet by mouth Daily.      Multiple Vitamins-Minerals (OCUVITE ADULT FORMULA PO) Take 1 tablet by mouth 2 (Two) Times a Day.      vitamin B-12 (CYANOCOBALAMIN) 1000 MCG tablet Take 2 tablets by mouth Daily.      Vitamin D, Cholecalciferol, 1000 units capsule Take 2 capsules by mouth Daily.      vitamin E 400 UNIT capsule Take 1 capsule by mouth Daily.      warfarin (COUMADIN) 5 MG  tablet Take 1 tablet by mouth Daily.         PMH:   Past Medical History:   Diagnosis Date    Anesthesia complication     slow to awaken    Aortic valve stenosis     Deep vein thrombosis     warfarin    Degenerative disc disease, lumbar     Dyslipidemia     GERD (gastroesophageal reflux disease)     Hypertension     Hypothyroidism     Inguinal hernia     Macular degeneration     Osteoporosis     Peripheral vascular disease     PONV (postoperative nausea and vomiting)     Pseudoxanthoma elasticum     TIA (transient ischemic attack)      PSH:    Past Surgical History:   Procedure Laterality Date    CARDIAC CATHETERIZATION N/A 12/23/2024    Procedure: Left Heart Cath - Left radial access;  Surgeon: Carolin Green MD;  Location: [facility] CATH INVASIVE LOCATION;  Service: Cardiovascular;  Laterality: N/A;    HAND TENDON SURGERY Right     HYSTERECTOMY      INGUINAL HERNIA REPAIR Left     INNER EAR SURGERY Right     TONSILLECTOMY         Immunization History:  Influenza: UTD  Pneumococcal: unsure  Tetanus: UTD    Social History:   Tobacco:   Social History     Tobacco Use   Smoking Status Never   Smokeless Tobacco Never      Alcohol:     Social History     Substance and Sexual Activity   Alcohol Use No       Vitals:           /80 (BP Location: Right arm, Patient Position: Lying)   Pulse 73   Temp 97.8 °F (36.6 °C) (Temporal)   Resp 15   SpO2 98%     Physical Exam:  General Appearance:    Alert, cooperative, no distress, appears stated age   Head:    Normocephalic, without obvious abnormality, atraumatic   Lungs:     Clear to auscultation bilaterally, respirations unlabored    Heart:   Regular rate and rhythm, systolic murmur auscultated    Abdomen:    Soft, nontender. No rigidity or guarding.    Breast Exam:    deferred   Genitalia:    deferred   Extremities:   Atraumatic    Skin:   Skin color, texture, turgor normal, no rashes or lesions   Neurologic:   Grossly intact   Results Review  LABS:  Lab Results    Component Value Date    WBC 6.76 03/26/2025    HGB 11.9 (L) 03/26/2025    HCT 36.5 03/26/2025    MCV 93.8 03/26/2025     03/26/2025    NEUTROABS 3.24 03/26/2025    GLUCOSE 86 03/26/2025    BUN 17 03/26/2025    CREATININE 0.63 03/26/2025     03/26/2025    K 4.6 03/26/2025    CL 98 03/26/2025    CO2 27.0 03/26/2025    MG 1.9 03/26/2025    CALCIUM 9.3 03/26/2025    ALBUMIN 3.8 03/26/2025    AST 26 03/26/2025    ALT 21 03/26/2025    BILITOT 0.6 03/26/2025    PTT 29.6 03/26/2025    INR 1.26 (H) 03/26/2025       RADIOLOGY:  No radiology results for the last 3 days     I reviewed the patient's new imaging results and agree with the interpretation.    Impression: aortic stenosis    Plan: transcatheter aortic valve replacement, DORIS with ECHO I agree with the above.  Patient is aware of the risk of stroke bleeding infection and bleeding from the leg and agrees to proceed    Van Beltran PA-C   3/27/2025   06:39 EDT

## 2025-03-27 NOTE — ANESTHESIA PROCEDURE NOTES
Airway  Reason: elective    Date/Time: 3/27/2025 7:13 AM  Airway not difficult    General Information and Staff    Patient location during procedure: OR  CRNA/CAA: Moy Alarcon CRNA    Indications and Patient Condition  Indications for airway management: airway protection    Preoxygenated: yes  MILS not maintained throughout    Mask difficulty assessment: 2 - vent by mask + OA or adjuvant +/- NMBA    Final Airway Details    Final airway type: endotracheal airway      Successful airway: ETT  Cuffed: yes   Successful intubation technique: video laryngoscopy  Endotracheal tube insertion site: oral  Blade: Emmy  Blade size: 3  ETT size (mm): 7.0  Cormack-Lehane Classification: grade I - full view of glottis  Placement verified by: chest auscultation and capnometry   Cuff volume (mL): 8  Measured from: lips  ETT/EBT  to lips (cm): 19  Number of attempts at approach: 1  Assessment: lips, teeth, and gum same as pre-op and atraumatic intubation    Additional Comments  Negative epigastric sounds, Breath sound equal bilaterally with symmetric chest rise and fall

## 2025-03-27 NOTE — OP NOTE
PROCEDURE(S):   1.  Left femoral arterial sheath placement.  2.  Left femoral venous sheath placement.  3. Temporary transvenous pacemaker insertion.  4. Catheter placement in the aortic root.  5. Multiple intraprocedural aortograms.   6. Transcatheter aortic valve replacement with 25 mm Abbott Navitor tissue valve.  .     INDICATIONS:   Severe/symptomatic aortic stenosis.    INTERVENTIONAL CARDIOLOGISTS:  Carolin Green MD.  Argentina Noel M.D.    CARDIAC SURGEONS:   Boni Tao MD    EBL: Less than 50 mL    DESCRIPTION OF PROCEDURE:   After informed consent, the patient was brought to the OR in a fasting condition, prepped and draped from level of chin to knees by standard surgical technique.  Left femoral arterial access was obtained by percutaneous anterior wall puncture technique and a 6-Indian arterial sheath was placed. Venous access was obtained in similar fashion and an 8-Indian venous sheath was placed. Temporary transvenous pacing electrode was inserted via the femoral venous access and advanced to the right ventricular apex and excellent pacing/sensing was noted. A pigtail catheter was advanced from the femoral arterial access and this was advanced to the aortic root, and multiple intraprocedural aortograms were performed.   At this time, right femoral arterial access was obtained by the surgeons and a 14 Indian Munoz sheath was placed(see separate note). Using this access, AL1 catheter, and straight wire, the aortic stenosis was crossed. The catheter was advanced into the left ventricle and the wire was exchanged for a Safari wire. Subsequently, a 25 mm Abbott Navitor was advanced using standard delivery system. This was positioned under fluoroscopy. After the satisfactory position was confirmed, the valve was gradually deployed.  Pacing was not required.  After satisfactory position was confirmed the valve was detached from the delivery system which was removed.  Post implant imaging revealed  no significant aortic valve gradient, no significant aortic insufficiency.  A mobile tissue like structure was noted in the LVOT which appeared to be from intimal disruption or chordal structure possibly from trauma caused by the guidewire or the delivery system.  It was repeatedly imaged and appeared stable and will be clinically monitored with future echocardiographic follow-up.  At the conclusion the arterial sheath was removed and the access site was closed by the surgeons (see separate note). The patient tolerated the procedure well and without complications.    FINAL IMPRESSION:   Successful transcatheter aortic valve replacement with 25 mm Abbott Navitor tissue valve.   No acute procedure-related complications.     Carolin Green MD, FACC, [facility] [place]

## 2025-03-28 ENCOUNTER — TRANSCRIBE ORDERS (OUTPATIENT)
Dept: CARDIAC REHAB | Facility: HOSPITAL | Age: 89
End: 2025-03-28
Payer: MEDICARE

## 2025-03-28 ENCOUNTER — APPOINTMENT (OUTPATIENT)
Dept: CARDIOLOGY | Facility: HOSPITAL | Age: 89
DRG: 267 | End: 2025-03-28
Payer: MEDICARE

## 2025-03-28 VITALS
HEIGHT: 64 IN | SYSTOLIC BLOOD PRESSURE: 118 MMHG | DIASTOLIC BLOOD PRESSURE: 50 MMHG | BODY MASS INDEX: 24.95 KG/M2 | OXYGEN SATURATION: 95 % | HEART RATE: 86 BPM | WEIGHT: 146.16 LBS | TEMPERATURE: 98.4 F | RESPIRATION RATE: 18 BRPM

## 2025-03-28 DIAGNOSIS — Z95.2 S/P TAVR (TRANSCATHETER AORTIC VALVE REPLACEMENT): Primary | ICD-10-CM

## 2025-03-28 LAB
ANION GAP SERPL CALCULATED.3IONS-SCNC: 11 MMOL/L (ref 5–15)
AORTIC DIMENSIONLESS INDEX: 0.69 (DI)
AV MEAN PRESS GRAD SYS DOP V1V2: 9 MMHG
AV VMAX SYS DOP: 215.8 CM/SEC
BH CV ECHO MEAS - AI P1/2T: 353.5 MSEC
BH CV ECHO MEAS - AO MAX PG: 18.6 MMHG
BH CV ECHO MEAS - AO V2 VTI: 49.5 CM
BH CV ECHO MEAS - AVA(I,D): 2.18 CM2
BH CV ECHO MEAS - EDV(CUBED): 64 ML
BH CV ECHO MEAS - EDV(MOD-SP2): 55.3 ML
BH CV ECHO MEAS - EDV(MOD-SP4): 67.2 ML
BH CV ECHO MEAS - EF(MOD-SP2): 80.1 %
BH CV ECHO MEAS - EF(MOD-SP4): 82 %
BH CV ECHO MEAS - ESV(CUBED): 19.7 ML
BH CV ECHO MEAS - ESV(MOD-SP2): 11 ML
BH CV ECHO MEAS - ESV(MOD-SP4): 12.1 ML
BH CV ECHO MEAS - FS: 32.5 %
BH CV ECHO MEAS - IVS/LVPW: 0.92 CM
BH CV ECHO MEAS - IVSD: 1.2 CM
BH CV ECHO MEAS - LAT PEAK E' VEL: 6.9 CM/SEC
BH CV ECHO MEAS - LV MASS(C)D: 175.8 GRAMS
BH CV ECHO MEAS - LV MAX PG: 8.8 MMHG
BH CV ECHO MEAS - LV MEAN PG: 4.7 MMHG
BH CV ECHO MEAS - LV V1 MAX: 148.7 CM/SEC
BH CV ECHO MEAS - LV V1 VTI: 34.3 CM
BH CV ECHO MEAS - LVIDD: 4 CM
BH CV ECHO MEAS - LVIDS: 2.7 CM
BH CV ECHO MEAS - LVOT AREA: 3.1 CM2
BH CV ECHO MEAS - LVOT DIAM: 2 CM
BH CV ECHO MEAS - LVPWD: 1.3 CM
BH CV ECHO MEAS - MED PEAK E' VEL: 5.4 CM/SEC
BH CV ECHO MEAS - MV A MAX VEL: 142 CM/SEC
BH CV ECHO MEAS - MV DEC TIME: 0.27 SEC
BH CV ECHO MEAS - MV E MAX VEL: 135 CM/SEC
BH CV ECHO MEAS - MV E/A: 0.95
BH CV ECHO MEAS - PI END-D VEL: 83.4 CM/SEC
BH CV ECHO MEAS - SV(LVOT): 107.7 ML
BH CV ECHO MEAS - SV(MOD-SP2): 44.3 ML
BH CV ECHO MEAS - SV(MOD-SP4): 55.1 ML
BH CV ECHO MEASUREMENTS AVERAGE E/E' RATIO: 21.95
BH CV VAS BP RIGHT ARM: NORMAL MMHG
BUN SERPL-MCNC: 18 MG/DL (ref 8–23)
BUN/CREAT SERPL: 32.1 (ref 7–25)
CALCIUM SPEC-SCNC: 8.1 MG/DL (ref 8.6–10.5)
CHLORIDE SERPL-SCNC: 104 MMOL/L (ref 98–107)
CO2 SERPL-SCNC: 22 MMOL/L (ref 22–29)
CREAT SERPL-MCNC: 0.56 MG/DL (ref 0.57–1)
DEPRECATED RDW RBC AUTO: 49.6 FL (ref 37–54)
EGFRCR SERPLBLD CKD-EPI 2021: 87.9 ML/MIN/1.73
ERYTHROCYTE [DISTWIDTH] IN BLOOD BY AUTOMATED COUNT: 14.4 % (ref 12.3–15.4)
GLUCOSE SERPL-MCNC: 109 MG/DL (ref 65–99)
HCT VFR BLD AUTO: 32.5 % (ref 34–46.6)
HGB BLD-MCNC: 10.6 G/DL (ref 12–15.9)
IVRT: 106 MS
LV EF BIPLANE MOD: 80.7 %
MAGNESIUM SERPL-MCNC: 2.7 MG/DL (ref 1.6–2.4)
MCH RBC QN AUTO: 30.7 PG (ref 26.6–33)
MCHC RBC AUTO-ENTMCNC: 32.6 G/DL (ref 31.5–35.7)
MCV RBC AUTO: 94.2 FL (ref 79–97)
PLATELET # BLD AUTO: 121 10*3/MM3 (ref 140–450)
PMV BLD AUTO: 10.2 FL (ref 6–12)
POTASSIUM SERPL-SCNC: 4.4 MMOL/L (ref 3.5–5.2)
POTASSIUM SERPL-SCNC: 4.4 MMOL/L (ref 3.5–5.2)
QT INTERVAL: 444 MS
QTC INTERVAL: 495 MS
RBC # BLD AUTO: 3.45 10*6/MM3 (ref 3.77–5.28)
SODIUM SERPL-SCNC: 137 MMOL/L (ref 136–145)
WBC NRBC COR # BLD AUTO: 10.22 10*3/MM3 (ref 3.4–10.8)

## 2025-03-28 PROCEDURE — 93010 ELECTROCARDIOGRAM REPORT: CPT | Performed by: INTERNAL MEDICINE

## 2025-03-28 PROCEDURE — 93325 DOPPLER ECHO COLOR FLOW MAPG: CPT

## 2025-03-28 PROCEDURE — 25010000002 FUROSEMIDE PER 20 MG: Performed by: PHYSICIAN ASSISTANT

## 2025-03-28 PROCEDURE — 93325 DOPPLER ECHO COLOR FLOW MAPG: CPT | Performed by: INTERNAL MEDICINE

## 2025-03-28 PROCEDURE — 84132 ASSAY OF SERUM POTASSIUM: CPT | Performed by: INTERNAL MEDICINE

## 2025-03-28 PROCEDURE — 93321 DOPPLER ECHO F-UP/LMTD STD: CPT

## 2025-03-28 PROCEDURE — 93321 DOPPLER ECHO F-UP/LMTD STD: CPT | Performed by: INTERNAL MEDICINE

## 2025-03-28 PROCEDURE — 83735 ASSAY OF MAGNESIUM: CPT | Performed by: INTERNAL MEDICINE

## 2025-03-28 PROCEDURE — 85027 COMPLETE CBC AUTOMATED: CPT | Performed by: PHYSICIAN ASSISTANT

## 2025-03-28 PROCEDURE — 93308 TTE F-UP OR LMTD: CPT | Performed by: INTERNAL MEDICINE

## 2025-03-28 PROCEDURE — 93005 ELECTROCARDIOGRAM TRACING: CPT | Performed by: PHYSICIAN ASSISTANT

## 2025-03-28 PROCEDURE — 99232 SBSQ HOSP IP/OBS MODERATE 35: CPT | Performed by: INTERNAL MEDICINE

## 2025-03-28 PROCEDURE — 25010000002 SULFUR HEXAFLUORIDE MICROSPH 60.7-25 MG RECONSTITUTED SUSPENSION

## 2025-03-28 PROCEDURE — 80048 BASIC METABOLIC PNL TOTAL CA: CPT | Performed by: PHYSICIAN ASSISTANT

## 2025-03-28 PROCEDURE — 99238 HOSP IP/OBS DSCHRG MGMT 30/<: CPT | Performed by: PHYSICIAN ASSISTANT

## 2025-03-28 PROCEDURE — 93308 TTE F-UP OR LMTD: CPT

## 2025-03-28 RX ORDER — FUROSEMIDE 10 MG/ML
40 INJECTION INTRAMUSCULAR; INTRAVENOUS ONCE
Status: COMPLETED | OUTPATIENT
Start: 2025-03-28 | End: 2025-03-28

## 2025-03-28 RX ORDER — ASPIRIN 81 MG/1
81 TABLET ORAL DAILY
Qty: 30 TABLET | Refills: 0 | Status: SHIPPED | OUTPATIENT
Start: 2025-03-28

## 2025-03-28 RX ORDER — ACETAMINOPHEN 325 MG/1
650 TABLET ORAL EVERY 6 HOURS PRN
Qty: 90 TABLET | Refills: 0 | Status: SHIPPED | OUTPATIENT
Start: 2025-03-28

## 2025-03-28 RX ADMIN — SULFUR HEXAFLUORIDE 4 ML: KIT at 09:04

## 2025-03-28 RX ADMIN — FUROSEMIDE 40 MG: 10 INJECTION, SOLUTION INTRAMUSCULAR; INTRAVENOUS at 08:37

## 2025-03-28 RX ADMIN — LOSARTAN POTASSIUM 50 MG: 50 TABLET, FILM COATED ORAL at 08:34

## 2025-03-28 RX ADMIN — MULTIVITAMIN TABLET 1 TABLET: TABLET at 08:33

## 2025-03-28 RX ADMIN — LEVOTHYROXINE SODIUM 75 MCG: 0.07 TABLET ORAL at 08:34

## 2025-03-28 RX ADMIN — Medication 2000 UNITS: at 08:35

## 2025-03-28 RX ADMIN — FERROUS SULFATE TAB 325 MG (65 MG ELEMENTAL FE) 325 MG: 325 (65 FE) TAB at 08:34

## 2025-03-28 RX ADMIN — CYANOCOBALAMIN TAB 1000 MCG 2000 MCG: 1000 TAB at 08:35

## 2025-03-28 RX ADMIN — ASPIRIN 81 MG: 81 TABLET, COATED ORAL at 08:36

## 2025-03-28 RX ADMIN — PANTOPRAZOLE SODIUM 40 MG: 40 TABLET, DELAYED RELEASE ORAL at 08:34

## 2025-03-28 RX ADMIN — CALCIUM 500 MG: 500 TABLET ORAL at 08:36

## 2025-03-28 NOTE — PROGRESS NOTES
[facility]  IP Progress Note      Chief Complaint: Follow-up of VHD/status post TAVR.    Subjective   Resting in bed, has not ambulated yet, denies chest pain or shortness of breath.    Objective     Blood pressure 118/63, pulse 80, temperature 98 °F (36.7 °C), temperature source Oral, resp. rate 14, SpO2 96%.     Intake/Output Summary (Last 24 hours) at 3/28/2025 0652  Last data filed at 3/28/2025 0400  Gross per 24 hour   Intake 1389 ml   Output 1100 ml   Net 289 ml       Physical Exam:  General: No acute distress.   Neck: no JVD.  Chest:No respiratory distress, breath sounds are normal. No wheezes,  rhonchi or rales.  Cardiovascular: Normal S1 and S2, faint systolic murmur.  Extremities: No edema.  Both groins are stable, no bleeding or hematoma    Results Review:     I reviewed the patient's new clinical results.    Results from last 7 days   Lab Units 03/28/25  0324   WBC 10*3/mm3 10.22   HEMOGLOBIN g/dL 10.6*   HEMATOCRIT % 32.5*   PLATELETS 10*3/mm3 121*     Results from last 7 days   Lab Units 03/28/25  0343 03/27/25  0906 03/26/25  1237   SODIUM mmol/L 137   < > 136   POTASSIUM mmol/L 4.4  4.4   < > 4.6   CHLORIDE mmol/L 104   < > 98   CO2 mmol/L 22.0   < > 27.0   BUN mg/dL 18   < > 17   CREATININE mg/dL 0.56*   < > 0.63   CALCIUM mg/dL 8.1*   < > 9.3   BILIRUBIN mg/dL  --   --  0.6   ALK PHOS U/L  --   --  79   ALT (SGPT) U/L  --   --  21   AST (SGOT) U/L  --   --  26   GLUCOSE mg/dL 109*   < > 86    < > = values in this interval not displayed.     Results from last 7 days   Lab Units 03/28/25  0343   SODIUM mmol/L 137   POTASSIUM mmol/L 4.4  4.4   CHLORIDE mmol/L 104   CO2 mmol/L 22.0   BUN mg/dL 18   CREATININE mg/dL 0.56*   GLUCOSE mg/dL 109*   CALCIUM mg/dL 8.1*     Results from last 7 days   Lab Units 03/26/25  1237   INR  1.26*     aspirin, 81 mg, Oral, Daily  atorvastatin, 20 mg, Oral, Nightly  calcium carbonate (oyster shell), 500 mg, Oral, Daily  [Held by  provider] carvedilol, 25 mg, Oral, BID  cholecalciferol, 2,000 Units, Oral, Daily  ferrous sulfate, 325 mg, Oral, Daily  losartan, 50 mg, Oral, Q24H   And  [Held by provider] hydroCHLOROthiazide, 12.5 mg, Oral, Q24H  levothyroxine, 75 mcg, Oral, Q AM  multivitamin, 1 tablet, Oral, Daily  pantoprazole, 40 mg, Oral, Q AM  vitamin B-12, 2,000 mcg, Oral, Daily       Tele: Sinus Rythym      Assessment:  Severe aortic stenosis, now status post TAVR with 25 mm Abbott Navitor tissue valve, stable postprocedure course.  Hypertension, controlled.  Dyslipidemia, on statin therapy.    Plan:  Stable postprocedure course, wean off oxygen, ambulate and okay to discharge to home today if all agree.  She will continue her usual home medications.  Follow-up with Dr. Tao and CT surgery per their recommendations, follow-up with me at [place] office in 6 to 8 weeks.    Carolin Green MD, FACC, [facility] [place]

## 2025-03-28 NOTE — PLAN OF CARE
Goal Outcome Evaluation:           Progress: improving  Outcome Evaluation: Patient had TAVR today, out to 2H ICU at 08:55. On cardene. On 2L NC. BR until Dr. Tao sees patient in the morning. VSS at this time.

## 2025-03-28 NOTE — PROGRESS NOTES
Pt. Referred for Phase II Cardiac Rehab. Staff discussed benefits of exercise, program protocol, and educational material provided. Teach back verified.  Permission granted from patient for staff to fax referral information to outlying program at this time.  Staff faxed referral info to [facility].

## 2025-03-28 NOTE — CASE MANAGEMENT/SOCIAL WORK
Discharge Planning Assessment  [facility]     Patient Name: Patricia Redmond  MRN: [id number]  Today's Date: 3/28/2025    Admit Date: 3/27/2025    Plan: Home with sister's assistance   Discharge Needs Assessment       Row Name 03/28/25 1021       Living Environment    People in Home alone  lives alone, however, sister from [place] will be staying with her    Name(s) of People in Home Jason Mo Daughter [phone] - daughter resides in [facility] and assists with care as needed    Current Living Arrangements home    Potentially Unsafe Housing Conditions none    In the past 12 months has the electric, gas, oil, or water company threatened to shut off services in your home? No    Primary Care Provided by self    Provides Primary Care For no one    Family Caregiver if Needed sibling(s);child(chase), adult    Family Caregiver Names Jason Mo [phone]    Quality of Family Relationships helpful;involved;supportive    Able to Return to Prior Arrangements yes       Resource/Environmental Concerns    Resource/Environmental Concerns none    Transportation Concerns none       Transportation Needs    In the past 12 months, has lack of transportation kept you from medical appointments or from getting medications? no    In the past 12 months, has lack of transportation kept you from meetings, work, or from getting things needed for daily living? No       Food Insecurity    Within the past 12 months, you worried that your food would run out before you got the money to buy more. Never true    Within the past 12 months, the food you bought just didn't last and you didn't have money to get more. Never true       Transition Planning    Patient/Family Anticipates Transition to home with family    Patient/Family Anticipated Services at Transition none    Transportation Anticipated family or friend will provide       Discharge Needs Assessment    Readmission Within the Last 30 Days no previous admission in last 30 days     Equipment Currently Used at Home walker, rolling;cane, straight    Concerns to be Addressed denies needs/concerns at this time    Do you want help finding or keeping work or a job? I do not need or want help    Do you want help with school or training? For example, starting or completing job training or getting a high school diploma, GED or equivalent No    Anticipated Changes Related to Illness none    Equipment Needed After Discharge none    Discharge Facility/Level of Care Needs other (see comments)  cardiac rehab    Current Discharge Risk lives alone                   Discharge Plan       Row Name 03/28/25 1026       Plan    Plan Home with sister's assistance    Patient/Family in Agreement with Plan yes    Plan Comments CM spoke with patient at bedside regarding Dc planning. Patient resides in [facility], alone. Patient's sister is here visiting from [place] and will be staying with patient for a few weeks. Patient's daughter resides in [facility] and assists patient with groceries, etc as needed. Patient is independent with ADL's, has a cane and a rolling walker in her home. Patient denies any current home health or outpatient services. Patient has medical insurance, prescription coverage and is able to afford/obtain medications without difficulty. Patient has no advanced directives. CM called cardiac rehab dept and they will see patient and make arrangements/appointments for cardiac rehab.  Family to provide transportation to home.    Final Discharge Disposition Code 01 - home or self-care                    Expected Discharge Date and Time       Expected Discharge Date Expected Discharge Time    Mar 28, 2025            Demographic Summary       Row Name 03/28/25 1020       General Information    Arrived From home    Referral Source physician    Reason for Consult discharge planning    Preferred Language English       Contact Information    Contact Information Comments Jason Mo Daughter [phone]                    Functional Status       Row Name 03/28/25 1020       Functional Status    Usual Activity Tolerance good    Current Activity Tolerance moderate       Physical Activity    On average, how many days per week do you engage in moderate to strenuous exercise (like a brisk walk)? 0 days    On average, how many minutes do you engage in exercise at this level? 0 min    Number of minutes of exercise per week 0       Assessment of Health Literacy    How often do you have someone help you read hospital materials? Occasionally    How often do you have problems learning about your medical condition because of difficulty understanding written information? Occasionally    How often do you have a problem understanding what is told to you about your medical condition? Occasionally    How confident are you filling out medical forms by yourself? Quite a bit    Health Literacy Good       Functional Status, IADL    Medications independent    Meal Preparation independent    Housekeeping independent    Laundry independent    Shopping assistive person    If for any reason you need help with day-to-day activities such as bathing, preparing meals, shopping, managing finances, etc., do you get the help you need? I get all the help I need       Mental Status    General Appearance WDL WDL       Mental Status Summary    Recent Changes in Mental Status/Cognitive Functioning no changes       Employment/    Employment Status retired                   Psychosocial    No documentation.                  Abuse/Neglect    No documentation.                  Legal    No documentation.                  Substance Abuse    No documentation.                  Patient Forms    No documentation.                     Tati Nguyen RN

## 2025-03-28 NOTE — PROGRESS NOTES
Intensive Care Follow-up     Hospital:  LOS: 1 day   Ms. Patricia Redmond, 89 y.o. female is followed for:   Nonrheumatic aortic valve stenosis            History of present illness:   89-year-old female with a history of severe aortic stenosis, peripheral vascular disease, hypertension, hyperlipidemia, and hypothyroidism. Who presents to the [facility] ICU status post TAVR. Seen on arrival. Patient still somewhat sedated.       Subjective   Interval History:  Patient doing well this morning answering all questions.  Had not been out of bed yet but getting her echo this morning.             The patient's past medical, surgical and social history were reviewed and updated in Epic as appropriate.       Objective     Infusions:  niCARdipine, 5-15 mg/hr, Last Rate: 5 mg/hr (03/27/25 2309)  phenylephrine, 0.5-3 mcg/kg/min      Medications:  aspirin, 81 mg, Oral, Daily  atorvastatin, 20 mg, Oral, Nightly  calcium carbonate (oyster shell), 500 mg, Oral, Daily  [Held by provider] carvedilol, 25 mg, Oral, BID  cholecalciferol, 2,000 Units, Oral, Daily  ferrous sulfate, 325 mg, Oral, Daily  furosemide, 40 mg, Intravenous, Once  losartan, 50 mg, Oral, Q24H   And  [Held by provider] hydroCHLOROthiazide, 12.5 mg, Oral, Q24H  levothyroxine, 75 mcg, Oral, Q AM  multivitamin, 1 tablet, Oral, Daily  pantoprazole, 40 mg, Oral, Q AM  vitamin B-12, 2,000 mcg, Oral, Daily      I reviewed the patient's medications.    Vital Sign Min/Max for last 24 hours  Temp  Min: 94 °F (34.4 °C)  Max: 98.2 °F (36.8 °C)   BP  Min: 94/54  Max: 124/60   Pulse  Min: 65  Max: 94   Resp  Min: 12  Max: 16   SpO2  Min: 92 %  Max: 99 %   Flow (L/min) (Oxygen Therapy)  Min: 2  Max: 6       Input/Output for last 24 hour shift  03/27 0701 - 03/28 0700  In: 1389 [P.O.:120; I.V.:1169]  Out: 1190 [Urine:1190]   S RR:  [5-10] 6  GENERAL : NAD, conversant  RESPIRATORY/THORAX : normal respiratory effort and no intercostal retractions, CTAB  CARDIOVASCULAR :  Normal S1/S2, RRR. no lower ext edema.  GASTROINTESTINAL : Soft, NT/ND. BS x 4 normoactive. No hepatosplenomegaly.  MUSCULOSKELETAL : No cyanosis, clubbing, or ischemia  NEUROLOGICAL: alert and oriented to person, place and time  PSYCHOLOGICAL : Appropriate affect    Results from last 7 days   Lab Units 03/28/25  0324 03/27/25  0906 03/26/25  1237   WBC 10*3/mm3 10.22 9.76 6.76   HEMOGLOBIN g/dL 10.6* 11.0* 11.9*   PLATELETS 10*3/mm3 121* 128* 149     Results from last 7 days   Lab Units 03/28/25  0343 03/27/25  0906 03/26/25  1237   SODIUM mmol/L 137 137 136   POTASSIUM mmol/L 4.4  4.4 3.9 4.6   CO2 mmol/L 22.0 25.0 27.0   BUN mg/dL 18 18 17   CREATININE mg/dL 0.56* 0.65 0.63   MAGNESIUM mg/dL 2.7* 1.8 1.9   GLUCOSE mg/dL 109* 117* 86     Estimated Creatinine Clearance: 65 mL/min (A) (by C-G formula based on SCr of 0.56 mg/dL (L)).          I reviewed the patient's new clinical results.  I reviewed the patient's new imaging results/reports including actual images and agree with reports.       Assessment & Plan   Impression        Nonrheumatic aortic valve stenosis    Essential hypertension    Peripheral vascular disease    Dyslipidemia    Hypothyroidism (acquired)    Aortic valve stenosis       Plan        89-year-old female with a history of severe aortic stenosis, peripheral vascular disease, hypertension, hyperlipidemia, and hypothyroidism.  Who presents to the [facility] ICU status post TAVR.     -Postop orders per CT surgery  -Continue antihypertensive medications  -Statin for hyperlipidemia  -Continue levothyroxine for hypothyroidism  -Cardiology following  -If patient can ambulate then she is okay for discharge.    I conducted multidisciplinary rounds and the plan of care was discussed with the multidisciplinary team at that time. In attendance at multidisciplinary rounds was clinical pharmacist, dietitian, nursing staff, and case management.    I discussed the patient's findings and my  recommendations with patient, family, and nursing staff       Helga Dejesus DO  Pulmonary, Critical care and Sleep Medicine

## 2025-03-28 NOTE — PROGRESS NOTES
CTS Progress Note       LOS: 1 day   Patient Care Team:  Emmett Martines MD as PCP - General (Internal Medicine)  Carolin Green MD as Consulting Physician (Cardiology)  Sierra Leroy PA-C as Physician Assistant (Physician Assistant)    Chief Complaint: Nonrheumatic aortic valve stenosis    Vital Signs:  Temp:  [94 °F (34.4 °C)-98.2 °F (36.8 °C)] 98 °F (36.7 °C)  Heart Rate:  [65-94] 80  Resp:  [12-16] 14  BP: ()/(47-73) 118/63  Arterial Line BP: ()/(32-60) 97/60    Physical Exam: Somewhat lethargic but neurologically intact and groin sites are satisfactory       Results:     Results from last 7 days   Lab Units 03/28/25  0324   WBC 10*3/mm3 10.22   HEMOGLOBIN g/dL 10.6*   HEMATOCRIT % 32.5*   PLATELETS 10*3/mm3 121*     Results from last 7 days   Lab Units 03/28/25  0343   SODIUM mmol/L 137   POTASSIUM mmol/L 4.4  4.4   CHLORIDE mmol/L 104   CO2 mmol/L 22.0   BUN mg/dL 18   CREATININE mg/dL 0.56*   GLUCOSE mg/dL 109*   CALCIUM mg/dL 8.1*           Imaging Results (Last 24 Hours)       ** No results found for the last 24 hours. **            Assessment      Nonrheumatic aortic valve stenosis    Essential hypertension    Peripheral vascular disease    Dyslipidemia    Hypothyroidism (acquired)    Aortic valve stenosis        Plan   Lasix 40 mg IV x 1  Discontinue Rose catheter  If ambulates well should be able to discharge home by this afternoon if satisfactory with cardiology    Please note that portions of this note were completed with a voice recognition program. Efforts were made to edit the dictations, but occasionally words are mistranscribed.    Boni Tao MD  03/28/25  07:01 EDT

## 2025-03-30 LAB
BH BB BLOOD EXPIRATION DATE: NORMAL
BH BB BLOOD EXPIRATION DATE: NORMAL
BH BB BLOOD TYPE BARCODE: 7300
BH BB BLOOD TYPE BARCODE: 7300
BH BB DISPENSE STATUS: NORMAL
BH BB DISPENSE STATUS: NORMAL
BH BB PRODUCT CODE: NORMAL
BH BB PRODUCT CODE: NORMAL
BH BB UNIT NUMBER: NORMAL
BH BB UNIT NUMBER: NORMAL
CROSSMATCH INTERPRETATION: NORMAL
CROSSMATCH INTERPRETATION: NORMAL
QT INTERVAL: 480 MS
QTC INTERVAL: 518 MS
UNIT  ABO: NORMAL
UNIT  ABO: NORMAL
UNIT  RH: NORMAL
UNIT  RH: NORMAL

## 2025-03-31 NOTE — DISCHARGE SUMMARY
Discharge Summary Transcatheter Valve Replacement    Date of Admission: 3/27/2025  Date of Discharge: 3/28/2025    PCP: Emmett Martines MD  ATTENDING: Boni Tao MD    Consults:   Consulting Physician(s)         Provider   Role Specialty     Carolin Green MD      Consulting Physician Cardiology            Structural Heart Team  1.  Branden Muro MD  2.  Boni Tao MD  3.  Carolin Green MD  4.  Argentina Noel MD  5.  Reynaldo Self MD  8.  JOSEPH Castillo    Presenting Problem/ HPI:   Patricia Flakito Ruy    Discharge Diagnosis:     Nonrheumatic aortic valve stenosis    Essential hypertension    Peripheral vascular disease    Dyslipidemia    Hypothyroidism (acquired)    Aortic valve stenosis      Procedures Performed:   Procedure(s):  TRANSCATHETER AORTIC VALVE REPLACEMENT / DORIS  Transfemoral Transcatheter Aortic Valve Replacement    Hospital Course:The patient is an 89 y.o. female from with symptoms of severe aortic stenosis as noted above in the HPI.  An echocardiogram revealed the following:  Results for orders placed during the hospital encounter of 03/27/25    Adult Transthoracic Echo Limited W/ Cont if Necessary Per Protocol    Interpretation Summary    Left ventricular systolic function is hyperdynamic (EF > 70%).    Left ventricular wall thickness is consistent with mild concentric hypertrophy.    The left atrial cavity is mild to moderately dilated.    There is a normally functioning bioprosthetic TAVR valve present.  Mean gradient 9 mmHg, there is trace aortic insufficiency.    Mild mitral regurgitation.    Mobile echogenic structure is noted in the left ventricular outflow track which was also seen in post TAVR DORIS.  This is most likely consistent with a chordal structure or an intimal flap and does not appear hemodynamically significant.      The patient was evaluated and deemed to be at greater risk of mortality with traditional open AVR primarily based upon physical exam,  laboratory studies, and imaging findings.   The patient was admitted the morning of the scheduled OR procedure.    Procedure:     Physical Exam on date of discharge: ***    Pertinent Test Results:   Results from last 7 days   Lab Units 03/28/25  0324   WBC 10*3/mm3 10.22   HEMOGLOBIN g/dL 10.6*   HEMATOCRIT % 32.5*   PLATELETS 10*3/mm3 121*     Results from last 7 days   Lab Units 03/28/25  0343   SODIUM mmol/L 137   POTASSIUM mmol/L 4.4  4.4   CHLORIDE mmol/L 104   CO2 mmol/L 22.0   BUN mg/dL 18   CREATININE mg/dL 0.56*   GLUCOSE mg/dL 109*   CALCIUM mg/dL 8.1*       Discharge Disposition  Home or Self Care    Discharge Medications     Discharge Medications        New Medications        Instructions Start Date   acetaminophen 325 MG tablet  Commonly known as: TYLENOL   650 mg, Oral, Every 6 Hours PRN      Aspirin Low Dose 81 MG EC tablet  Generic drug: aspirin   81 mg, Oral, Daily             Continue These Medications        Instructions Start Date   atorvastatin 20 MG tablet  Commonly known as: LIPITOR   20 mg, Nightly      calcium carbonate-vitamin d 600-400 MG-UNIT per tablet   1 tablet, 2 Times Daily      carvedilol 25 MG tablet  Commonly known as: COREG   25 mg, Oral, 2 Times Daily      carvedilol 25 MG tablet  Commonly known as: COREG   25 mg, Nightly      cilostazol 50 MG tablet  Commonly known as: PLETAL   50 mg, 2 Times Daily      estradiol 0.1 MG/GM vaginal cream  Commonly known as: ESTRACE   2 g, 3 Times Weekly      ezetimibe 10 MG tablet  Commonly known as: ZETIA   10 mg, Daily      ferrous sulfate 325 (65 FE) MG tablet   325 mg, Daily      lansoprazole 30 MG capsule  Commonly known as: PREVACID   30 mg, Every Evening      levothyroxine 75 MCG tablet  Commonly known as: SYNTHROID, LEVOTHROID   75 mcg, Take As Directed      losartan-hydrochlorothiazide 50-12.5 MG per tablet  Commonly known as: HYZAAR   1 tablet, Daily      multivitamin tablet tablet  Commonly known as: THERAGRAN   1 tablet, Daily       OCUVITE ADULT FORMULA PO   1 tablet, 2 Times Daily      vitamin B-12 1000 MCG tablet  Commonly known as: CYANOCOBALAMIN   2,000 mcg, Daily      Vitamin D (Cholecalciferol) 25 MCG (1000 UT) capsule   2,000 Units, Daily      vitamin E 400 UNIT capsule   400 Units, Daily      warfarin 5 MG tablet  Commonly known as: COUMADIN  Notes to patient: Resume on 3/29   5 mg, Daily Warfarin               Discharge Diet:     Activity at Discharge:   Activity Instructions       Bathing Restrictions      Showering is permitted. No tub baths, swimming pools, hot tubs until your surgeon approves.    Type of Restriction: Bathing    Bathing Restrictions: No Tub Bath    Driving Restrictions      Type of Restriction: Driving    Driving Restrictions: No Driving (Time Limited)    Length: 1 Week    Lifting Restrictions      Type of Restriction: Lifting    Lifting Restrictions: Other    Explain Lifing Restrictions: Do not lift anything heavier than 10 pounds for 1 week (a gallon of milk weighs 8 pounds).    Other Activity Instructions      Walking is one of the best ways to get   stronger after your TAVR. Start with a 5  minute walk 3-4 times a day. Walk a little   more each day.     Climbing stairs at a slow pace is okay            Special Instructions Following Valve Procedure   Check incision/groin sites daily. Clean daily with a clean washcloth, soap and water. Pat dry. Do not scrub. Wear loose fitting clothing over groin incision site until healed.  Shower:  May shower daily, but no tub baths, hot tubs or pools until Cardiac (CT) Surgeon approves.   Walk daily starting with a 5 minute walk four times daily.  Increase walking by 1-2 minutes per walk as tolerated. No strenuous activity until CT Surgeon approves.   No lifting over 10 lbs for 7 days.  No driving for 7 days unless your physician tells you otherwise.   Heart valve infection: Tell your doctors/dentists that your heart valve has been replaced before any procedures or dental  work. You will be given a special wallet card at discharge to show your doctor/dentist. The card provides recommendations on when antibiotics are needed and the dose.    Dental care: Reduce your risk of heart valve infection by brushing your teeth twice a day, using dental floss and seeing your dentist at least twice a year.   Monitor your heart rate, blood pressure and weight. Weigh yourself first thing in the morning wearing similar weight clothing. Report a weight gain of 3 pounds or more in a 24 hour period or 5 pounds in one week to your CT surgeon. Record your numbers and bring to your doctor appointments.  Report groin/incision site redness, drainage, swelling and/or significant tenderness, leg/feet swelling, chills and/or fever of 101 degrees at anytime or 100 for more than 24 hours, chest pain or increased shortness of breath to [facility] CT Surgery at [phone].   Call CT Surgery Office for all questions or concerns at [phone].     Follow-up Appointments  Future Appointments   Date Time Provider Department Center   4/14/2025  2:30 PM Cinthia Garcia APRN [facility]   5/13/2025 11:45 AM Carolin Green MD [facility]   9/25/2025 11:00 AM Dana Capps APRN [facility]     Additional Instructions for the Follow-ups that You Need to Schedule       Call MD With Problems / Concerns   As directed      Monitor your heart rate, blood pressure,   and weight. Weigh yourself first thing in   the morning wearing similar weight   clothing. If you gain 3 pounds or more in   24 hours or 5 pounds or more in one   week, call your cardiac surgeon. Record   your numbers and bring to your doctor   appointments.   Call the cardiac surgery office for   groin/incision site redness, drainage,   swelling and/or significant tenderness,   leg swelling, chills and/or fever of 101   degrees or higher at any time or 100   degrees for more than 24 hours.    If you have any of the following    CALL 911- Don't Drive   ~Chest pain or trouble breathing  ~Sudden numbness or weakness in your   face, arms, or legs   ~Shortness of breath that doesn't get better   when you rest  ~Heart rate faster than 120 beats per minute   with shortness of breath  ~Heart rate lower than 50 beats per minute   or a new irregular heart rate  ~Bowel movement that is dark black or   bright red    Order Comments: Monitor your heart rate, blood pressure, and weight. Weigh yourself first thing in the morning wearing similar weight clothing. If you gain 3 pounds or more in 24 hours or 5 pounds or more in one week, call your cardiac surgeon. Record your numbers and bring to your doctor appointments. Call the cardiac surgery office for groin/incision site redness, drainage, swelling and/or significant tenderness, leg swelling, chills and/or fever of 101 degrees or higher at any time or 100 degrees for more than 24 hours.  If you have any of the following CALL 911- Don't Drive ~Chest pain or trouble breathing ~Sudden numbness or weakness in your face, arms, or legs ~Shortness of breath that doesn't get better when you rest ~Heart rate faster than 120 beats per minute with shortness of breath ~Heart rate lower than 50 beats per minute or a new irregular heart rate ~Bowel movement that is dark black or bright red         Discharge Follow-up with Specialty: 1 Month; Cardiothoracic Surgery   As directed      Follow Up: 1 Month   Follow Up Details: Follow Up in 2-4 Weeks   Specialty: Cardiothoracic Surgery        Discharge Follow-up with Specialty: Cardiology; 1 Month   As directed      Specialty: Cardiology   Follow Up: 1 Month        Discharge Follow-up with Specialty: Dr. Green at [place] office   As directed      Specialty: Dr. Green at [place] office   Follow Up Details: In 6 to 8 weeks.        Discharge Follow-up with Specialty: [facility]; 1 Week   As directed      Specialty: [facility]   Follow Up: 1  Week   Follow Up Details: Follow Up With [facility] Within 7 Days of Discharge. If Discharged on a Weekend, Schedule Follow Up For The Following Friday at 0900.        Cardiac Rehab Evaluation and Enrollment   Apr 02, 2025      Reason for Consult: Education                Time spent for discharge: 30 minutes    Thank you for allowing the [facility] Structural Heart Team to participate in your care.    If you have any questions or concerns please call:     Cardiothoracic Surgery   Dr. Mruo/Dinh at [phone]    Cardiology  Dr. Noel/Norma at [phone]    Dr. Self at [phone]    Liz Almaraz PA-C  03/31/25  16:56 EDT

## 2025-04-01 ENCOUNTER — DOCUMENTATION (OUTPATIENT)
Dept: CARDIOLOGY | Facility: HOSPITAL | Age: 89
End: 2025-04-01
Payer: MEDICARE

## 2025-04-01 LAB
ACT BLD: 124 SECONDS (ref 82–152)
ACT BLD: 268 SECONDS (ref 82–152)
BASE EXCESS BLDA CALC-SCNC: -1 MMOL/L (ref -5–5)
CA-I BLDA-SCNC: 1.21 MMOL/L (ref 1.2–1.32)
CO2 BLDA-SCNC: 25 MMOL/L (ref 24–29)
GLUCOSE BLDC GLUCOMTR-MCNC: 91 MG/DL (ref 70–130)
HCO3 BLDA-SCNC: 23.7 MMOL/L (ref 22–26)
HCT VFR BLDA CALC: 31 % (ref 38–51)
HGB BLDA-MCNC: 10.5 G/DL (ref 12–17)
PCO2 BLDA: 39.2 MM HG (ref 35–45)
PH BLDA: 7.39 PH UNITS (ref 7.35–7.6)
PO2 BLDA: 72 MMHG (ref 80–105)
POTASSIUM BLDA-SCNC: 3.8 MMOL/L (ref 3.5–4.9)
SAO2 % BLDA: 94 % (ref 95–98)
SODIUM BLD-SCNC: 137 MMOL/L (ref 138–146)

## 2025-04-01 NOTE — PROGRESS NOTES
Called patient after TAVR on 3/27. She reports feeling very well after the procedure, VSS and able to increase her daily activities with minimal VERDUZCO. She is anticipating cardiac rehab orientation on 4/21 to improve her stamina. We reviewed her upcoming apts and requested that scheduling arrange her TTE in [facility] or [place]. She has our contact information for any future questions or concerns.

## 2025-04-11 NOTE — PROGRESS NOTES
"     [facility] Cardiothoracic Surgery Office Follow Up Note     Date of Encounter: 2025     Name: Patricia Redmond  : 1935     Referred By: No ref. provider found  PCP: Emmett Martines MD    Chief Complaint:    Chief Complaint   Patient presents with    aortic valve stenosis     Hospital follow up s/p TAVR 3/27/25.       Subjective      History of Present Illness:    Patricia Redmond is a 89 y.o. female non-smoker with history of HTN, HLD on statin therapy, DVT, TIA, PAD, severe aortic valve stenosis s/p TAVR with Abbott #25 valve on 3/27/2025 by Dr. Tao.  Patient had uneventful postoperative course and was discharged on POD #1 with no readmissions.  She presents today for post-op eval. She is doing \"super good\" and has had no problems since going home. She denies chest pain, palpitations, SOA/VERDUZCO, or groin healing issues. She is active at home doing dishes and sweeping without activity intolerance. She is anticipating starting cardiac rehab in the next few months. She has follow-up with lashay Green .     Review of Systems:  Review of Systems   Constitutional: Negative. Negative for chills, decreased appetite, diaphoresis, fever, malaise/fatigue, night sweats, weight gain and weight loss.   HENT: Negative.  Negative for hoarse voice.    Eyes: Negative.  Negative for blurred vision, double vision and visual disturbance.   Cardiovascular:  Positive for leg swelling. Negative for chest pain, claudication, dyspnea on exertion, irregular heartbeat, near-syncope, orthopnea, palpitations, paroxysmal nocturnal dyspnea and syncope.   Respiratory:  Positive for cough. Negative for hemoptysis, shortness of breath, sputum production and wheezing.    Hematologic/Lymphatic: Negative for adenopathy and bleeding problem. Bruises/bleeds easily.   Skin: Negative.  Negative for color change, nail changes, poor wound healing and rash.   Musculoskeletal: Negative.  Negative for back pain, falls and muscle " cramps.   Gastrointestinal: Negative.  Negative for abdominal pain, dysphagia and heartburn.   Genitourinary: Negative.  Negative for flank pain.   Neurological: Negative.  Negative for brief paralysis, disturbances in coordination, dizziness, focal weakness, headaches, light-headedness, loss of balance, numbness, paresthesias, sensory change, vertigo and weakness.   Psychiatric/Behavioral: Negative.  Negative for depression and suicidal ideas.    Allergic/Immunologic: Negative.  Negative for persistent infections.       I have reviewed the following portions of the patient's history: problem list, current medications, allergies, past surgical history, past medical history, past social history, past family history, and ROS and confirm it's accurate.    Allergies:  No Known Allergies    Medications:      Current Outpatient Medications:     acetaminophen (TYLENOL) 325 MG tablet, Take 2 tablets by mouth Every 6 (Six) Hours As Needed for Mild Pain., Disp: 90 tablet, Rfl: 0    aspirin 81 MG EC tablet, Take 1 tablet by mouth Daily., Disp: 30 tablet, Rfl: 0    atorvastatin (LIPITOR) 20 MG tablet, Take 1 tablet by mouth Every Night., Disp: , Rfl: 3    calcium carbonate-vitamin d 600-400 MG-UNIT per tablet, Take 1 tablet by mouth 2 (Two) Times a Day., Disp: , Rfl:     carvedilol (COREG) 25 MG tablet, Take 1 tablet by mouth 2 (Two) Times a Day. (Patient taking differently: Take 0.5 tablets by mouth Daily. 1/2 tab at NOON, AND 1 whole tab at QHS), Disp: 180 tablet, Rfl: 3    carvedilol (COREG) 25 MG tablet, Take 1 tablet by mouth Every Night., Disp: , Rfl:     cilostazol (PLETAL) 50 MG tablet, Take 1 tablet by mouth 2 (Two) Times a Day., Disp: , Rfl:     estradiol (ESTRACE) 0.1 MG/GM vaginal cream, Insert 2 g into the vagina 3 (Three) Times a Week., Disp: , Rfl:     ezetimibe (ZETIA) 10 MG tablet, Take 1 tablet by mouth Daily., Disp: , Rfl: 2    ferrous sulfate 325 (65 FE) MG tablet, Take 1 tablet by mouth Daily. 1 tablet  Daily, Disp: , Rfl:     lansoprazole (PREVACID) 30 MG capsule, Take 1 capsule by mouth Every Evening., Disp: , Rfl:     levothyroxine (SYNTHROID, LEVOTHROID) 75 MCG tablet, Take 1 tablet by mouth Take As Directed. 75mcg daily except ON SUNDAY, TAKES 1.5 TABS, Disp: , Rfl: 3    losartan-hydrochlorothiazide (HYZAAR) 50-12.5 MG per tablet, Take 1 tablet by mouth Daily., Disp: , Rfl:     Multiple Vitamin (MULTI-VITAMIN DAILY PO), Take 1 tablet by mouth Daily., Disp: , Rfl:     Multiple Vitamins-Minerals (OCUVITE ADULT FORMULA PO), Take 1 tablet by mouth 2 (Two) Times a Day., Disp: , Rfl:     vitamin B-12 (CYANOCOBALAMIN) 1000 MCG tablet, Take 2 tablets by mouth Daily., Disp: , Rfl:     Vitamin D, Cholecalciferol, 1000 units capsule, Take 2 capsules by mouth Daily., Disp: , Rfl:     vitamin E 400 UNIT capsule, Take 1 capsule by mouth Daily., Disp: , Rfl:     warfarin (COUMADIN) 5 MG tablet, Take 1 tablet by mouth Daily., Disp: , Rfl:   No current facility-administered medications for this visit.    Facility-Administered Medications Ordered in Other Visits:     Chlorhexidine Gluconate Cloth 2 % pads 1 Application, 1 Application, Topical, Q12H PRN, Liz Almaraz PA-C    History:   Past Medical History:   Diagnosis Date    Anesthesia complication     slow to awaken    Aortic valve stenosis     Deep vein thrombosis     warfarin    Degenerative disc disease, lumbar     Dyslipidemia     GERD (gastroesophageal reflux disease)     Hypertension     Hypothyroidism     Inguinal hernia     Macular degeneration     Osteoporosis     Peripheral vascular disease     PONV (postoperative nausea and vomiting)     Pseudoxanthoma elasticum     TIA (transient ischemic attack)        Past Surgical History:   Procedure Laterality Date    AORTIC VALVE REPAIR/REPLACEMENT N/A 3/27/2025    Procedure: TRANSCATHETER AORTIC VALVE REPLACEMENT / DORIS;  Surgeon: Boni Tao MD;  Location: [facility];  Service: Cardiothoracic;   "Laterality: N/A;  FL-17 MIN 36SEC  DOSE-327 MGY   CONTRAST-45ML ISO    AORTIC VALVE REPAIR/REPLACEMENT N/A 3/27/2025    Procedure: Transfemoral Transcatheter Aortic Valve Replacement;  Surgeon: Carolin Green MD;  Location: [facility] HYBRID OR;  Service: Cardiovascular;  Laterality: N/A;    CARDIAC CATHETERIZATION N/A 12/23/2024    Procedure: Left Heart Cath - Left radial access;  Surgeon: Carolin Green MD;  Location: [facility] CATH INVASIVE LOCATION;  Service: Cardiovascular;  Laterality: N/A;    HAND TENDON SURGERY Right     HYSTERECTOMY      INGUINAL HERNIA REPAIR Left     INNER EAR SURGERY Right     TONSILLECTOMY         Social History     Socioeconomic History    Marital status:     Number of children: 4   Tobacco Use    Smoking status: Never    Smokeless tobacco: Never   Vaping Use    Vaping status: Never Used   Substance and Sexual Activity    Alcohol use: No    Drug use: No    Sexual activity: Defer        Family History   Problem Relation Age of Onset    Hypertension Mother     Sick sinus syndrome Mother     Heart failure Mother     Atrial fibrillation Father     Hyperlipidemia Sister     Hypertension Sister     Hyperlipidemia Sister     Hypertension Sister     Hyperlipidemia Sister     Hypertension Sister     Hyperlipidemia Sister     Hypertension Sister     Valvular heart disease Brother        Objective   Physical Exam:  Vitals:    04/14/25 1432 04/14/25 1433   BP: 160/72 162/78   BP Location: Right arm Left arm   Patient Position: Sitting Sitting   Pulse: 68    Temp: 97.6 °F (36.4 °C)    SpO2: 98%    Weight: 66.2 kg (146 lb)    Height: 162.6 cm (64\")  Comment: patient reports       Body mass index is 25.06 kg/m².    Physical Exam  Vitals and nursing note reviewed.   Constitutional:       General: She is awake.      Appearance: Normal appearance. She is well-developed.   Neck:      Vascular: No carotid bruit.   Cardiovascular:      Rate and Rhythm: Normal rate and regular rhythm.      Pulses: Normal " pulses.           Femoral pulses are 2+ on the right side and 2+ on the left side.     Heart sounds: Normal heart sounds, S1 normal and S2 normal. No murmur heard.  Pulmonary:      Effort: Pulmonary effort is normal.      Breath sounds: Normal breath sounds.   Abdominal:      Palpations: Abdomen is soft.   Musculoskeletal:         General: Normal range of motion.      Right lower leg: No edema.      Left lower leg: No edema.   Skin:     General: Skin is warm and dry.      Capillary Refill: Capillary refill takes less than 2 seconds.      Findings: No bruising.      Comments: Femoral sites: well healing, no surrounding erythema, warmth, drainage, hematoma, or induration   Neurological:      General: No focal deficit present.      Mental Status: She is alert and oriented to person, place, and time. Mental status is at baseline.      GCS: GCS eye subscore is 4. GCS verbal subscore is 5. GCS motor subscore is 6.      Sensory: Sensation is intact.      Motor: Motor function is intact.      Coordination: Coordination is intact.      Gait: Gait is intact.   Psychiatric:         Mood and Affect: Mood normal.         Speech: Speech normal.         Behavior: Behavior normal. Behavior is cooperative.         Cognition and Memory: Cognition normal.         Imaging/Labs:  XR Chest 2 View (04/14/2025 14:50)     Duplex Carotid Ultrasound CAR (02/04/2025 09:52)     Right internal carotid artery demonstrates a 50-69% stenosis.    Left internal carotid artery demonstrates a less than 50% stenosis.    Bilateral vertebral flow is antegrade.    CT Angio TAVR Chest Abdomen Pelvis (01/10/2025 12:50)   1. CTA of the chest, abdomen and pelvis, with image data saved per TAVR protocol.  2. Dense calcification of the aortic valve leaflets. No evidence of thoracic aortic aneurysm or dissection. No evidence of significant aortoiliac luminal stenosis.  3. No other evidence of acute disease in the chest, abdomen or pelvis. Some chronic findings  are present including mild chronic interstitial changes of the lung fields and moderate to large hiatal hernia with portion of the stomach intrathoracic.   Electronically Signed: Harvinder Vega MD     Assessment / Plan      Assessment / Plan:  Diagnoses and all orders for this visit:    1. S/P TAVR (transcatheter aortic valve replacement) (Primary)  s/p AVR with Abbott/25 valve on 3/27/2025 by Dr. Tao.    Postoperative course per HPI  D/C POD #1.  No readmissions  Initial postop eval asymptomatic with no activity intolerance or groin site healing concerns  Exam benign with no groin site complications and 2+ femoral pulses  CXR in clinic without PTX or effusion  Anticipating starting cardiac rehab soon  Follow-up scheduled with lashay Mcclain from post-op and can follow-up on as needed basis    2. Peripheral vascular disease  Previously followed by Dr Cárdenas  Being medically managed  Anticipating annual recall with ABIs 9/2025    Follow Up:   Return in about 5 months (around 9/14/2025) for ABIs.   Or sooner for any further concerns or worsening sign and symptoms. If unable to reach us in the office please dial 911 or go to the nearest emergency department.      JOSEPH Gao  [facility] Cardiothoracic Surgery      Time Spent: I spent 33 minutes caring for Patricia on this date of service. This time includes time spent by me in the following activities: preparing for the visit, reviewing tests, obtaining and/or reviewing a separately obtained history, performing a medically appropriate examination and/or evaluation, counseling and educating the patient/family/caregiver, ordering medications, tests, or procedures, referring and communicating with other health care professionals, documenting information in the medical record, independently interpreting results and communicating that information with the patient/family/caregiver, and care coordination.

## 2025-04-14 ENCOUNTER — OFFICE VISIT (OUTPATIENT)
Dept: CARDIAC SURGERY | Facility: CLINIC | Age: 89
End: 2025-04-14
Payer: MEDICARE

## 2025-04-14 VITALS
WEIGHT: 146 LBS | SYSTOLIC BLOOD PRESSURE: 162 MMHG | OXYGEN SATURATION: 98 % | HEART RATE: 68 BPM | DIASTOLIC BLOOD PRESSURE: 78 MMHG | HEIGHT: 64 IN | TEMPERATURE: 97.6 F | BODY MASS INDEX: 24.92 KG/M2

## 2025-04-14 DIAGNOSIS — Z95.2 S/P TAVR (TRANSCATHETER AORTIC VALVE REPLACEMENT): Primary | ICD-10-CM

## 2025-04-14 DIAGNOSIS — I73.9 PERIPHERAL VASCULAR DISEASE: ICD-10-CM

## 2025-04-14 PROCEDURE — 1160F RVW MEDS BY RX/DR IN RCRD: CPT | Performed by: NURSE PRACTITIONER

## 2025-04-14 PROCEDURE — 99214 OFFICE O/P EST MOD 30 MIN: CPT | Performed by: NURSE PRACTITIONER

## 2025-04-14 PROCEDURE — 1159F MED LIST DOCD IN RCRD: CPT | Performed by: NURSE PRACTITIONER

## 2025-04-15 ENCOUNTER — TELEPHONE (OUTPATIENT)
Dept: CARDIAC SURGERY | Facility: CLINIC | Age: 89
End: 2025-04-15
Payer: MEDICARE

## 2025-04-15 NOTE — TELEPHONE ENCOUNTER
Pt's daughter Jason called to ask what needs to be done for her Mother who was just seen yesterday in office, S/p TAVR- ROM-3/27/25- Pt's daughter stated that her Mother had 2 loose bowel movement that appeared dark in color. I made Cinthia RUIZ aware she suggested that this daughter make an appt with PCP to have stool tested for blood in the stool. I called the daughter back and made her aware to make an appt with PCP Sydney WATOSN

## 2025-04-28 ENCOUNTER — HOSPITAL ENCOUNTER (OUTPATIENT)
Dept: CARDIOLOGY | Facility: HOSPITAL | Age: 89
Discharge: HOME | End: 2025-04-28
Payer: MEDICARE

## 2025-04-28 VITALS
DIASTOLIC BLOOD PRESSURE: 73 MMHG | HEIGHT: 64 IN | SYSTOLIC BLOOD PRESSURE: 173 MMHG | BODY MASS INDEX: 24.92 KG/M2 | WEIGHT: 145.94 LBS

## 2025-04-28 DIAGNOSIS — I35.0 NONRHEUMATIC AORTIC VALVE STENOSIS: ICD-10-CM

## 2025-04-28 DIAGNOSIS — Z95.3 S/P TAVR (TRANSCATHETER AORTIC VALVE REPLACEMENT), BIOPROSTHETIC: ICD-10-CM

## 2025-04-28 LAB
AORTIC DIMENSIONLESS INDEX: 0.81 (DI)
ASCENDING AORTA: 3.3 CM
AV MEAN PRESS GRAD SYS DOP V1V2: 8.9 MMHG
AV VMAX SYS DOP: 198.9 CM/SEC
BH CV ECHO MEAS - AI P1/2T: 457 MSEC
BH CV ECHO MEAS - AO MAX PG: 15.8 MMHG
BH CV ECHO MEAS - AO ROOT DIAM: 3.1 CM
BH CV ECHO MEAS - AO V2 VTI: 47.8 CM
BH CV ECHO MEAS - AVA(I,D): 2.38 CM2
BH CV ECHO MEAS - EDV(CUBED): 39.5 ML
BH CV ECHO MEAS - EDV(MOD-SP2): 39.2 ML
BH CV ECHO MEAS - EDV(MOD-SP4): 55.4 ML
BH CV ECHO MEAS - EF(MOD-SP2): 62.5 %
BH CV ECHO MEAS - EF(MOD-SP4): 63.5 %
BH CV ECHO MEAS - ESV(CUBED): 8.8 ML
BH CV ECHO MEAS - ESV(MOD-SP2): 14.7 ML
BH CV ECHO MEAS - ESV(MOD-SP4): 20.2 ML
BH CV ECHO MEAS - FS: 39.4 %
BH CV ECHO MEAS - IVS/LVPW: 1.03 CM
BH CV ECHO MEAS - IVSD: 1.33 CM
BH CV ECHO MEAS - LA DIMENSION: 4.4 CM
BH CV ECHO MEAS - LAT PEAK E' VEL: 5.3 CM/SEC
BH CV ECHO MEAS - LV DIASTOLIC VOL/BSA (35-75): 32.4 CM2
BH CV ECHO MEAS - LV MASS(C)D: 149 GRAMS
BH CV ECHO MEAS - LV MAX PG: 7.6 MMHG
BH CV ECHO MEAS - LV MEAN PG: 4.6 MMHG
BH CV ECHO MEAS - LV SYSTOLIC VOL/BSA (12-30): 11.8 CM2
BH CV ECHO MEAS - LV V1 MAX: 138.3 CM/SEC
BH CV ECHO MEAS - LV V1 VTI: 38.6 CM
BH CV ECHO MEAS - LVIDD: 3.4 CM
BH CV ECHO MEAS - LVIDS: 2.07 CM
BH CV ECHO MEAS - LVOT AREA: 2.9 CM2
BH CV ECHO MEAS - LVOT DIAM: 1.94 CM
BH CV ECHO MEAS - LVPWD: 1.29 CM
BH CV ECHO MEAS - MED PEAK E' VEL: 4 CM/SEC
BH CV ECHO MEAS - MR MAX PG: 137.5 MMHG
BH CV ECHO MEAS - MR MAX VEL: 586.4 CM/SEC
BH CV ECHO MEAS - MR MEAN PG: 100.2 MMHG
BH CV ECHO MEAS - MR MEAN VEL: 488.3 CM/SEC
BH CV ECHO MEAS - MR VTI: 235.5 CM
BH CV ECHO MEAS - MV A MAX VEL: 126.5 CM/SEC
BH CV ECHO MEAS - MV DEC SLOPE: 545.8 CM/SEC2
BH CV ECHO MEAS - MV DEC TIME: 0.24 SEC
BH CV ECHO MEAS - MV E MAX VEL: 142 CM/SEC
BH CV ECHO MEAS - MV E/A: 1.12
BH CV ECHO MEAS - MV MAX PG: 9.2 MMHG
BH CV ECHO MEAS - MV MEAN PG: 5.2 MMHG
BH CV ECHO MEAS - MV P1/2T: 76.3 MSEC
BH CV ECHO MEAS - MV V2 VTI: 42.1 CM
BH CV ECHO MEAS - MVA(P1/2T): 2.9 CM2
BH CV ECHO MEAS - MVA(VTI): 2.7 CM2
BH CV ECHO MEAS - PA ACC TIME: 0.08 SEC
BH CV ECHO MEAS - PI END-D VEL: 103.2 CM/SEC
BH CV ECHO MEAS - RAP SYSTOLE: 3 MMHG
BH CV ECHO MEAS - RVSP: 31 MMHG
BH CV ECHO MEAS - SV(LVOT): 113.7 ML
BH CV ECHO MEAS - SV(MOD-SP2): 24.5 ML
BH CV ECHO MEAS - SV(MOD-SP4): 35.2 ML
BH CV ECHO MEAS - SVI(LVOT): 66.4 ML/M2
BH CV ECHO MEAS - SVI(MOD-SP2): 14.3 ML/M2
BH CV ECHO MEAS - SVI(MOD-SP4): 20.6 ML/M2
BH CV ECHO MEAS - TAPSE (>1.6): 2 CM
BH CV ECHO MEAS - TR MAX PG: 28.4 MMHG
BH CV ECHO MEAS - TR MAX VEL: 266.6 CM/SEC
BH CV ECHO MEASUREMENTS AVERAGE E/E' RATIO: 30.54
BH CV XLRA - RV BASE: 3.4 CM
BH CV XLRA - RV LENGTH: 6.7 CM
BH CV XLRA - RV MID: 2.5 CM
BH CV XLRA - TDI S': 10.7 CM/SEC
IVRT: 109 MS
LEFT ATRIUM VOLUME INDEX: 69.6 ML/M2
LV EF 2D ECHO EST: 60 %
LV EF BIPLANE MOD: 61.6 %

## 2025-04-28 PROCEDURE — 93306 TTE W/DOPPLER COMPLETE: CPT

## 2025-04-28 PROCEDURE — 93306 TTE W/DOPPLER COMPLETE: CPT | Performed by: INTERNAL MEDICINE

## 2025-04-30 ENCOUNTER — TELEPHONE (OUTPATIENT)
Dept: CARDIOLOGY | Facility: CLINIC | Age: 89
End: 2025-04-30
Payer: MEDICARE

## 2025-04-30 NOTE — TELEPHONE ENCOUNTER
Received call from pt daughter who states pt had TAVR 3/27 and a presyncopal episode at cardiac rehab today. [facility]. Staff stated that pt came in this morning for her 2nd visit and did not make it out of the lobby. When she arrived she was dizzy, sweaty, weak, and about to faint. /74, HR 70, O2 sat 95%, FSBS 110. Denied CP, SOB. Nurse had pt stand and /63, HR 70-80 max. Pt had repeat of presyncopal s/s on standing as well as nausea. Pt went to ER and per daughter's report currently her /90, . Pt has c/o left-sided upper back pain. Pt believes pain from 1st rehab visit Friday, but staff state only had 5 min session with 2 # weights on arms and legs. Pt did yard work yesterday. Daughter states pt has been eating well, and drinks recommended amt of fluids.   Daughter called to make Dr. Green aware and to get f/u appt moved up. Advised daughter I will make Dr. Green aware. Will request ER record when available.

## 2025-05-02 NOTE — TELEPHONE ENCOUNTER
Spoke with pt and her sister, Julieta. Stated she went to the ER and BP and HR were elevated per previous note and she was diagnosed with a beginning UTI. Stayed 5 hours, and was D/c'd home. Will request that record. Sister reports a similar episode to this about a week after TAVR surgery when pt got up with walker to go to the bathroom and had presyncope s/s. Rested in seated position for about 20 minutes then got into bed, napped, and woke up feeling great.   Sister also states at times her BP drops low. For example, had a regular check of BP and 80/40 and pt asymptomatic. Sister reports pt was at cardiac rehab today and did well without issues. Peak BP 170s with exercise. Reviewed Dr. Green's instructions/advice with sister. Advised okay to continue with rehab because she is being monitored while there and she tolerated it well today. Will make Dr. Green aware of the same. Scheduled again for Monday, then see Dr. Green on Tuesday. She voiced understanding.

## 2025-05-06 ENCOUNTER — OFFICE VISIT (OUTPATIENT)
Age: 89
End: 2025-05-06
Payer: MEDICARE

## 2025-05-06 VITALS
SYSTOLIC BLOOD PRESSURE: 156 MMHG | OXYGEN SATURATION: 95 % | HEART RATE: 75 BPM | HEIGHT: 64 IN | WEIGHT: 142 LBS | BODY MASS INDEX: 24.24 KG/M2 | DIASTOLIC BLOOD PRESSURE: 64 MMHG

## 2025-05-06 DIAGNOSIS — I10 ESSENTIAL HYPERTENSION: ICD-10-CM

## 2025-05-06 DIAGNOSIS — E78.5 DYSLIPIDEMIA: ICD-10-CM

## 2025-05-06 DIAGNOSIS — I35.0 NONRHEUMATIC AORTIC VALVE STENOSIS: Primary | ICD-10-CM

## 2025-05-06 DIAGNOSIS — I65.21 CAROTID ARTERY STENOSIS, SYMPTOMATIC, RIGHT: ICD-10-CM

## 2025-05-06 DIAGNOSIS — I73.9 PERIPHERAL VASCULAR DISEASE: ICD-10-CM

## 2025-05-06 RX ORDER — CEPHALEXIN 500 MG/1
1 CAPSULE ORAL 3 TIMES DAILY
COMMUNITY
Start: 2025-04-30

## 2025-05-06 NOTE — PROGRESS NOTES
[facility] Cardiology    Encounter Date: 2025    Patient ID: Patricia Redmond is a 89 y.o. female.  : 1935     PCP: Emmett Martines MD       Chief Complaint: Nonrheumatic aortic valve stenosis, Leg Swelling, and Shortness of Breath      PROBLEM LIST:  Peripheral Artery Disease:  Claudication.  CTA lower extremities with contrast, 2018:   Right: Right SFA is occluded. Popliteal is reconstituted by collaterals and then occluded distally. Peroneal trunk is occluded. Anterior and posterior tibial are occluded and then reconstituted by collaterals.  Left: Left SFA is occluded. Popliteal is occluded. Common peroneal trunk and anterior tibial artery reconstituted by collaterals.  VENUS, 2021: Left 0.78 and right 0.84.  VENUS, 2024: Right VENUS moderately reduced. Left VENUS normal.   Aortic valve stenosis:  Echo, 2018: EF 56-60%. Left atrial cavity size is mildly dilated. LV diastolic dysfunction (grade I). Moderate AS, mean gradient 21 mmHg. Mild MR/TR. RVSP within normal limits.  Echo, 2019: EF 65%. Mild concentric LVH. Impaired relaxation. Moderate AS, CARLTON 1.2 cm2, mean gradient 26 mmHg. Mild MR/TR, normal RVSP.  Echo, 2020: EF 60%  Moderate AS, mean gradient 20 mmHg. Mild MR/TR.  Echo, 10/15/2021: EF 65%. Moderate AS, mean gradient 26.8 and CARLTON 1.1 cm². Moderate MR. Mild TR with normal RVSP. Mild LVH.   Holter, 10/15/2021: Sinus rhythm. Occasional PACs, rare PVCs, and no pauses. Occasional short SVT/PAT. No symptoms.   Echocardiogram 2022: EF 66-70%, moderate aortic stenosis with CARLTON 1.25 cm², mean gradient 29 mmHg, max gradient 52 mmHg  Echo, 2023: EF 65%. Moderate aortic valve stenosis is present.  Mean gradient 34 mmHg, CARLTON 1.25 cm². Mild MR/TR with elevated RVSP.  Echo, 2024: EF 51-55%. LV wall thickness consistent with mild concentric hypertrophy. LA volume is moderately increased, Severe AV stenosis with mean gradient 47  mmHg. Mild aortic insufficiency. Mild MR and TR with normal RVSP.   LHC, 12/23/2024: No evidence of hemodynamic significant coronary artery disease.   DORIS, 12/23/2024: EF 60%. Biatrial enlargement. Severe AS. Peak velocity 414 cm/s, mean gradient 40 mmHg. Aortic annulus 2.4 cm, STJ 2.9 cm. Mild MR. Trace TR. Mobile echogenic structure is noted in the left ventricular outflow track which was also seen in post TAVR DORIS. This is most likely consistent with a chordal structure or an intimal flap and does not appear hemodynamically significant.   S/p TAVR, 03/27/2025: There is a 25 mm TAVR valve present.   Echo, 03/28/2025: EF>70%. LV wall thickness is consistent with mild concentric hypertrophy. The left atrial cavity is mild to moderately dilated. Mean gradient 9 mmHg, there is trace aortic insufficiency. Mild MR.   Echo, 04/28/2025: EF 60%. MCH.  Mean gradient 9 mmHg, CARLTON 2.4 cm². Mild aortic regurgitation is noted. Previously noted mobile echogenic structure in the LV OT is again visualized and appears less mobile. This was felt to be related to intimal flap and is not physiologically significant. Mild MR/TR/NH.   Carotid artery stenosis  Carotid duplex, 02/04/2025: Right internal carotid artery demonstrates a 50-69% stenosis. Left internal carotid artery demonstrates a less than 50% stenosis.  Dizziness:   24h Holter, 03/28/2019: Symptoms of SOA occured with sinus rhythm. Short runs of SVT 4-6 beats.  Hypertension.  Venous duplex, 05/03/2023: Chronic right lower extremity deep vein thrombosis noted in the gastrocnemius. Chronic right lower extremity superficial thrombophlebitis noted in the small saphenous. Chronic left lower extremity deep vein thrombosis noted in the gastrocnemius. Sub-acute left lower extremity deep vein thrombosis noted in the distal femoral. There was deep venous valvular incompetence noted in the right popliteal.  Dyslipidemia.  DVT x 2 (circa 2002).  On chronic warfarin  therapy.  TIA.  Macular degeneration.  Pseudoxanthoma elasticum.  Hypothyroidism.  Osteoporosis.    History of Present Illness  Patient presents today for a follow-up with a history of nonrheumatic aortic valve stenosis, status post TAVR, peripheral vascular disease, and cardiac risk factors. Since last visit, patient underwent a TAVR procedure.  Since hospital discharge she had an episode where she felt poorly with near syncope. At this time, she had a blood pressure of 88 mmHg systolic and a pulse of 90 bpm. She went to the local ER and was told she was dehydrated at this time and she had a UTI. Patient has had no recurrent episodes. However, she does experience occasional shortness of breath but overall feeling better. She also reports improvement of her claudication symptoms with the use of Pletal.  The patient denies current chest pain, orthopnea, palpitations, edema, dizziness, and syncope.     No Known Allergies      Current Outpatient Medications:     acetaminophen (TYLENOL) 325 MG tablet, Take 2 tablets by mouth Every 6 (Six) Hours As Needed for Mild Pain., Disp: 90 tablet, Rfl: 0    aspirin 81 MG EC tablet, Take 1 tablet by mouth Daily., Disp: 30 tablet, Rfl: 0    atorvastatin (LIPITOR) 20 MG tablet, Take 1 tablet by mouth Every Night., Disp: , Rfl: 3    calcium carbonate-vitamin d 600-400 MG-UNIT per tablet, Take 1 tablet by mouth 2 (Two) Times a Day., Disp: , Rfl:     carvedilol (COREG) 25 MG tablet, Take 1 tablet by mouth 2 (Two) Times a Day. (Patient taking differently: Take 0.5 tablets by mouth Daily. 1/2 tab at NOON, AND 1 whole tab at QHS), Disp: 180 tablet, Rfl: 3    carvedilol (COREG) 25 MG tablet, Take 1 tablet by mouth Every Night., Disp: , Rfl:     cephalexin (KEFLEX) 500 MG capsule, Take 1 capsule by mouth 3 times a day., Disp: , Rfl:     cilostazol (PLETAL) 50 MG tablet, Take 1 tablet by mouth 2 (Two) Times a Day., Disp: , Rfl:     estradiol (ESTRACE) 0.1 MG/GM vaginal cream, Insert 2 g into  "the vagina 3 (Three) Times a Week., Disp: , Rfl:     ezetimibe (ZETIA) 10 MG tablet, Take 1 tablet by mouth Daily., Disp: , Rfl: 2    ferrous sulfate 325 (65 FE) MG tablet, Take 1 tablet by mouth Daily. 1 tablet Daily, Disp: , Rfl:     lansoprazole (PREVACID) 30 MG capsule, Take 1 capsule by mouth Every Evening., Disp: , Rfl:     levothyroxine (SYNTHROID, LEVOTHROID) 75 MCG tablet, Take 1 tablet by mouth Take As Directed. 75mcg daily except ON SUNDAY, TAKES 1.5 TABS, Disp: , Rfl: 3    losartan-hydrochlorothiazide (HYZAAR) 50-12.5 MG per tablet, Take 1 tablet by mouth Daily., Disp: , Rfl:     Multiple Vitamin (MULTI-VITAMIN DAILY PO), Take 1 tablet by mouth Daily., Disp: , Rfl:     Multiple Vitamins-Minerals (OCUVITE ADULT FORMULA PO), Take 1 tablet by mouth 2 (Two) Times a Day., Disp: , Rfl:     vitamin B-12 (CYANOCOBALAMIN) 1000 MCG tablet, Take 2 tablets by mouth Daily., Disp: , Rfl:     Vitamin D, Cholecalciferol, 1000 units capsule, Take 2 capsules by mouth Daily., Disp: , Rfl:     vitamin E 400 UNIT capsule, Take 1 capsule by mouth Daily., Disp: , Rfl:     warfarin (COUMADIN) 5 MG tablet, Take 1 tablet by mouth Daily., Disp: , Rfl:   No current facility-administered medications for this visit.    Facility-Administered Medications Ordered in Other Visits:     Chlorhexidine Gluconate Cloth 2 % pads 1 Application, 1 Application, Topical, Q12H PRN, Liz Almaraz PA-C    The following portions of the patient's history were reviewed and updated as appropriate: allergies, current medications, past family history, past medical history, past social history, past surgical history and problem list.    ROS  Review of Systems   14 point ROS negative except for that listed in the HPI.         Objective:     /64 (BP Location: Left arm, Patient Position: Sitting)   Pulse 75   Ht 162.6 cm (64\")   Wt 64.4 kg (142 lb)   SpO2 95%   BMI 24.37 kg/m²      Physical Exam  Constitutional: Patient appears " well-developed and well-nourished.   HENT: HEENT exam unremarkable.   Neck: Neck supple. No JVD present. No carotid bruits.   Cardiovascular: Normal rate, regular rhythm and normal heart sounds. Faint systolic murmur.   2+ symmetric pulses.   Pulmonary/Chest: Breath sounds normal. Does not exhibit tenderness.   Abdominal: Abdomen benign.   Musculoskeletal: 1 + pitting edema   Neurological: Neurological exam unremarkable.   Vitals reviewed.    Data Review:   Lab Results   Component Value Date    GLUCOSE 109 (H) 03/28/2025    BUN 18 03/28/2025    CREATININE 0.56 (L) 03/28/2025    EGFR 87.9 03/28/2025    BCR 32.1 (H) 03/28/2025     03/28/2025    K 4.4 03/28/2025    K 4.4 03/28/2025    CO2 22.0 03/28/2025    CALCIUM 8.1 (L) 03/28/2025    ALBUMIN 3.8 03/26/2025    AST 26 03/26/2025    ALT 21 03/26/2025     Lab Results   Component Value Date    CHOL 119 12/23/2024    TRIG 46 12/23/2024    HDL 66 (H) 12/23/2024    LDL 42 12/23/2024      Lab Results   Component Value Date    WBC 10.22 03/28/2025    RBC 3.45 (L) 03/28/2025    HGB 10.6 (L) 03/28/2025    HCT 32.5 (L) 03/28/2025    MCV 94.2 03/28/2025     (L) 03/28/2025     Lab Results   Component Value Date    HGBA1C 5.20 03/26/2025        Procedures     Advance Care Planning   ACP discussion was held with the patient during this visit. Patient does not have an advance directive, declines further assistance.           Assessment:      Diagnosis Plan   1. Nonrheumatic aortic valve stenosis s/p TAVR  Stable, continue to monitor clinically with future echocardiographic follow-up       2. Peripheral vascular disease  Deescalate blood thinner therapy by discontinuing aspirin 81 mg daily. Continue on Pletal 50 mg BID for improved circulation and warfarin for history of DVT.       3. Carotid artery stenosis, symptomatic, right  Carotid duplex on 02/04/2025 showed RADHA demonstrates a 50-69% stenosis.  Currently stable and asymptomatic, will repeat carotid ultrasound in 1  year.  Continue lipid management.      4. Essential hypertension  Patient brought blood pressure log in visit which shows mostly normal measurements. Continue on carvedilol as directed and Hyzaar 50-12.5 mg daily for hypertension.      5. Dyslipidemia  LDL 42 on 12/23/2024. Continue on atorvastatin 20 mg nightly and Zetia 10 mg daily for dyslipidemia.         Plan:   Stable cardiac status.  No current angina or CHF symptoms, no recurrence of near syncope or syncope  Deescalate blood thinner therapy by discontinuing aspirin 81 mg daily as she is also on warfarin  Continue rest of the current medications.   FU in 6 MO, sooner as needed.  Thank you for allowing us to participate in the care of your patient.     Scribed for Carolin Green MD by Zainab Michael. 5/6/2025 11:46 EDT    I, Carolin Green MD, personally performed the services described in this documentation as scribed by the above named individual in my presence, and it is both accurate and complete.  5/8/2025  06:36 EDT      Please note that portions of this note may have been completed with a voice recognition program. Efforts were made to edit the dictations, but occasionally words are mistranscribed.

## 2025-05-19 ENCOUNTER — TELEPHONE (OUTPATIENT)
Dept: CARDIOLOGY | Facility: CLINIC | Age: 89
End: 2025-05-19
Payer: MEDICARE

## 2025-05-19 ENCOUNTER — DOCUMENTATION (OUTPATIENT)
Dept: CARDIOLOGY | Facility: HOSPITAL | Age: 89
End: 2025-05-19
Payer: MEDICARE

## 2025-05-19 NOTE — TELEPHONE ENCOUNTER
Spoke with pt daughter Friday, May 16, late afternoon.  pt is currently inpatient at [facility] due to a fall and lower back compression fracture. Daughter reports that the fall was preceeded by lightheadedness, dizziness, and presyncopal s/s.  pt has had 3-4 episodes like this since TAVR 3/27 (did not fall with the others). Reports she is staying hydrated and taking her meds as prescribed. Last seen in office 5/6. She is wondering if the episodes are related to her heart?

## 2025-05-21 NOTE — TELEPHONE ENCOUNTER
Spoke with pt daughter, Jason. States pt was discharged from hospital yesterday. States she had orthostatic Bps done in hospital and noted when going from sitting to standing pt BP dropped by 23 points.   Faxed request for records to [facility].

## 2025-06-02 DIAGNOSIS — R55 SYNCOPE AND COLLAPSE: Primary | ICD-10-CM

## 2025-06-03 DIAGNOSIS — R55 SYNCOPE AND COLLAPSE: Primary | ICD-10-CM

## 2025-06-11 ENCOUNTER — DOCUMENTATION (OUTPATIENT)
Dept: CARDIOLOGY | Facility: HOSPITAL | Age: 89
End: 2025-06-11
Payer: MEDICARE

## 2025-06-11 NOTE — PROGRESS NOTES
Call received from daughter, Jason Mo with questions about TAVR valve. Discussed bioprosthetic valve with bovine tissue and valve is MRI compatible. She has my contact information for any future questions or concerns.

## 2025-06-13 ENCOUNTER — NURSE TRIAGE (OUTPATIENT)
Dept: CALL CENTER | Facility: HOSPITAL | Age: 89
End: 2025-06-13
Payer: MEDICARE

## 2025-06-13 ENCOUNTER — HOSPITAL ENCOUNTER (EMERGENCY)
Facility: HOSPITAL | Age: 89
Discharge: HOME | End: 2025-06-13
Attending: EMERGENCY MEDICINE
Payer: MEDICARE

## 2025-06-13 ENCOUNTER — APPOINTMENT (OUTPATIENT)
Dept: CT IMAGING | Facility: HOSPITAL | Age: 89
End: 2025-06-13
Payer: MEDICARE

## 2025-06-13 ENCOUNTER — TELEPHONE (OUTPATIENT)
Dept: CARDIOLOGY | Facility: CLINIC | Age: 89
End: 2025-06-13

## 2025-06-13 VITALS
HEIGHT: 64 IN | DIASTOLIC BLOOD PRESSURE: 65 MMHG | HEART RATE: 85 BPM | OXYGEN SATURATION: 91 % | WEIGHT: 142 LBS | BODY MASS INDEX: 24.24 KG/M2 | SYSTOLIC BLOOD PRESSURE: 127 MMHG | RESPIRATION RATE: 18 BRPM | TEMPERATURE: 98.6 F

## 2025-06-13 DIAGNOSIS — R41.0 DELIRIUM: Primary | ICD-10-CM

## 2025-06-13 DIAGNOSIS — I10 EPISODE OF HYPERTENSION: ICD-10-CM

## 2025-06-13 LAB
ALBUMIN SERPL-MCNC: 3.6 G/DL (ref 3.5–5.2)
ALBUMIN/GLOB SERPL: 1.4 G/DL
ALP SERPL-CCNC: 103 U/L (ref 39–117)
ALT SERPL W P-5'-P-CCNC: 35 U/L (ref 1–33)
ANION GAP SERPL CALCULATED.3IONS-SCNC: 11 MMOL/L (ref 5–15)
AST SERPL-CCNC: 39 U/L (ref 1–32)
BASOPHILS # BLD AUTO: 0.03 10*3/MM3 (ref 0–0.2)
BASOPHILS NFR BLD AUTO: 0.4 % (ref 0–1.5)
BILIRUB SERPL-MCNC: 0.5 MG/DL (ref 0–1.2)
BILIRUB UR QL STRIP: NEGATIVE
BUN SERPL-MCNC: 12.2 MG/DL (ref 8–23)
BUN/CREAT SERPL: 20.3 (ref 7–25)
CALCIUM SPEC-SCNC: 9.4 MG/DL (ref 8.6–10.5)
CHLORIDE SERPL-SCNC: 96 MMOL/L (ref 98–107)
CLARITY UR: CLEAR
CO2 SERPL-SCNC: 26 MMOL/L (ref 22–29)
COLOR UR: YELLOW
CREAT SERPL-MCNC: 0.6 MG/DL (ref 0.57–1)
D-LACTATE SERPL-SCNC: 1.1 MMOL/L (ref 0.5–2)
DEPRECATED RDW RBC AUTO: 49.8 FL (ref 37–54)
EGFRCR SERPLBLD CKD-EPI 2021: 85.9 ML/MIN/1.73
EOSINOPHIL # BLD AUTO: 0.05 10*3/MM3 (ref 0–0.4)
EOSINOPHIL NFR BLD AUTO: 0.6 % (ref 0.3–6.2)
ERYTHROCYTE [DISTWIDTH] IN BLOOD BY AUTOMATED COUNT: 14.8 % (ref 12.3–15.4)
GEN 5 1HR TROPONIN T REFLEX: 18 NG/L
GLOBULIN UR ELPH-MCNC: 2.6 GM/DL
GLUCOSE SERPL-MCNC: 115 MG/DL (ref 65–99)
GLUCOSE UR STRIP-MCNC: NEGATIVE MG/DL
HCT VFR BLD AUTO: 34.7 % (ref 34–46.6)
HGB BLD-MCNC: 11.6 G/DL (ref 12–15.9)
HGB UR QL STRIP.AUTO: NEGATIVE
IMM GRANULOCYTES # BLD AUTO: 0.01 10*3/MM3 (ref 0–0.05)
IMM GRANULOCYTES NFR BLD AUTO: 0.1 % (ref 0–0.5)
INR PPP: 1.67 (ref 0.89–1.12)
KETONES UR QL STRIP: NEGATIVE
LEUKOCYTE ESTERASE UR QL STRIP.AUTO: NEGATIVE
LIPASE SERPL-CCNC: 46 U/L (ref 13–60)
LYMPHOCYTES # BLD AUTO: 3.74 10*3/MM3 (ref 0.7–3.1)
LYMPHOCYTES NFR BLD AUTO: 44.5 % (ref 19.6–45.3)
MCH RBC QN AUTO: 30.4 PG (ref 26.6–33)
MCHC RBC AUTO-ENTMCNC: 33.4 G/DL (ref 31.5–35.7)
MCV RBC AUTO: 91.1 FL (ref 79–97)
MONOCYTES # BLD AUTO: 0.21 10*3/MM3 (ref 0.1–0.9)
MONOCYTES NFR BLD AUTO: 2.5 % (ref 5–12)
NEUTROPHILS NFR BLD AUTO: 4.37 10*3/MM3 (ref 1.7–7)
NEUTROPHILS NFR BLD AUTO: 51.9 % (ref 42.7–76)
NITRITE UR QL STRIP: NEGATIVE
NRBC BLD AUTO-RTO: 0 /100 WBC (ref 0–0.2)
PH UR STRIP.AUTO: 7 [PH] (ref 5–8)
PLATELET # BLD AUTO: 129 10*3/MM3 (ref 140–450)
PMV BLD AUTO: 9.6 FL (ref 6–12)
POTASSIUM SERPL-SCNC: 4 MMOL/L (ref 3.5–5.2)
PROT SERPL-MCNC: 6.2 G/DL (ref 6–8.5)
PROT UR QL STRIP: NEGATIVE
PROTHROMBIN TIME: 20.8 SECONDS (ref 12.2–15.3)
QT INTERVAL: 366 MS
QTC INTERVAL: 455 MS
RBC # BLD AUTO: 3.81 10*6/MM3 (ref 3.77–5.28)
SODIUM SERPL-SCNC: 133 MMOL/L (ref 136–145)
SP GR UR STRIP: 1.01 (ref 1–1.03)
TROPONIN T % DELTA: -10
TROPONIN T NUMERIC DELTA: -2 NG/L
TROPONIN T SERPL HS-MCNC: 20 NG/L
UROBILINOGEN UR QL STRIP: NORMAL
WBC NRBC COR # BLD AUTO: 8.41 10*3/MM3 (ref 3.4–10.8)

## 2025-06-13 PROCEDURE — 84484 ASSAY OF TROPONIN QUANT: CPT | Performed by: EMERGENCY MEDICINE

## 2025-06-13 PROCEDURE — 83690 ASSAY OF LIPASE: CPT | Performed by: EMERGENCY MEDICINE

## 2025-06-13 PROCEDURE — 99285 EMERGENCY DEPT VISIT HI MDM: CPT

## 2025-06-13 PROCEDURE — 70450 CT HEAD/BRAIN W/O DYE: CPT

## 2025-06-13 PROCEDURE — 25810000003 SODIUM CHLORIDE 0.9 % SOLUTION: Performed by: EMERGENCY MEDICINE

## 2025-06-13 PROCEDURE — 81003 URINALYSIS AUTO W/O SCOPE: CPT | Performed by: EMERGENCY MEDICINE

## 2025-06-13 PROCEDURE — 83605 ASSAY OF LACTIC ACID: CPT | Performed by: EMERGENCY MEDICINE

## 2025-06-13 PROCEDURE — 36415 COLL VENOUS BLD VENIPUNCTURE: CPT

## 2025-06-13 PROCEDURE — 80053 COMPREHEN METABOLIC PANEL: CPT | Performed by: EMERGENCY MEDICINE

## 2025-06-13 PROCEDURE — 85025 COMPLETE CBC W/AUTO DIFF WBC: CPT | Performed by: EMERGENCY MEDICINE

## 2025-06-13 PROCEDURE — 93005 ELECTROCARDIOGRAM TRACING: CPT | Performed by: EMERGENCY MEDICINE

## 2025-06-13 PROCEDURE — 85610 PROTHROMBIN TIME: CPT | Performed by: EMERGENCY MEDICINE

## 2025-06-13 RX ORDER — OXYCODONE HYDROCHLORIDE 5 MG/1
5 TABLET ORAL ONCE
Refills: 0 | Status: COMPLETED | OUTPATIENT
Start: 2025-06-13 | End: 2025-06-13

## 2025-06-13 RX ORDER — HYDROCHLOROTHIAZIDE 12.5 MG/1
12.5 TABLET ORAL
Status: DISCONTINUED | OUTPATIENT
Start: 2025-06-13 | End: 2025-06-13 | Stop reason: HOSPADM

## 2025-06-13 RX ORDER — SODIUM CHLORIDE 0.9 % (FLUSH) 0.9 %
10 SYRINGE (ML) INJECTION AS NEEDED
Status: DISCONTINUED | OUTPATIENT
Start: 2025-06-13 | End: 2025-06-13 | Stop reason: HOSPADM

## 2025-06-13 RX ORDER — SODIUM CHLORIDE 0.9 % (FLUSH) 0.9 %
10 SYRINGE (ML) INJECTION AS NEEDED
Status: DISCONTINUED | OUTPATIENT
Start: 2025-06-13 | End: 2025-06-13

## 2025-06-13 RX ORDER — LOSARTAN POTASSIUM 50 MG/1
50 TABLET ORAL
Status: DISCONTINUED | OUTPATIENT
Start: 2025-06-13 | End: 2025-06-13 | Stop reason: HOSPADM

## 2025-06-13 RX ORDER — ACETAMINOPHEN 500 MG
1000 TABLET ORAL ONCE
Status: COMPLETED | OUTPATIENT
Start: 2025-06-13 | End: 2025-06-13

## 2025-06-13 RX ADMIN — SODIUM CHLORIDE 500 ML: 9 INJECTION, SOLUTION INTRAVENOUS at 04:30

## 2025-06-13 RX ADMIN — OXYCODONE 5 MG: 5 TABLET ORAL at 04:41

## 2025-06-13 RX ADMIN — ACETAMINOPHEN 1000 MG: 500 TABLET ORAL at 04:41

## 2025-06-13 NOTE — ED PROVIDER NOTES
[facility] [facility]    EMERGENCY DEPARTMENT ENCOUNTER      Pt Name: Patricia Redmond  MRN: [id number]  YOB: 1935  Date of evaluation: 6/13/2025  Provider: Grover Brown MD    CHIEF COMPLAINT       Chief Complaint   Patient presents with    Altered Mental Status         HISTORY OF PRESENT ILLNESS   Patricia Redmond is a 89 y.o. female who presents to the emergency department for evaluation of altered mental state.  History is mostly provided by the patient's family members at the bedside but the patient can discussed her symptoms but not provide a full history of events.  Family notes that patient's recent medical history is notable for a TAVR procedure done last month, she suffered a fall after that and a compression fracture in her spine.  She is recently over the past few days been diagnosed with a urinary tract infection and treated with antibiotics.  Tonight he was acting like her normal self at 9 PM or 9:45 PM but then shortly after that she seemed to get confused, could not answer straightforward questions about things she would normally know the answer to like orientation questions and so she was brought to the hospital for further evaluation.    I asked the patient how she is feeling she states she feels generally unwell but has trouble describing what she is feeling.  She states she does have pain in her back.    REVIEW OF SYSTEMS     ROS:  A chief complaint appropriate review of systems was completed and is negative except as noted in the HPI.      PAST MEDICAL HISTORY     Past Medical History:   Diagnosis Date    Anesthesia complication     slow to awaken    Aortic valve stenosis     Deep vein thrombosis     warfarin    Degenerative disc disease, lumbar     Dyslipidemia     GERD (gastroesophageal reflux disease)     Hypertension     Hypothyroidism     Inguinal hernia     Macular degeneration     Osteoporosis     Peripheral vascular disease     PONV (postoperative nausea and vomiting)     Pseudoxanthoma  elasticum     TIA (transient ischemic attack)          SURGICAL HISTORY       Past Surgical History:   Procedure Laterality Date    AORTIC VALVE REPAIR/REPLACEMENT N/A 3/27/2025    Procedure: TRANSCATHETER AORTIC VALVE REPLACEMENT / DORIS;  Surgeon: Boni Tao MD;  Location:  [facility];  Service: Cardiothoracic;  Laterality: N/A;  FL-17 MIN 36SEC  DOSE-327 MGY   CONTRAST-45ML ISO    AORTIC VALVE REPAIR/REPLACEMENT N/A 3/27/2025    Procedure: Transfemoral Transcatheter Aortic Valve Replacement;  Surgeon: Carolin Green MD;  Location:  [facility];  Service: Cardiovascular;  Laterality: N/A;    CARDIAC CATHETERIZATION N/A 12/23/2024    Procedure: Left Heart Cath - Left radial access;  Surgeon: Carolin Green MD;  Location:  [facility]ATION;  Service: Cardiovascular;  Laterality: N/A;    HAND TENDON SURGERY Right     HYSTERECTOMY      INGUINAL HERNIA REPAIR Left     INNER EAR SURGERY Right     TONSILLECTOMY           CURRENT MEDICATIONS       Current Facility-Administered Medications:     losartan (COZAAR) tablet 50 mg, 50 mg, Oral, Q24H **AND** hydroCHLOROthiazide tablet 12.5 mg, 12.5 mg, Oral, Q24H, Grover Brown MD    [COMPLETED] Insert Peripheral IV, , , Once **AND** sodium chloride 0.9 % flush 10 mL, 10 mL, Intravenous, PRN, Grover Brown MD    Current Outpatient Medications:     acetaminophen (TYLENOL) 325 MG tablet, Take 2 tablets by mouth Every 6 (Six) Hours As Needed for Mild Pain., Disp: 90 tablet, Rfl: 0    atorvastatin (LIPITOR) 20 MG tablet, Take 1 tablet by mouth Every Night., Disp: , Rfl: 3    calcium carbonate-vitamin d 600-400 MG-UNIT per tablet, Take 1 tablet by mouth 2 (Two) Times a Day., Disp: , Rfl:     carvedilol (COREG) 25 MG tablet, Take 1 tablet by mouth 2 (Two) Times a Day. (Patient taking differently: Take 0.5 tablets by mouth Daily. 1/2 tab at NOON, AND 1 whole tab at QHS), Disp: 180 tablet, Rfl: 3    carvedilol (COREG) 25 MG tablet, Take 1 tablet by mouth Every  Night., Disp: , Rfl:     cephalexin (KEFLEX) 500 MG capsule, Take 1 capsule by mouth 3 times a day., Disp: , Rfl:     cilostazol (PLETAL) 50 MG tablet, Take 1 tablet by mouth 2 (Two) Times a Day., Disp: , Rfl:     estradiol (ESTRACE) 0.1 MG/GM vaginal cream, Insert 2 g into the vagina 3 (Three) Times a Week., Disp: , Rfl:     ezetimibe (ZETIA) 10 MG tablet, Take 1 tablet by mouth Daily., Disp: , Rfl: 2    ferrous sulfate 325 (65 FE) MG tablet, Take 1 tablet by mouth Daily. 1 tablet Daily, Disp: , Rfl:     lansoprazole (PREVACID) 30 MG capsule, Take 1 capsule by mouth Every Evening., Disp: , Rfl:     levothyroxine (SYNTHROID, LEVOTHROID) 75 MCG tablet, Take 1 tablet by mouth Take As Directed. 75mcg daily except ON SUNDAY, TAKES 1.5 TABS, Disp: , Rfl: 3    losartan-hydrochlorothiazide (HYZAAR) 50-12.5 MG per tablet, Take 1 tablet by mouth Daily., Disp: , Rfl:     Multiple Vitamin (MULTI-VITAMIN DAILY PO), Take 1 tablet by mouth Daily., Disp: , Rfl:     Multiple Vitamins-Minerals (OCUVITE ADULT FORMULA PO), Take 1 tablet by mouth 2 (Two) Times a Day., Disp: , Rfl:     vitamin B-12 (CYANOCOBALAMIN) 1000 MCG tablet, Take 2 tablets by mouth Daily., Disp: , Rfl:     Vitamin D, Cholecalciferol, 1000 units capsule, Take 2 capsules by mouth Daily., Disp: , Rfl:     vitamin E 400 UNIT capsule, Take 1 capsule by mouth Daily., Disp: , Rfl:     warfarin (COUMADIN) 5 MG tablet, Take 1 tablet by mouth Daily., Disp: , Rfl:     Facility-Administered Medications Ordered in Other Encounters:     Chlorhexidine Gluconate Cloth 2 % pads 1 Application, 1 Application, Topical, Q12H PRN, Liz Almaraz PA-C    ALLERGIES     Patient has no known allergies.    FAMILY HISTORY       Family History   Problem Relation Age of Onset    Hypertension Mother     Sick sinus syndrome Mother     Heart failure Mother     Atrial fibrillation Father     Hyperlipidemia Sister     Hypertension Sister     Hyperlipidemia Sister     Hypertension Sister      Hyperlipidemia Sister     Hypertension Sister     Hyperlipidemia Sister     Hypertension Sister     Valvular heart disease Brother           SOCIAL HISTORY       Social History     Socioeconomic History    Marital status:     Number of children: 4   Tobacco Use    Smoking status: Never    Smokeless tobacco: Never   Vaping Use    Vaping status: Never Used   Substance and Sexual Activity    Alcohol use: No    Drug use: No    Sexual activity: Defer         PHYSICAL EXAM    (up to 7 for level 4, 8 or more for level 5)     Vitals:    06/13/25 0530 06/13/25 0600 06/13/25 0630 06/13/25 0700   BP: 148/65 144/73 124/61 127/65   BP Location:       Patient Position:       Pulse: 96 85 81 85   Resp:       Temp:       TempSrc:       SpO2: 91% 91% 91% 91%   Weight:       Height:           Physical Exam  Constitutional:       General: She is not in acute distress.  HENT:      Head: Normocephalic and atraumatic.   Eyes:      Conjunctiva/sclera: Conjunctivae normal.      Pupils: Pupils are equal, round, and reactive to light.   Cardiovascular:      Rate and Rhythm: Normal rate and regular rhythm.      Pulses: Normal pulses.      Heart sounds: No murmur heard.     No gallop.   Pulmonary:      Effort: Pulmonary effort is normal. No respiratory distress.   Abdominal:      General: Abdomen is flat. There is no distension.      Tenderness: There is no abdominal tenderness.   Musculoskeletal:         General: No swelling or deformity. Normal range of motion.      Comments: Brace around the torso   Skin:     General: Skin is warm and dry.      Capillary Refill: Capillary refill takes less than 2 seconds.   Neurological:      Mental Status: She is alert.      Comments: GCS 15.  Cranial Nerves II-XII intact without deficit.  Strength 5/5 in the bilateral upper extremities.  Strength 5/5 in the bilateral lower extremities.  Sensation to light touch intact throughout.    Psychiatric:         Mood and Affect: Mood normal.          Behavior: Behavior normal.            DIAGNOSTIC RESULTS     EKG: All EKGs are interpreted by the Emergency Department Physician who either signs or Co-signs this chart in the absence of a cardiologist.    ECG 12 Lead Altered Mental Status   Final Result   Test Reason : Altered Mental Status   Blood Pressure :   */*   mmHG   Vent. Rate :  93 BPM     Atrial Rate :  93 BPM      P-R Int : 194 ms          QRS Dur : 118 ms       QT Int : 366 ms       P-R-T Axes :  64 -56  89 degrees     QTcB Int : 455 ms      Normal sinus rhythm   Left axis deviation   Right bundle branch block   Left ventricular hypertrophy with repolarization abnormality   Possible Lateral infarct , age undetermined   Abnormal ECG   When compared with ECG of 28-Mar-2025 03:01,   Right bundle branch block is now present   Criteria for Inferior infarct are no longer present   Confirmed by MD ZIGGY, GINO ([id number]) on 6/13/2025 3:52:41 AM      Referred By: ED MD           Confirmed By: GINO TRINH MD            RADIOLOGY:   [x] Radiologist's Report Reviewed:  CT Head Without Contrast   Final Result   Impression:   1.No acute intracranial abnormality.   2.Mild chronic small vessel ischemic change.                  Electronically Signed: Deo Harrell MD     6/13/2025 5:08 AM EDT     Workstation ID: [id number]          I ordered and independently reviewed the above noted radiographic studies.        LABS:  I independently interpreted all laboratory studies conducted during this ED visit.  The results of these studies can be seen below and my independent interpretation in the ED course      EMERGENCY DEPARTMENT COURSE and DIFFERENTIAL DIAGNOSIS/MDM:   Vitals:  AS OF 07:18 EDT    BP - 127/65  HR - 85  TEMP - 98.6 °F (37 °C) (Oral)  O2 SATS - 91%        Discussion below represents my analysis of pertinent findings related to patient's condition, differential diagnosis, treatment plan and final disposition.      Differential diagnosis:  The differential  diagnosis associated with the patient's presentation includes: Intracranial bleeding, sepsis, urinary tract infection, metabolic derangement, delirium, polypharmacy      Independent interpretations (ECG/rhythm strip/X-ray/US/CT scan): See ED course      Additional sources:  Discussed/obtained information from independent historians:   [] Spouse:   [] Parent:   [] Friend:   [] EMS:   [x] Other: Family member provides majority of HPI    External record review:  5/6/2025 reviewed most recent progress note from cardiology, patient has nonrheumatic aortic stenosis status post TAVR, peripheral vascular disease, carotid artery stenosis, dizziness and hypertension      Patient's care impacted by:   [] Diabetes   [] Hypertension   [] Coronary Artery Disease   [] Cancer   [x] Other: Valvular heart disease    Care significantly affected by Social Determinants of Health (housing and economic circumstances, unemployment)    [] Yes     [x] No   If yes, Patient's care significantly limited by  Social Determinants of Health including:    [] Inadequate housing    [] Low income    [] Alcoholism and drug addiction in family    [] Problems related to primary support group    [] Unemployment    [] Problems related to employment    [] Other Social Determinants of Health:     I considered prescription management with:    [] Pain medication:   [] Antiviral:   [] Antibiotic:   [] Other:    Additional orders considered but not ordered:  The following testing was considered but ultimately not selected:     ED Course:    ED Course as of 06/13/25 0718   Fri Jun 13, 2025   8537 Twelve-lead ECG independently interpreted by myself demonstrates normal sinus rhythm with a rate of 93, there appears to be an incomplete left bundle branch block with a QRS duration of 118, there is widespread ST segment changes with elevations in 3, aVF, V3 V4 V5 V6.  There are ST segment depressions in aVL and lead I. [KB]   2341 CT scan of the head independently  interpreted by myself demonstrates no acute intracranial abnormality [KB]   0745 Laboratory workup independently interpreted by myself demonstrates mildly elevated liver enzymes, mild anemia, elevated high-sensitivity troponin with stable delta, mild hyponatremia and hypochloremia [KB]      ED Course User Index  [KB] Grover Brown MD         Broad diagnostic studies were conducted.    Patient was reevaluated on multiple occasions through her ER course.  On each occasion she has normal mental state, answers all questions appropriately.  After pain medication her back is feeling improved.  IV fluids were given.    Ultimately definitive cause for her episode of transient altered mental state which I am calling delirium is not identified in the emergency department.  She has normal vital signs in the ER after she had initially presented with hypotension it trends down to normal with no interventions.  On my final reevaluation patient states she is feeling tired but otherwise has no symptoms, she answers all my questions including orientation questions and discussion with her family members appropriately.    Prior to discharge from the emergency department I discussed with the patient and any family members present the current diagnosis, treatment plan, recommendations regarding follow-up with a primary care doctor or specialist, general emergency room return precautions as well as return precautions specific to their diagnosis and treatment.  The patient/family indicated understanding of these instructions.  Time was allotted to answer questions at that time and throughout the ED course.         PROCEDURES:  Procedures    CRITICAL CARE TIME        CONSULTS       FINAL IMPRESSION      1. Delirium    2. Episode of hypertension          DISPOSITION/PLAN     ED Disposition       ED Disposition   Discharge    Condition   Stable    Comment   --                 Comment: Please note this report has been produced using speech  recognition software.      Grover Brown MD  Attending Emergency Physician    Recent Results (from the past 24 hours)   ECG 12 Lead Altered Mental Status    Collection Time: 06/13/25  3:16 AM   Result Value Ref Range    QT Interval 366 ms    QTC Interval 455 ms   Comprehensive Metabolic Panel    Collection Time: 06/13/25  4:16 AM    Specimen: Blood   Result Value Ref Range    Glucose 115 (H) 65 - 99 mg/dL    BUN 12.2 8.0 - 23.0 mg/dL    Creatinine 0.60 0.57 - 1.00 mg/dL    Sodium 133 (L) 136 - 145 mmol/L    Potassium 4.0 3.5 - 5.2 mmol/L    Chloride 96 (L) 98 - 107 mmol/L    CO2 26.0 22.0 - 29.0 mmol/L    Calcium 9.4 8.6 - 10.5 mg/dL    Total Protein 6.2 6.0 - 8.5 g/dL    Albumin 3.6 3.5 - 5.2 g/dL    ALT (SGPT) 35 (H) 1 - 33 U/L    AST (SGOT) 39 (H) 1 - 32 U/L    Alkaline Phosphatase 103 39 - 117 U/L    Total Bilirubin 0.5 0.0 - 1.2 mg/dL    Globulin 2.6 gm/dL    A/G Ratio 1.4 g/dL    BUN/Creatinine Ratio 20.3 7.0 - 25.0    Anion Gap 11.0 5.0 - 15.0 mmol/L    eGFR 85.9 >60.0 mL/min/1.73   Protime-INR    Collection Time: 06/13/25  4:16 AM    Specimen: Blood   Result Value Ref Range    Protime 20.8 (H) 12.2 - 15.3 Seconds    INR 1.67 (H) 0.89 - 1.12   Lipase    Collection Time: 06/13/25  4:16 AM    Specimen: Blood   Result Value Ref Range    Lipase 46 13 - 60 U/L   High Sensitivity Troponin T    Collection Time: 06/13/25  4:16 AM    Specimen: Blood   Result Value Ref Range    HS Troponin T 20 (H) <14 ng/L   Lactic Acid, Plasma    Collection Time: 06/13/25  4:16 AM    Specimen: Blood   Result Value Ref Range    Lactate 1.1 0.5 - 2.0 mmol/L   CBC Auto Differential    Collection Time: 06/13/25  4:16 AM    Specimen: Blood   Result Value Ref Range    WBC 8.41 3.40 - 10.80 10*3/mm3    RBC 3.81 3.77 - 5.28 10*6/mm3    Hemoglobin 11.6 (L) 12.0 - 15.9 g/dL    Hematocrit 34.7 34.0 - 46.6 %    MCV 91.1 79.0 - 97.0 fL    MCH 30.4 26.6 - 33.0 pg    MCHC 33.4 31.5 - 35.7 g/dL    RDW 14.8 12.3 - 15.4 %    RDW-SD 49.8 37.0 - 54.0 fl     MPV 9.6 6.0 - 12.0 fL    Platelets 129 (L) 140 - 450 10*3/mm3    Neutrophil % 51.9 42.7 - 76.0 %    Lymphocyte % 44.5 19.6 - 45.3 %    Monocyte % 2.5 (L) 5.0 - 12.0 %    Eosinophil % 0.6 0.3 - 6.2 %    Basophil % 0.4 0.0 - 1.5 %    Immature Grans % 0.1 0.0 - 0.5 %    Neutrophils, Absolute 4.37 1.70 - 7.00 10*3/mm3    Lymphocytes, Absolute 3.74 (H) 0.70 - 3.10 10*3/mm3    Monocytes, Absolute 0.21 0.10 - 0.90 10*3/mm3    Eosinophils, Absolute 0.05 0.00 - 0.40 10*3/mm3    Basophils, Absolute 0.03 0.00 - 0.20 10*3/mm3    Immature Grans, Absolute 0.01 0.00 - 0.05 10*3/mm3    nRBC 0.0 0.0 - 0.2 /100 WBC   High Sensitivity Troponin T 1Hr    Collection Time: 06/13/25  5:22 AM    Specimen: Blood   Result Value Ref Range    HS Troponin T 18 (H) <14 ng/L    Troponin T Numeric Delta -2 ng/L    Troponin T % Delta -10 Abnormal if >/= 20%   Urinalysis With Microscopic If Indicated (No Culture) - Urine, Clean Catch    Collection Time: 06/13/25  5:53 AM    Specimen: Urine, Clean Catch   Result Value Ref Range    Color, UA Yellow Yellow, Straw    Appearance, UA Clear Clear    pH, UA 7.0 5.0 - 8.0    Specific Gravity, UA 1.012 1.001 - 1.030    Glucose, UA Negative Negative    Ketones, UA Negative Negative    Bilirubin, UA Negative Negative    Blood, UA Negative Negative    Protein, UA Negative Negative    Leuk Esterase, UA Negative Negative    Nitrite, UA Negative Negative    Urobilinogen, UA 0.2 E.U./dL 0.2 - 1.0 E.U./dL     Note: In addition to lab results from this visit, the labs listed above may include labs taken at another facility or during a different encounter within the last 24 hours. Please correlate lab times with ED admission and discharge times for further clarification of the services performed during this visit.                 Grover Brown MD  06/13/25 0718       Grover Brown MD  06/13/25 0718

## 2025-06-13 NOTE — DISCHARGE INSTRUCTIONS
Definitive cause for patient's temporary episode of confusion was not identified in the emergency room today.  There were some very mild abnormalities on blood work including a low sodium level, mildly elevated liver enzymes, and subtherapeutic INR.  The small abnormalities are unlikely to be the cause for her symptoms.  Call to schedule a close follow-up appointment with her primary care doctor.  Return to the ER as needed for any new or worsening symptoms.  Continue taking the antibiotics prescribed for treatment of her urinary tract infection

## 2025-06-13 NOTE — TELEPHONE ENCOUNTER
Called to verify inpatient meds given in ED upon AVS review. All questions answered.     Reason for Disposition   [1] Condition / symptoms BETTER (improving) AND [2] caller has additional questions triager can answer    Additional Information   Negative: Sounds like a life-threatening emergency to the triager   Negative: Discharged in past month from the hospital with a diagnosis of chronic obstructive pulmonic disease (COPD)   Negative: Discharged in past month from the hospital with a diagnosis of congestive heart failure (CHF) or heart failure (HF)   Negative: Discharged in past month from the hospital with a diagnosis of heart attack (myocardial infarction)   Negative: Discharged in past month from the hospital with a diagnosis of pneumonia   Negative: Taking antibiotic for cellulitis (follow-up call)   Negative: Taking antibiotic for other infection (follow-up call)   Negative: [1] Post-op AND [2] incision symptoms or questions   Negative: [1] Post-op AND [2] general symptoms or questions   Negative: Pain, redness, swelling, or pus at IV Site   Negative: IV not running or running slowly   Negative: Symptoms arising from use of a urinary catheter (e.g., Coude, Rose)   Negative: Medication question   Negative: New-onset fever   Negative: [1] New symptom AND [2] not a possible complication of hospitalized condition   Negative: Patient sounds very sick or weak to the triager   Negative: Sounds like a serious complication to the triager   Negative: Condition / symptoms WORSE   Negative: [1] Caller has URGENT question AND [2] triager unable to answer question   Negative: [1] Discharged from hospital within this past week AND [2] taking Coumadin (warfarin) AND [3] no INR testing performed within 5 days of discharge   Negative: Condition / symptoms SAME (not improving)   Negative: [1] Caller has NON-URGENT question AND [2] triager unable to answer question    Answer Assessment - Initial Assessment Questions  1. MAIN  "CONCERN OR SYMPTOM:  \"What is your main concern right now?\" \"What question do you have?\" \"What's the main symptom you're worried about?\" (e.g., breathing difficulty, ankle swelling, weight gain.)  Called to verify inpatient meds given in ED upon AVS review. All questions answered.    Protocols used: Post-Hospitalization Follow-up Call-ADULT-    "

## 2025-06-23 ENCOUNTER — TELEPHONE (OUTPATIENT)
Dept: CARDIOLOGY | Facility: CLINIC | Age: 89
End: 2025-06-23
Payer: MEDICARE

## 2025-06-23 NOTE — TELEPHONE ENCOUNTER
Received VM from pt daughter, Jason Mo Friday afternoon. Left VM this morning for her to return my call. In her message she asked about holter monitor results and states on 6/12 at 9:50 pm pt /83, then less than an hour later 211/101. EMS was called and taken to  ER. States pt had chills, slurred speech, confusion with UTI and taking antibiotics. Left  stating Dr. Green will be in [facility] tomorrow and I can get her an appointment. Waiting on call back.

## 2025-06-24 ENCOUNTER — OFFICE VISIT (OUTPATIENT)
Dept: CARDIOLOGY | Facility: CLINIC | Age: 89
End: 2025-06-24
Payer: MEDICARE

## 2025-06-24 VITALS
OXYGEN SATURATION: 95 % | HEIGHT: 64 IN | HEART RATE: 77 BPM | BODY MASS INDEX: 23.66 KG/M2 | SYSTOLIC BLOOD PRESSURE: 168 MMHG | WEIGHT: 138.6 LBS | DIASTOLIC BLOOD PRESSURE: 70 MMHG

## 2025-06-24 DIAGNOSIS — I10 ESSENTIAL HYPERTENSION: ICD-10-CM

## 2025-06-24 DIAGNOSIS — I73.9 PERIPHERAL VASCULAR DISEASE: ICD-10-CM

## 2025-06-24 DIAGNOSIS — I35.0 NONRHEUMATIC AORTIC VALVE STENOSIS: Primary | ICD-10-CM

## 2025-06-24 DIAGNOSIS — I65.21 CAROTID ARTERY STENOSIS, SYMPTOMATIC, RIGHT: ICD-10-CM

## 2025-06-24 DIAGNOSIS — E78.5 DYSLIPIDEMIA: ICD-10-CM

## 2025-06-24 PROCEDURE — 99214 OFFICE O/P EST MOD 30 MIN: CPT | Performed by: INTERNAL MEDICINE

## 2025-06-24 PROCEDURE — 1160F RVW MEDS BY RX/DR IN RCRD: CPT | Performed by: INTERNAL MEDICINE

## 2025-06-24 PROCEDURE — 1159F MED LIST DOCD IN RCRD: CPT | Performed by: INTERNAL MEDICINE

## 2025-06-24 RX ORDER — CARVEDILOL 25 MG/1
25 TABLET ORAL 2 TIMES DAILY
Qty: 180 TABLET | Refills: 3 | Status: SHIPPED | OUTPATIENT
Start: 2025-06-24

## 2025-06-24 RX ORDER — DOXYCYCLINE 100 MG/1
TABLET ORAL
COMMUNITY
End: 2025-06-24

## 2025-06-24 NOTE — PROGRESS NOTES
[facility] Cardiology    Encounter Date: 2025    Patient ID: Patricia Redmond is a 89 y.o. female.  : 1935     PCP: Emmett Martines MD       Chief Complaint: Nonrheumatic aortic valve stenosis, Dizziness, Edema (Bilateral ankles), and Hypertension      PROBLEM LIST:  Peripheral Artery Disease:  Claudication.  CTA lower extremities with contrast, 2018:   Right: Right SFA is occluded. Popliteal is reconstituted by collaterals and then occluded distally. Peroneal trunk is occluded. Anterior and posterior tibial are occluded and then reconstituted by collaterals.  Left: Left SFA is occluded. Popliteal is occluded. Common peroneal trunk and anterior tibial artery reconstituted by collaterals.  VENUS, 2021: Left 0.78 and right 0.84.  VENUS, 2024: Right VENUS moderately reduced. Left VENUS normal.   Aortic valve stenosis:  Echo, 2018: EF 56-60%. Left atrial cavity size is mildly dilated. LV diastolic dysfunction (grade I). Moderate AS, mean gradient 21 mmHg. Mild MR/TR. RVSP within normal limits.  Echo, 2019: EF 65%. Mild concentric LVH. Impaired relaxation. Moderate AS, CARLTON 1.2 cm2, mean gradient 26 mmHg. Mild MR/TR, normal RVSP.  Echo, 2020: EF 60%  Moderate AS, mean gradient 20 mmHg. Mild MR/TR.  Echo, 10/15/2021: EF 65%. Moderate AS, mean gradient 26.8 and CARLTON 1.1 cm². Moderate MR. Mild TR with normal RVSP. Mild LVH.   Holter, 10/15/2021: Sinus rhythm. Occasional PACs, rare PVCs, and no pauses. Occasional short SVT/PAT. No symptoms.   Echocardiogram 2022: EF 66-70%, moderate aortic stenosis with CARLTON 1.25 cm², mean gradient 29 mmHg, max gradient 52 mmHg  Echo, 2023: EF 65%. Moderate aortic valve stenosis is present.  Mean gradient 34 mmHg, CARLTON 1.25 cm². Mild MR/TR with elevated RVSP.  Echo, 2024: EF 51-55%. LV wall thickness consistent with mild concentric hypertrophy. LA volume is moderately increased, Severe AV stenosis with mean  gradient 47 mmHg. Mild aortic insufficiency. Mild MR and TR with normal RVSP.   LHC, 12/23/2024: No evidence of hemodynamic significant coronary artery disease.   DORIS, 12/23/2024: EF 60%. Biatrial enlargement. Severe AS. Peak velocity 414 cm/s, mean gradient 40 mmHg. Aortic annulus 2.4 cm, STJ 2.9 cm. Mild MR. Trace TR. Mobile echogenic structure is noted in the left ventricular outflow track which was also seen in post TAVR DORIS. This is most likely consistent with a chordal structure or an intimal flap and does not appear hemodynamically significant.   S/p TAVR, 03/27/2025: There is a 25 mm TAVR valve present.   Echo, 03/28/2025: EF>70%. LV wall thickness is consistent with mild concentric hypertrophy. The left atrial cavity is mild to moderately dilated. Mean gradient 9 mmHg, there is trace aortic insufficiency. Mild MR.   Echo, 04/28/2025: EF 60%. MCH.  Mean gradient 9 mmHg, CARLTON 2.4 cm². Mild aortic regurgitation is noted. Previously noted mobile echogenic structure in the LV OT is again visualized and appears less mobile. This was felt to be related to intimal flap and is not physiologically significant. Mild MR/TR/WV.   Carotid artery stenosis  Carotid duplex, 02/04/2025: Right internal carotid artery demonstrates a 50-69% stenosis. Left internal carotid artery demonstrates a less than 50% stenosis.  Dizziness:   24h Holter, 03/28/2019: Symptoms of SOA occured with sinus rhythm. Short runs of SVT 4-6 beats.  3d Holter, 06/03/2025: Sinus rhythm, average rate 79/min. Rare PACs, rare PVCs. 6 beat episode of irregular WCT that appears consistent with PAT with aberrancy. Occasional PAT/SVT lasting up to 13.9 seconds. No significant symptom correlation.  Hypertension.  Venous duplex, 05/03/2023: Chronic right lower extremity deep vein thrombosis noted in the gastrocnemius. Chronic right lower extremity superficial thrombophlebitis noted in the small saphenous. Chronic left lower extremity deep vein thrombosis noted  in the gastrocnemius. Sub-acute left lower extremity deep vein thrombosis noted in the distal femoral. There was deep venous valvular incompetence noted in the right popliteal.  Dyslipidemia.  DVT x 2 (circa 2002).  On chronic warfarin therapy.  TIA.  Macular degeneration.  Pseudoxanthoma elasticum.  Hypothyroidism.  Osteoporosis.    History of Present Illness  Patient presents today for a follow-up with a history of aortic stenosis, status post TAVR, peripheral vascular disease, carotid artery stenosis, and cardiac risk factors. Since last visit, patient states that she is not feeling as well as she did at last visit.  She reports that after a accidental fall at home she was hospitalized with compression fracture.  Conservative management was recommended however she was told that kyphoplasty can be considered if her pain does not improve.  The back pain has however continued to improve.  Previously she was participating in cardiac rehab and had gained significant strength but now is back to feeling poorly.  She currently also has a urinary tract infection and is on antibiotics.  Ports feeling fatigued and tired with lack of energy and lack of will to do much.   The daughter has been monitoring her blood pressure several times a day and is concerned about high readings as high as 190.  During 1 of these checkups patient was not responding well and was taken to [facility] ER where she had negative workup for stroke.  Denying chest pain or significant dyspnea.  No palpitations.  Continues to experience dizziness.  During hospitalization she was noted to have orthostatic blood pressure drop of about 20 points    No Known Allergies      Current Outpatient Medications:     acetaminophen (TYLENOL) 325 MG tablet, Take 2 tablets by mouth Every 6 (Six) Hours As Needed for Mild Pain., Disp: 90 tablet, Rfl: 0    atorvastatin (LIPITOR) 20 MG tablet, Take 1 tablet by mouth Every Night., Disp: , Rfl: 3    calcium  carbonate-vitamin d 600-400 MG-UNIT per tablet, Take 1 tablet by mouth 2 (Two) Times a Day., Disp: , Rfl:     carvedilol (COREG) 25 MG tablet, Take 1 tablet by mouth 2 (Two) Times a Day. (Patient taking differently: Take 0.5 tablets by mouth Daily. 1/2 tab at NOON, AND 1 whole tab at QHS), Disp: 180 tablet, Rfl: 3    carvedilol (COREG) 25 MG tablet, Take 1 tablet by mouth Every Night., Disp: , Rfl:     cephalexin (KEFLEX) 500 MG capsule, Take 1 capsule by mouth 3 times a day., Disp: , Rfl:     cilostazol (PLETAL) 50 MG tablet, Take 1 tablet by mouth 2 (Two) Times a Day., Disp: , Rfl:     estradiol (ESTRACE) 0.1 MG/GM vaginal cream, Insert 2 g into the vagina 3 (Three) Times a Week., Disp: , Rfl:     ezetimibe (ZETIA) 10 MG tablet, Take 1 tablet by mouth Daily., Disp: , Rfl: 2    ferrous sulfate 325 (65 FE) MG tablet, Take 1 tablet by mouth Daily. 1 tablet Daily, Disp: , Rfl:     lansoprazole (PREVACID) 30 MG capsule, Take 1 capsule by mouth Every Evening., Disp: , Rfl:     levothyroxine (SYNTHROID, LEVOTHROID) 75 MCG tablet, Take 1 tablet by mouth Take As Directed. 75mcg daily except ON SUNDAY, TAKES 1.5 TABS, Disp: , Rfl: 3    losartan-hydrochlorothiazide (HYZAAR) 50-12.5 MG per tablet, Take 1 tablet by mouth Daily., Disp: , Rfl:     Multiple Vitamin (MULTI-VITAMIN DAILY PO), Take 1 tablet by mouth Daily., Disp: , Rfl:     Multiple Vitamins-Minerals (OCUVITE ADULT FORMULA PO), Take 1 tablet by mouth 2 (Two) Times a Day., Disp: , Rfl:     vitamin B-12 (CYANOCOBALAMIN) 1000 MCG tablet, Take 2 tablets by mouth Daily., Disp: , Rfl:     Vitamin D, Cholecalciferol, 1000 units capsule, Take 2 capsules by mouth Daily., Disp: , Rfl:     vitamin E 400 UNIT capsule, Take 1 capsule by mouth Daily., Disp: , Rfl:     warfarin (COUMADIN) 5 MG tablet, Take 1 tablet by mouth Daily., Disp: , Rfl:   No current facility-administered medications for this visit.    Facility-Administered Medications Ordered in Other Visits:      "Chlorhexidine Gluconate Cloth 2 % pads 1 Application, 1 Application, Topical, Q12H PRN, Liz Almaraz PA-C    The following portions of the patient's history were reviewed and updated as appropriate: allergies, current medications, past family history, past medical history, past social history, past surgical history and problem list.    ROS  Review of Systems   14 point ROS negative except for that listed in the HPI.         Objective:     /76 (BP Location: Right arm, Patient Position: Sitting)   Pulse 77   Ht 162.6 cm (64\")   Wt 62.9 kg (138 lb 9.6 oz)   SpO2 95%   BMI 23.79 kg/m²      Physical Exam  General: No apparent distress.  Neck: no JVD.  Chest:No respiratory distress, breath sounds are normal. No wheezes,  rhonchi or rales.  Cardiovascular: Normal S1 and S2, no murmur, gallop or rub.    Extremities: No edema.      Data Review:   Lab Results   Component Value Date    GLUCOSE 115 (H) 06/13/2025    BUN 12.2 06/13/2025    CREATININE 0.60 06/13/2025    EGFR 85.9 06/13/2025    BCR 20.3 06/13/2025     (L) 06/13/2025    K 4.0 06/13/2025    CO2 26.0 06/13/2025    CALCIUM 9.4 06/13/2025    ALBUMIN 3.6 06/13/2025    AST 39 (H) 06/13/2025    ALT 35 (H) 06/13/2025     Lab Results   Component Value Date    CHOL 119 12/23/2024    TRIG 46 12/23/2024    HDL 66 (H) 12/23/2024    LDL 42 12/23/2024      Lab Results   Component Value Date    WBC 8.41 06/13/2025    RBC 3.81 06/13/2025    HGB 11.6 (L) 06/13/2025    HCT 34.7 06/13/2025    MCV 91.1 06/13/2025     (L) 06/13/2025     Lab Results   Component Value Date    HGBA1C 5.20 03/26/2025        Procedures     Advance Care Planning   ACP discussion was held with the patient during this visit. Patient does not have an advance directive, declines further assistance.           Assessment:      Diagnosis Plan   1. Nonrheumatic aortic valve stenosis s/p TAVR  Stable and asymptomatic.       2. Peripheral vascular disease  Stable and asymptomatic.     "   3. Carotid artery stenosis, symptomatic, right  Stable and asymptomatic.       4. Essential hypertension wide fluctuations with high readings Increase carvedilol from 12.5 mg to 25 mg BID for better control of hypertension. Continue routinely monitoring blood pressure at home however check it only once in the morning and once at bedtime with a goal for average systolic to be <160 mmHg. Continue on losartan-hydrochlorothiazide 50-12.5 mg daily for hypertension.  If blood pressure remains elevated we will further increase the dose of this.      5. Dyslipidemia  Well controlled. LDL 42 on 12/23/2024. Continue on atorvastatin 20 mg nightly and Zetia 10 mg daily for hyperlipidemia.         Plan:   Stable cardiac status. No current typical angina or CHF symptoms.   Increase carvedilol from 12.5 mg to 25 mg BID for better control of hypertension.   Blood pressure twice daily once in the morning and once at bedtime with goal of every systolic to be <160 mmHg.  Prostatic symptoms we will need some permissive hypertension.  Continue rest of the current medications.  In case of persistent blood pressure elevation we will consider increasing the dose of losartan.  Regular activity and physical therapy and encouraged.  FU in 3 MO, sooner as needed.  Thank you for allowing us to participate in the care of your patient.     Scribed for Carolin Green MD by Zainab Michael. 6/24/2025  13:29 EDT    I, Carolin Green MD, personally performed the services described in this documentation as scribed by the above named individual in my presence, and it is both accurate and complete.  6/24/2025  20:44 EDT      Please note that portions of this note may have been completed with a voice recognition program. Efforts were made to edit the dictations, but occasionally words are mistranscribed.

## 2025-07-24 ENCOUNTER — TELEPHONE (OUTPATIENT)
Dept: CARDIOLOGY | Facility: CLINIC | Age: 89
End: 2025-07-24
Payer: MEDICARE

## 2025-07-24 DIAGNOSIS — I73.9 PERIPHERAL VASCULAR DISEASE: Primary | ICD-10-CM

## 2025-07-24 NOTE — TELEPHONE ENCOUNTER
Received call from pt daughter, Jason Mo, stating pt has a dental appt in early August and does she need to take antibiotics beforehand? Messaged with Dr. Green via secure chat who says she should. Spoke with Jason who says pt does have a card from her TAVR and advised it should have instructions on what to prescribe and when. She was unsure if pt having cleaning only or actual treatment. Encouraged to call me back if needs anything further.

## 2025-07-31 ENCOUNTER — HOSPITAL ENCOUNTER (EMERGENCY)
Facility: HOSPITAL | Age: 89
Discharge: HOME | End: 2025-07-31
Attending: EMERGENCY MEDICINE
Payer: MEDICARE

## 2025-07-31 ENCOUNTER — APPOINTMENT (OUTPATIENT)
Dept: CT IMAGING | Facility: HOSPITAL | Age: 89
End: 2025-07-31
Payer: MEDICARE

## 2025-07-31 VITALS
HEART RATE: 85 BPM | HEIGHT: 64 IN | DIASTOLIC BLOOD PRESSURE: 91 MMHG | BODY MASS INDEX: 23.05 KG/M2 | WEIGHT: 135 LBS | TEMPERATURE: 98 F | SYSTOLIC BLOOD PRESSURE: 139 MMHG | RESPIRATION RATE: 18 BRPM | OXYGEN SATURATION: 94 %

## 2025-07-31 DIAGNOSIS — R42 LIGHTHEADEDNESS: Primary | ICD-10-CM

## 2025-07-31 DIAGNOSIS — Z79.01 ANTICOAGULATED ON COUMADIN: ICD-10-CM

## 2025-07-31 DIAGNOSIS — R29.6 UNWITNESSED FALL: ICD-10-CM

## 2025-07-31 DIAGNOSIS — Z86.79 HISTORY OF HYPERTENSION: ICD-10-CM

## 2025-07-31 DIAGNOSIS — S60.032A CONTUSION OF LEFT MIDDLE FINGER WITHOUT DAMAGE TO NAIL, INITIAL ENCOUNTER: ICD-10-CM

## 2025-07-31 DIAGNOSIS — E87.1 HYPONATREMIA: ICD-10-CM

## 2025-07-31 LAB
ALBUMIN SERPL-MCNC: 3.5 G/DL (ref 3.5–5.2)
ALBUMIN/GLOB SERPL: 1.3 G/DL
ALP SERPL-CCNC: 90 U/L (ref 39–117)
ALT SERPL W P-5'-P-CCNC: 20 U/L (ref 1–33)
ANION GAP SERPL CALCULATED.3IONS-SCNC: 8 MMOL/L (ref 5–15)
AST SERPL-CCNC: 28 U/L (ref 1–32)
BASOPHILS # BLD AUTO: 0.02 10*3/MM3 (ref 0–0.2)
BASOPHILS NFR BLD AUTO: 0.3 % (ref 0–1.5)
BILIRUB SERPL-MCNC: 0.5 MG/DL (ref 0–1.2)
BILIRUB UR QL STRIP: NEGATIVE
BUN SERPL-MCNC: 9.5 MG/DL (ref 8–23)
BUN/CREAT SERPL: 16.7 (ref 7–25)
CALCIUM SPEC-SCNC: 9.1 MG/DL (ref 8.6–10.5)
CHLORIDE SERPL-SCNC: 94 MMOL/L (ref 98–107)
CLARITY UR: CLEAR
CO2 SERPL-SCNC: 27 MMOL/L (ref 22–29)
COLOR UR: YELLOW
CREAT SERPL-MCNC: 0.57 MG/DL (ref 0.57–1)
DEPRECATED RDW RBC AUTO: 49.8 FL (ref 37–54)
EGFRCR SERPLBLD CKD-EPI 2021: 87 ML/MIN/1.73
EOSINOPHIL # BLD AUTO: 0.28 10*3/MM3 (ref 0–0.4)
EOSINOPHIL NFR BLD AUTO: 3.7 % (ref 0.3–6.2)
ERYTHROCYTE [DISTWIDTH] IN BLOOD BY AUTOMATED COUNT: 15 % (ref 12.3–15.4)
GEN 5 1HR TROPONIN T REFLEX: 16 NG/L
GLOBULIN UR ELPH-MCNC: 2.7 GM/DL
GLUCOSE SERPL-MCNC: 94 MG/DL (ref 65–99)
GLUCOSE UR STRIP-MCNC: NEGATIVE MG/DL
HCT VFR BLD AUTO: 37.6 % (ref 34–46.6)
HGB BLD-MCNC: 12.3 G/DL (ref 12–15.9)
HGB UR QL STRIP.AUTO: NEGATIVE
IMM GRANULOCYTES # BLD AUTO: 0.01 10*3/MM3 (ref 0–0.05)
IMM GRANULOCYTES NFR BLD AUTO: 0.1 % (ref 0–0.5)
INR PPP: 2.42 (ref 0.89–1.12)
KETONES UR QL STRIP: NEGATIVE
LEUKOCYTE ESTERASE UR QL STRIP.AUTO: NEGATIVE
LYMPHOCYTES # BLD AUTO: 3.84 10*3/MM3 (ref 0.7–3.1)
LYMPHOCYTES NFR BLD AUTO: 50.1 % (ref 19.6–45.3)
MCH RBC QN AUTO: 29.9 PG (ref 26.6–33)
MCHC RBC AUTO-ENTMCNC: 32.7 G/DL (ref 31.5–35.7)
MCV RBC AUTO: 91.3 FL (ref 79–97)
MONOCYTES # BLD AUTO: 0.43 10*3/MM3 (ref 0.1–0.9)
MONOCYTES NFR BLD AUTO: 5.6 % (ref 5–12)
NEUTROPHILS NFR BLD AUTO: 3.08 10*3/MM3 (ref 1.7–7)
NEUTROPHILS NFR BLD AUTO: 40.2 % (ref 42.7–76)
NITRITE UR QL STRIP: NEGATIVE
NRBC BLD AUTO-RTO: 0 /100 WBC (ref 0–0.2)
PH UR STRIP.AUTO: 7 [PH] (ref 5–8)
PLATELET # BLD AUTO: 119 10*3/MM3 (ref 140–450)
PMV BLD AUTO: 9 FL (ref 6–12)
POTASSIUM SERPL-SCNC: 4.4 MMOL/L (ref 3.5–5.2)
PROT SERPL-MCNC: 6.2 G/DL (ref 6–8.5)
PROT UR QL STRIP: NEGATIVE
PROTHROMBIN TIME: 28 SECONDS (ref 12.2–15.3)
RBC # BLD AUTO: 4.12 10*6/MM3 (ref 3.77–5.28)
SODIUM SERPL-SCNC: 129 MMOL/L (ref 136–145)
SP GR UR STRIP: 1.01 (ref 1–1.03)
TROPONIN T % DELTA: -6
TROPONIN T NUMERIC DELTA: -1 NG/L
TROPONIN T SERPL HS-MCNC: 17 NG/L
UROBILINOGEN UR QL STRIP: NORMAL
WBC NRBC COR # BLD AUTO: 7.66 10*3/MM3 (ref 3.4–10.8)

## 2025-07-31 PROCEDURE — 81003 URINALYSIS AUTO W/O SCOPE: CPT | Performed by: EMERGENCY MEDICINE

## 2025-07-31 PROCEDURE — 93005 ELECTROCARDIOGRAM TRACING: CPT | Performed by: EMERGENCY MEDICINE

## 2025-07-31 PROCEDURE — 84484 ASSAY OF TROPONIN QUANT: CPT | Performed by: EMERGENCY MEDICINE

## 2025-07-31 PROCEDURE — 80053 COMPREHEN METABOLIC PANEL: CPT | Performed by: EMERGENCY MEDICINE

## 2025-07-31 PROCEDURE — 85025 COMPLETE CBC W/AUTO DIFF WBC: CPT | Performed by: EMERGENCY MEDICINE

## 2025-07-31 PROCEDURE — 85610 PROTHROMBIN TIME: CPT | Performed by: EMERGENCY MEDICINE

## 2025-07-31 PROCEDURE — 99284 EMERGENCY DEPT VISIT MOD MDM: CPT

## 2025-07-31 PROCEDURE — 36415 COLL VENOUS BLD VENIPUNCTURE: CPT

## 2025-07-31 PROCEDURE — 70450 CT HEAD/BRAIN W/O DYE: CPT

## 2025-07-31 PROCEDURE — 72125 CT NECK SPINE W/O DYE: CPT

## 2025-07-31 NOTE — ED PROVIDER NOTES
[place]    EMERGENCY DEPARTMENT ENCOUNTER      Pt Name: Patricia Redmond  MRN: [id number]  YOB: 1935  Date of evaluation: 7/31/2025  Provider: Sha Hope MD    CHIEF COMPLAINT       Chief Complaint   Patient presents with    Headache    Fall         HISTORY OF PRESENT ILLNESS   Patricia Redmond is a 89 y.o. female who presents to the emergency department with complaint of a fall that occurred earlier today. Patient says that she was sitting on the commode and when she got up she became very lightheaded and fell forward. She is unsure if she actually lost consciousness and is also unsure if she hit her head. She complains of some mild HA but denies any neck pain, peripheral paresthesias, weakness, numbness, or other area of injury. She says she still feels a little lightheaded. She denies any associated chest pain, shortness of breath, or palpitiatons.      Nursing notes were reviewed.    REVIEW OF SYSTEMS     ROS:  A chief complaint appropriate review of systems was completed and is negative except as noted in the HPI.      PAST MEDICAL HISTORY     Past Medical History:   Diagnosis Date    Anesthesia complication     slow to awaken    Aortic valve stenosis     Deep vein thrombosis     warfarin    Degenerative disc disease, lumbar     Dyslipidemia     GERD (gastroesophageal reflux disease)     Hypertension     Hypothyroidism     Inguinal hernia     Macular degeneration     Osteoporosis     Peripheral vascular disease     PONV (postoperative nausea and vomiting)     Pseudoxanthoma elasticum     TIA (transient ischemic attack)          SURGICAL HISTORY       Past Surgical History:   Procedure Laterality Date    AORTIC VALVE REPAIR/REPLACEMENT N/A 3/27/2025    Procedure: TRANSCATHETER AORTIC VALVE REPLACEMENT / DORIS;  Surgeon: Boni Tao MD;  Location: [facility];  Service: Cardiothoracic;  Laterality: N/A;  FL-17 MIN 36SEC  DOSE-327 MGY   CONTRAST-45ML ISO    AORTIC VALVE  REPAIR/REPLACEMENT N/A 3/27/2025    Procedure: Transfemoral Transcatheter Aortic Valve Replacement;  Surgeon: Carolin Green MD;  Location: [facility] HYBRID OR;  Service: Cardiovascular;  Laterality: N/A;    CARDIAC CATHETERIZATION N/A 12/23/2024    Procedure: Left Heart Cath - Left radial access;  Surgeon: Carolin Green MD;  Location:  [facility] INVASIVE [facility]ATION;  Service: Cardiovascular;  Laterality: N/A;    HAND TENDON SURGERY Right     HYSTERECTOMY      INGUINAL HERNIA REPAIR Left     INNER EAR SURGERY Right     TONSILLECTOMY           CURRENT MEDICATIONS     No current facility-administered medications for this encounter.    Current Outpatient Medications:     acetaminophen (TYLENOL) 325 MG tablet, Take 2 tablets by mouth Every 6 (Six) Hours As Needed for Mild Pain., Disp: 90 tablet, Rfl: 0    atorvastatin (LIPITOR) 20 MG tablet, Take 1 tablet by mouth Every Night., Disp: , Rfl: 3    calcium carbonate-vitamin d 600-400 MG-UNIT per tablet, Take 1 tablet by mouth 2 (Two) Times a Day., Disp: , Rfl:     carvedilol (COREG) 25 MG tablet, Take 1 tablet by mouth 2 (Two) Times a Day., Disp: 180 tablet, Rfl: 3    cephalexin (KEFLEX) 500 MG capsule, Take 1 capsule by mouth 3 times a day., Disp: , Rfl:     cilostazol (PLETAL) 50 MG tablet, Take 1 tablet by mouth 2 (Two) Times a Day., Disp: , Rfl:     estradiol (ESTRACE) 0.1 MG/GM vaginal cream, Insert 2 g into the vagina 3 (Three) Times a Week., Disp: , Rfl:     ezetimibe (ZETIA) 10 MG tablet, Take 1 tablet by mouth Daily., Disp: , Rfl: 2    ferrous sulfate 325 (65 FE) MG tablet, Take 1 tablet by mouth Daily. 1 tablet Daily, Disp: , Rfl:     lansoprazole (PREVACID) 30 MG capsule, Take 1 capsule by mouth Every Evening., Disp: , Rfl:     levothyroxine (SYNTHROID, LEVOTHROID) 75 MCG tablet, Take 1 tablet by mouth Take As Directed. 75mcg daily except ON SUNDAY, TAKES 1.5 TABS, Disp: , Rfl: 3    losartan-hydrochlorothiazide (HYZAAR) 50-12.5 MG per tablet, Take 1 tablet by mouth  "Daily., Disp: , Rfl:     Multiple Vitamin (MULTI-VITAMIN DAILY PO), Take 1 tablet by mouth Daily., Disp: , Rfl:     Multiple Vitamins-Minerals (OCUVITE ADULT FORMULA PO), Take 1 tablet by mouth 2 (Two) Times a Day., Disp: , Rfl:     vitamin B-12 (CYANOCOBALAMIN) 1000 MCG tablet, Take 2 tablets by mouth Daily., Disp: , Rfl:     Vitamin D, Cholecalciferol, 1000 units capsule, Take 2 capsules by mouth Daily., Disp: , Rfl:     vitamin E 400 UNIT capsule, Take 1 capsule by mouth Daily., Disp: , Rfl:     warfarin (COUMADIN) 5 MG tablet, Take 1 tablet by mouth Daily., Disp: , Rfl:     Facility-Administered Medications Ordered in Other Encounters:     Chlorhexidine Gluconate Cloth 2 % pads 1 Application, 1 Application, Topical, Q12H PRN, Liz Almaraz PA-C    ALLERGIES     Patient has no known allergies.    FAMILY HISTORY       Family History   Problem Relation Age of Onset    Hypertension Mother     Sick sinus syndrome Mother     Heart failure Mother     Atrial fibrillation Father     Hyperlipidemia Sister     Hypertension Sister     Hyperlipidemia Sister     Hypertension Sister     Hyperlipidemia Sister     Hypertension Sister     Hyperlipidemia Sister     Hypertension Sister     Valvular heart disease Brother           SOCIAL HISTORY       Social History     Socioeconomic History    Marital status:     Number of children: 4   Tobacco Use    Smoking status: Never    Smokeless tobacco: Never   Vaping Use    Vaping status: Never Used   Substance and Sexual Activity    Alcohol use: No    Drug use: No    Sexual activity: Defer         PHYSICAL EXAM    (up to 7 for level 4, 8 or more for level 5)     Vitals:    07/31/25 1602   BP: 139/91   BP Location: Right arm   Patient Position: Sitting   Pulse: 85   Resp: 18   Temp: 98 °F (36.7 °C)   TempSrc: Oral   SpO2: 94%   Weight: 61.2 kg (135 lb)   Height: 162.6 cm (64\")       General: Awake, alert, no acute distress.  HEENT: Conjunctivae normal.  Neck: Trachea " midline.  Cardiac: Heart regular rate, rhythm, no murmurs, rubs, or gallops  Lungs: Lungs are clear to auscultation, there is no wheezing, rhonchi, or rales. There is no use of accessory muscles.  Abdomen: Abdomen is soft, nontender, nondistended. There are no firm or pulsatile masses, no rebound rigidity or guarding.   Musculoskeletal: No deformity.  Neuro: Alert   Dermatology: Skin is warm and dry  Psych: Mentation is grossly normal, cognition is grossly normal. Affect is appropriate.        DIAGNOSTIC RESULTS     EKG: All EKGs are interpreted by the Emergency Department Physician who either signs or Co-signs this chart in the absence of a cardiologist.    ECG 12 Lead Other; lightheadedness   Final Result   Test Reason : Other~   Blood Pressure :   */*   mmHG   Vent. Rate :  68 BPM     Atrial Rate :  68 BPM      P-R Int : 160 ms          QRS Dur : 118 ms       QT Int : 444 ms       P-R-T Axes :  43 -53  55 degrees     QTcB Int : 472 ms      Normal sinus rhythm   Left anterior fascicular block   Left ventricular hypertrophy with QRS widening and repolarization   abnormality   Abnormal ECG   When compared with ECG of 13-Jun-2025 03:16,   Right bundle branch block is no longer present   Confirmed by CHITO HENSON ([id number]) on 8/2/2025 1:45:51 AM      Referred By: ED MD           Confirmed By: CHITO HENSON            RADIOLOGY:   [x] Radiologist's Report Reviewed:  CT Head Without Contrast   Final Result   Impression:   1.No acute intracranial abnormality.   2.Diffuse brain atrophy and chronic small vessel ischemia.   3.No acute fracture or malalignment of the cervical spine.            Electronically Signed: Jeffry Riojas MD     7/31/2025 5:00 PM EDT     Workstation ID: [id number]      CT Cervical Spine Without Contrast   Final Result   Impression:   1.No acute intracranial abnormality.   2.Diffuse brain atrophy and chronic small vessel ischemia.   3.No acute fracture or malalignment of the cervical spine.             Electronically Signed: Jeffry Riojas MD     7/31/2025 5:00 PM EDT     Workstation ID: [id number]          I ordered and independently reviewed the above noted radiographic studies.        LABS:    I have reviewed and interpreted all of the currently available lab results from this visit (if applicable):  Results for orders placed or performed during the hospital encounter of 07/31/25   Urinalysis With Microscopic If Indicated (No Culture) - Urine, Clean Catch    Collection Time: 07/31/25  4:47 PM    Specimen: Urine, Clean Catch   Result Value Ref Range    Color, UA Yellow Yellow, Straw    Appearance, UA Clear Clear    pH, UA 7.0 5.0 - 8.0    Specific Gravity, UA 1.007 1.005 - 1.030    Glucose, UA Negative Negative    Ketones, UA Negative Negative    Bilirubin, UA Negative Negative    Blood, UA Negative Negative    Protein, UA Negative Negative    Leuk Esterase, UA Negative Negative    Nitrite, UA Negative Negative    Urobilinogen, UA 0.2 E.U./dL 0.2 - 1.0 E.U./dL   Comprehensive Metabolic Panel    Collection Time: 07/31/25  6:08 PM    Specimen: Blood   Result Value Ref Range    Glucose 94 65 - 99 mg/dL    BUN 9.5 8.0 - 23.0 mg/dL    Creatinine 0.57 0.57 - 1.00 mg/dL    Sodium 129 (L) 136 - 145 mmol/L    Potassium 4.4 3.5 - 5.2 mmol/L    Chloride 94 (L) 98 - 107 mmol/L    CO2 27.0 22.0 - 29.0 mmol/L    Calcium 9.1 8.6 - 10.5 mg/dL    Total Protein 6.2 6.0 - 8.5 g/dL    Albumin 3.5 3.5 - 5.2 g/dL    ALT (SGPT) 20 1 - 33 U/L    AST (SGOT) 28 1 - 32 U/L    Alkaline Phosphatase 90 39 - 117 U/L    Total Bilirubin 0.5 0.0 - 1.2 mg/dL    Globulin 2.7 gm/dL    A/G Ratio 1.3 g/dL    BUN/Creatinine Ratio 16.7 7.0 - 25.0    Anion Gap 8.0 5.0 - 15.0 mmol/L    eGFR 87.0 >60.0 mL/min/1.73   High Sensitivity Troponin T    Collection Time: 07/31/25  6:08 PM    Specimen: Blood   Result Value Ref Range    HS Troponin T 17 (H) <14 ng/L   Protime-INR    Collection Time: 07/31/25  6:08 PM    Specimen: Blood   Result Value Ref Range     Protime 28.0 (H) 12.2 - 15.3 Seconds    INR 2.42 (H) 0.89 - 1.12   CBC Auto Differential    Collection Time: 07/31/25  6:08 PM    Specimen: Blood   Result Value Ref Range    WBC 7.66 3.40 - 10.80 10*3/mm3    RBC 4.12 3.77 - 5.28 10*6/mm3    Hemoglobin 12.3 12.0 - 15.9 g/dL    Hematocrit 37.6 34.0 - 46.6 %    MCV 91.3 79.0 - 97.0 fL    MCH 29.9 26.6 - 33.0 pg    MCHC 32.7 31.5 - 35.7 g/dL    RDW 15.0 12.3 - 15.4 %    RDW-SD 49.8 37.0 - 54.0 fl    MPV 9.0 6.0 - 12.0 fL    Platelets 119 (L) 140 - 450 10*3/mm3    Neutrophil % 40.2 (L) 42.7 - 76.0 %    Lymphocyte % 50.1 (H) 19.6 - 45.3 %    Monocyte % 5.6 5.0 - 12.0 %    Eosinophil % 3.7 0.3 - 6.2 %    Basophil % 0.3 0.0 - 1.5 %    Immature Grans % 0.1 0.0 - 0.5 %    Neutrophils, Absolute 3.08 1.70 - 7.00 10*3/mm3    Lymphocytes, Absolute 3.84 (H) 0.70 - 3.10 10*3/mm3    Monocytes, Absolute 0.43 0.10 - 0.90 10*3/mm3    Eosinophils, Absolute 0.28 0.00 - 0.40 10*3/mm3    Basophils, Absolute 0.02 0.00 - 0.20 10*3/mm3    Immature Grans, Absolute 0.01 0.00 - 0.05 10*3/mm3    nRBC 0.0 0.0 - 0.2 /100 WBC   ECG 12 Lead Other; lightheadedness    Collection Time: 07/31/25  6:44 PM   Result Value Ref Range    QT Interval 444 ms    QTC Interval 472 ms   High Sensitivity Troponin T 1Hr    Collection Time: 07/31/25  7:33 PM    Specimen: Blood   Result Value Ref Range    HS Troponin T 16 (H) <14 ng/L    Troponin T Numeric Delta -1 ng/L    Troponin T % Delta -6 Abnormal if >/= 20%        If labs were ordered, I independently reviewed the results and considered them in treating the patient.      EMERGENCY DEPARTMENT COURSE and DIFFERENTIAL DIAGNOSIS/MDM:   Vitals:  AS OF 14:55 EDT    BP - 139/91  HR - 85  TEMP - 98 °F (36.7 °C) (Oral)  O2 SATS - 94%        Discussion below represents my analysis of pertinent findings related to patient's condition, differential diagnosis, treatment plan and final disposition.      Differential diagnosis:  The differential diagnosis associated with the  patient's presentation includes: ACS, dysrhythmia, head injury, C spine injury, vasovagal episode, orthostatic hypotension, anemia      Independent interpretations (ECG/rhythm strip/X-ray/US/CT scan): I independently interpreted the pt head CT and C spine CT - there is no ICH and there is no C spine fx      Patient's care impacted by:   [] Diabetes   [] Hypertension   [] Coronary Artery Disease   [] Cancer   [x] Other: anticoagulated on coumadin    Care significantly affected by Social Determinants of Health (housing and economic circumstances, unemployment)    [] Yes     [x] No   If yes, Patient's care significantly limited by  Social Determinants of Health including:    [] Inadequate housing    [] Low income    [] Alcoholism and drug addiction in family    [] Problems related to primary support group    [] Unemployment    [] Problems related to employment    [] Other Social Determinants of Health:       Consideration of admission/observation vs discharge: I  considered admission, however patient had resolution of her lightheadedness while in the ED, workup was unremarkable, felt this was likely to be orthostatic in nature given history. As such, felt that she was safe for discharge home.        ED Course:    ED Course as of 08/02/25 1455   Thu Jul 31, 2025   1966 On reexamination, patient remains well-appearing and nontoxic.  She is at her cognitive baseline and is ambulatory at her baseline on her rollator.  Patient gives a strong history for orthostatic hypotension/lightheadedness.  She had no chest pain, shortness of breath, palpitations, or other symptoms to suggest cardiopulmonary emergency.  ECG unremarkable and comparable to previous tests.  Troponins flat.  Laboratory evaluation shows no significant anemia, electrolyte derangement, or evidence of dehydration.  Patient has mild hyponatremia with a sodium of 129.  She has stable chronic thrombocytopenia.  On reexamination, she was complaining about some pain  to her left middle finger which she thinks may have happened during her fall earlier today.  She does have some bruising there but no gross deformity or focal bone tenderness.  I offered an x-ray of this area to rule out fracture, the patient declines at this time.  Patient daughter also agrees with this decision and says that they will follow-up with the PCP and have an x-ray performed if she continues to have pain there.  In regards to her fall and unknown head injury, head CT is negative.  She has no neck pain or tenderness on examination, CT C-spine is negative, she has no concerning neurologic symptoms or deficits on physical exam to suggest occult injury. [NS]      ED Course User Index  [NS] Sha Hope MD           I had a discussion with the patient/family regarding diagnosis, diagnostic results, treatment plan, and medications.  The patient/family indicated understanding of these instructions.  I spent adequate time at the bedside preceding discharge necessary to personally discuss the aftercare instructions, giving patient education, providing explanations of the results of our evaluations/findings, and my decision making to assure that the patient/family understand the plan of care.  Time was allotted to answer questions at that time and throughout the ED course.  Emphasis was placed on timely follow-up after discharge.  I also discussed the potential for the development of an acute emergent condition requiring further evaluation, admission, or even surgical intervention. I discussed that we found nothing during the visit today indicating the need for further workup, admission, or the presence of an unstable medical condition.  I encouraged the patient to return to the emergency department immediately for ANY concerns, worsening, new complaints, or if symptoms persist and unable to seek follow-up in a timely fashion.  The patient/family expressed understanding and agreement with this plan.  The  patient will follow-up with their PCP in 1-2 days for reevaluation.       FINAL IMPRESSION      1. Lightheadedness    2. Hyponatremia    3. Unwitnessed fall    4. Anticoagulated on Coumadin    5. Contusion of left middle finger without damage to nail, initial encounter    6. History of hypertension          DISPOSITION/PLAN     ED Disposition       ED Disposition   Discharge    Condition   Stable    Comment   --                 Comment: Please note this report has been produced using speech recognition software.      Sha Hope MD  Attending Emergency Physician             Sha Hope MD  08/02/25 [id number]

## 2025-08-02 LAB
QT INTERVAL: 444 MS
QTC INTERVAL: 472 MS

## 2025-08-06 ENCOUNTER — TELEPHONE (OUTPATIENT)
Dept: CARDIOLOGY | Facility: CLINIC | Age: 89
End: 2025-08-06
Payer: MEDICARE

## (undated) DEVICE — SUCTION CANISTER 2500CC: Brand: DEROYAL

## (undated) DEVICE — PK CATH CARD 10

## (undated) DEVICE — INTRO SHEATH DRYSEAL FLEX 14F 4.7TO5.3MM 33CM

## (undated) DEVICE — SI AVANTI+ 7F STD W/GW  NO OBT: Brand: AVANTI

## (undated) DEVICE — PENCL SMOKE/EVAC MEGADYNE TELESCP 10FT

## (undated) DEVICE — PK PERFUS CUST W/CARDIOPLEGIA

## (undated) DEVICE — ANGIOGRAPHIC CATHETER: Brand: EXPO™

## (undated) DEVICE — ST ACC MICROPUNCTURE .018 TRANSLSS/SS/TP 5F/10CM 21G/7CM

## (undated) DEVICE — SUT SILK 0 SH 30IN K834H

## (undated) DEVICE — 3M™ STERI-DRAPE™ FLUOROSCOPE DRAPE, 10 PER CARTON / 4 CARTONS PER CASE, 1012: Brand: STERI-DRAPE™

## (undated) DEVICE — GLV SURG BIOGEL LTX PF 7 1/2

## (undated) DEVICE — CABL PACE ATRIAL PT BLU

## (undated) DEVICE — GOWN,SIRUS,NONRNF,SETINSLV,XL,20/CS: Brand: MEDLINE

## (undated) DEVICE — PATIENT RETURN ELECTRODE, SINGLE-USE, CONTACT QUALITY MONITORING, ADULT, WITH 9FT CORD, FOR PATIENTS WEIGING OVER 33LBS. (15KG): Brand: MEGADYNE

## (undated) DEVICE — DECANTER BAG 9": Brand: MEDLINE INDUSTRIES, INC.

## (undated) DEVICE — SYR LUERLOK 30CC

## (undated) DEVICE — RADIFOCUS GLIDEWIRE ADVANTAGE GUIDEWIRE: Brand: GLIDEWIRE ADVANTAGE

## (undated) DEVICE — SLV REPOSTNG CATH STRL 60CM

## (undated) DEVICE — RADIFOCUS GLIDEWIRE: Brand: GLIDEWIRE

## (undated) DEVICE — APPL CHLORAPREP TINTED 26ML TEAL

## (undated) DEVICE — PERCLOSE™ PROSTYLE™ SUTURE-MEDIATED CLOSURE AND REPAIR SYSTEM: Brand: PERCLOSE™ PROSTYLE™

## (undated) DEVICE — CATH DIAG EXPO .045 FL3.5 5F 100CM

## (undated) DEVICE — GW INQWIRE FC PTFE STD J/1.5 .035 260

## (undated) DEVICE — PINNACLE INTRODUCER SHEATH: Brand: PINNACLE

## (undated) DEVICE — GW SAFARI2 PRESH XSM CRV .035IN 3.2X2.9X275CM

## (undated) DEVICE — PK ATS CUST W CARDIOTOMY RESEVOIR

## (undated) DEVICE — DRSNG SURESITE WNDW 4X4.5

## (undated) DEVICE — MODEL AT P65, P/N 701554-001KIT CONTENTS: HAND CONTROLLER, 3-WAY HIGH-PRESSURE STOPCOCK WITH ROTATING END AND PREMIUM HIGH-PRESSURE TUBING: Brand: ANGIOTOUCH® KIT

## (undated) DEVICE — GLV SURG BIOGEL LTX PF 8

## (undated) DEVICE — GW CERBRL FC PTFE STD/STR .035 260CM

## (undated) DEVICE — HNDL BLAD BARD/PARKER POLY REUS

## (undated) DEVICE — ADULT, W/LG. BACK PAD, RADIOTRANSPARENT ELEMENT AND LEAD WIRE COMPATIBLE W/: Brand: DEFIBRILLATION ELECTRODES

## (undated) DEVICE — CATH PACE PACEL BIPOL 5F110CM

## (undated) DEVICE — LEX TAVR: Brand: MEDLINE INDUSTRIES, INC.

## (undated) DEVICE — TR BAND RADIAL ARTERY COMPRESSION DEVICE: Brand: TR BAND

## (undated) DEVICE — GW PERIPH VASC ADX J/TP SS .035 150CM 3MM

## (undated) DEVICE — INTRO SHEATH PRELUDE IDEAL SPRNG COIL 021 6F 23X80CM

## (undated) DEVICE — COVER,TABLE,HVY DUTY,60"X90",STRL: Brand: MEDLINE

## (undated) DEVICE — ELECTRD NDL EZ CLN STD 2.75IN

## (undated) DEVICE — BOWL UTIL STRL 32OZ

## (undated) DEVICE — LHK- LEFT HEART KIT BAPTIST: Brand: MEDLINE INDUSTRIES, INC.

## (undated) DEVICE — HDRST POSTIN FM CRDL TRACH SLOT NONCOMRESS 9X8X4IN

## (undated) DEVICE — SENSR CERBRL O2 PK/2

## (undated) DEVICE — TRAP FLD MINIVAC MEGADYNE 100ML

## (undated) DEVICE — ST EXT IV SMRTSTE 2VLV FIX M LL 6ML 41

## (undated) DEVICE — ELECTRD DEFIB M/FUNC PROPADZ STRL 2PK

## (undated) DEVICE — SYR LUERLOK 50ML

## (undated) DEVICE — BLANKT WARM UNDER/BDY A/ 100X195CM DISP LF

## (undated) DEVICE — STERILE PVP: Brand: MEDLINE INDUSTRIES, INC.

## (undated) DEVICE — MICRO HVTSA, 0.5G AND HVTSA SOURCEMARK PRODUCT CODE M1206 AND M1206-01: Brand: EXOFIN MICRO HVTSA, 0.5G

## (undated) DEVICE — NDL PERC 1PRT THNWALL W/BASEPLT 18G 7CM

## (undated) DEVICE — PROVIDES A STERILE INTERFACE BETWEEN THE OPERATING ROOM SURGICAL LAMPS (NON-STERILE) AND THE SURGEON OR NURSE (STERILE).: Brand: STERION®CLAMP COVER FABRIC

## (undated) DEVICE — LN INJ CONTRST FLXCIL HP F/M LL 1200PSI48

## (undated) DEVICE — CATH DIAG EXPO M/ PK 5F FL4/FR4 PIG

## (undated) DEVICE — CATH DIAG EXPO .056 AL1 6F 100CM

## (undated) DEVICE — MODEL BT2000 P/N 700287-012KIT CONTENTS: MANIFOLD WITH SALINE AND CONTRAST PORTS, SALINE TUBING WITH SPIKE AND HAND SYRINGE, TRANSDUCER: Brand: BT2000 AUTOMATED MANIFOLD KIT